# Patient Record
Sex: FEMALE | Race: WHITE | NOT HISPANIC OR LATINO | Employment: FULL TIME | URBAN - METROPOLITAN AREA
[De-identification: names, ages, dates, MRNs, and addresses within clinical notes are randomized per-mention and may not be internally consistent; named-entity substitution may affect disease eponyms.]

---

## 2017-01-27 ENCOUNTER — OFFICE VISIT (OUTPATIENT)
Dept: OBSTETRICS AND GYNECOLOGY | Facility: CLINIC | Age: 32
End: 2017-01-27
Payer: COMMERCIAL

## 2017-01-27 VITALS
DIASTOLIC BLOOD PRESSURE: 76 MMHG | BODY MASS INDEX: 41.52 KG/M2 | SYSTOLIC BLOOD PRESSURE: 128 MMHG | HEIGHT: 66 IN | WEIGHT: 258.38 LBS

## 2017-01-27 DIAGNOSIS — N91.2 AMENORRHEA: Primary | ICD-10-CM

## 2017-01-27 PROCEDURE — 99213 OFFICE O/P EST LOW 20 MIN: CPT | Mod: S$GLB,,, | Performed by: OBSTETRICS & GYNECOLOGY

## 2017-01-27 PROCEDURE — 99999 PR PBB SHADOW E&M-EST. PATIENT-LVL II: CPT | Mod: PBBFAC,,, | Performed by: OBSTETRICS & GYNECOLOGY

## 2017-01-27 RX ORDER — ONDANSETRON 4 MG/1
4 TABLET, FILM COATED ORAL DAILY PRN
Qty: 30 TABLET | Refills: 1 | Status: SHIPPED | OUTPATIENT
Start: 2017-01-27 | End: 2017-04-21

## 2017-01-27 NOTE — PROGRESS NOTES
"31 y.o.   OB History     No data available        Comlaining of:  Amenorrhea, lm-p nov 16, lorri aug 23  Prev c secton, had gest dm, elevated bp the last visit  No other co    PMH neg      ROS:  GENERAL: No fever, chills, fatigability or weight loss.  SKIN: No rashes, itching or changes in color or texture of skin.  HEAD: No headaches or recent head trauma.  EYES: Visual acuity fine. No photophobia, ocular pain or diplopia.  EARS: Denies ear pain, discharge or vertigo.  NOSE: No loss of smell, no epistaxis or postnasal drip.  MOUTH & THROAT: No hoarseness or change in voice. No excessive gum bleeding.  NODES: Denies swollen glands.  CHEST: Denies SWEET, cyanosis, wheezing, cough and sputum production.  CARDIOVASCULAR: Denies chest pain, PND, orthopnea or reduced exercise tolerance.  ABDOMEN: Appetite fine. No weight loss. Denies diarrhea, abdominal pain, hematemesis or blood in stool.  URINARY: No flank pain, dysuria or hematuria.  PERIPHERAL VASCULAR: No claudication or cyanosis.  MUSCULOSKELETAL: No joint stiffness or swelling. Denies back pain.  NEUROLOGIC: No history of seizures, paralysis, alteration of gait or coordination      PE:   Visit Vitals    /76    Ht 5' 6" (1.676 m)    Wt 117.2 kg (258 lb 6.1 oz)    LMP 11/16/2016 (Exact Date)    BMI 41.7 kg/m2        Hed/neck normal  abd soft  Viable on us    Discussed wt , sugar, etc  Labs next visit  Us 2 weeks, me in 3-4    "

## 2017-02-16 ENCOUNTER — OFFICE VISIT (OUTPATIENT)
Dept: OBSTETRICS AND GYNECOLOGY | Facility: CLINIC | Age: 32
End: 2017-02-16
Payer: COMMERCIAL

## 2017-02-16 DIAGNOSIS — N91.2 AMENORRHEA: Primary | ICD-10-CM

## 2017-02-16 DIAGNOSIS — N91.2 AMENORRHEA: ICD-10-CM

## 2017-02-16 DIAGNOSIS — Z36.89 ENCOUNTER TO ESTABLISH GESTATIONAL AGE USING ULTRASOUND: Primary | ICD-10-CM

## 2017-02-16 PROCEDURE — 76801 OB US < 14 WKS SINGLE FETUS: CPT | Mod: S$GLB,,, | Performed by: OBSTETRICS & GYNECOLOGY

## 2017-02-16 PROCEDURE — 0502F SUBSEQUENT PRENATAL CARE: CPT | Mod: S$GLB,,, | Performed by: OBSTETRICS & GYNECOLOGY

## 2017-03-30 ENCOUNTER — OFFICE VISIT (OUTPATIENT)
Dept: OBSTETRICS AND GYNECOLOGY | Facility: CLINIC | Age: 32
End: 2017-03-30
Payer: COMMERCIAL

## 2017-03-30 ENCOUNTER — LAB VISIT (OUTPATIENT)
Dept: LAB | Facility: HOSPITAL | Age: 32
End: 2017-03-30
Attending: OBSTETRICS & GYNECOLOGY
Payer: COMMERCIAL

## 2017-03-30 VITALS
WEIGHT: 259.69 LBS | DIASTOLIC BLOOD PRESSURE: 68 MMHG | SYSTOLIC BLOOD PRESSURE: 124 MMHG | BODY MASS INDEX: 41.92 KG/M2

## 2017-03-30 DIAGNOSIS — Z36.3 ENCOUNTER FOR ROUTINE SCREENING FOR MALFORMATION USING ULTRASONICS: ICD-10-CM

## 2017-03-30 DIAGNOSIS — N91.2 AMENORRHEA: ICD-10-CM

## 2017-03-30 DIAGNOSIS — N76.0 VAGINOSIS: Primary | ICD-10-CM

## 2017-03-30 LAB
ABO + RH BLD: NORMAL
ALBUMIN SERPL BCP-MCNC: 2.9 G/DL
ALP SERPL-CCNC: 66 U/L
ALT SERPL W/O P-5'-P-CCNC: 14 U/L
ANION GAP SERPL CALC-SCNC: 10 MMOL/L
AST SERPL-CCNC: 10 U/L
BASOPHILS # BLD AUTO: 0.01 K/UL
BASOPHILS NFR BLD: 0.1 %
BILIRUB SERPL-MCNC: 0.2 MG/DL
BLD GP AB SCN CELLS X3 SERPL QL: NORMAL
BUN SERPL-MCNC: 10 MG/DL
CALCIUM SERPL-MCNC: 9 MG/DL
CHLORIDE SERPL-SCNC: 108 MMOL/L
CO2 SERPL-SCNC: 17 MMOL/L
CREAT SERPL-MCNC: 0.8 MG/DL
DIFFERENTIAL METHOD: ABNORMAL
EOSINOPHIL # BLD AUTO: 0.1 K/UL
EOSINOPHIL NFR BLD: 0.9 %
ERYTHROCYTE [DISTWIDTH] IN BLOOD BY AUTOMATED COUNT: 13 %
EST. GFR  (AFRICAN AMERICAN): >60 ML/MIN/1.73 M^2
EST. GFR  (NON AFRICAN AMERICAN): >60 ML/MIN/1.73 M^2
GLUCOSE SERPL-MCNC: 186 MG/DL
HCT VFR BLD AUTO: 34.1 %
HGB BLD-MCNC: 11.7 G/DL
LYMPHOCYTES # BLD AUTO: 1.6 K/UL
LYMPHOCYTES NFR BLD: 17.5 %
MCH RBC QN AUTO: 30.4 PG
MCHC RBC AUTO-ENTMCNC: 34.3 %
MCV RBC AUTO: 89 FL
MONOCYTES # BLD AUTO: 0.3 K/UL
MONOCYTES NFR BLD: 3.2 %
NEUTROPHILS # BLD AUTO: 7.3 K/UL
NEUTROPHILS NFR BLD: 78.1 %
PLATELET # BLD AUTO: 373 K/UL
PMV BLD AUTO: 9.3 FL
POTASSIUM SERPL-SCNC: 3.4 MMOL/L
PROT SERPL-MCNC: 7.1 G/DL
RBC # BLD AUTO: 3.85 M/UL
SODIUM SERPL-SCNC: 135 MMOL/L
WBC # BLD AUTO: 9.36 K/UL

## 2017-03-30 PROCEDURE — 81220 CFTR GENE COM VARIANTS: CPT

## 2017-03-30 PROCEDURE — 87340 HEPATITIS B SURFACE AG IA: CPT

## 2017-03-30 PROCEDURE — 81511 FTL CGEN ABNOR FOUR ANAL: CPT

## 2017-03-30 PROCEDURE — 80053 COMPREHEN METABOLIC PANEL: CPT

## 2017-03-30 PROCEDURE — 85025 COMPLETE CBC W/AUTO DIFF WBC: CPT

## 2017-03-30 PROCEDURE — 86592 SYPHILIS TEST NON-TREP QUAL: CPT

## 2017-03-30 PROCEDURE — 86850 RBC ANTIBODY SCREEN: CPT

## 2017-03-30 PROCEDURE — 87480 CANDIDA DNA DIR PROBE: CPT

## 2017-03-30 PROCEDURE — 36415 COLL VENOUS BLD VENIPUNCTURE: CPT

## 2017-03-30 PROCEDURE — 86900 BLOOD TYPING SEROLOGIC ABO: CPT

## 2017-03-30 PROCEDURE — 86762 RUBELLA ANTIBODY: CPT

## 2017-03-30 PROCEDURE — 86703 HIV-1/HIV-2 1 RESULT ANTBDY: CPT

## 2017-03-30 PROCEDURE — 99999 PR PBB SHADOW E&M-EST. PATIENT-LVL II: CPT | Mod: PBBFAC,,, | Performed by: OBSTETRICS & GYNECOLOGY

## 2017-03-30 PROCEDURE — 0502F SUBSEQUENT PRENATAL CARE: CPT | Mod: S$GLB,,, | Performed by: OBSTETRICS & GYNECOLOGY

## 2017-03-31 LAB
HBV SURFACE AG SERPL QL IA: NEGATIVE
HIV 1+2 AB+HIV1 P24 AG SERPL QL IA: NEGATIVE
RPR SER QL: NORMAL
RUBV IGG SER-ACNC: 29.6 IU/ML
RUBV IGG SER-IMP: REACTIVE

## 2017-04-01 LAB
CANDIDA RRNA VAG QL PROBE: NEGATIVE
G VAGINALIS RRNA GENITAL QL PROBE: NEGATIVE
T VAGINALIS RRNA GENITAL QL PROBE: NEGATIVE

## 2017-04-03 LAB
ALPHA FETOPROTEIN MATERNAL: 26.5 NG/ML
CFTR MUT ANL BLD/T: NORMAL
DOWN RISK (<1:270): NORMAL
ETHNIC ORIGIN: NORMAL
GA METHOD: NORMAL
GESTATIONAL AGE (DAYS): 1
GESTATIONAL AGE (WEEKS): 15
HUMAN CHORIONIC GONADOTROPIN: 6.4 IU/ML
INHIBIN A: 115.5 PG/ML
INSULIN DEPEND. DIABETES: NORMAL
M.O.M. ALPHA FETOPROTEIN: 1.37
M.O.M. HCG: 0.18
M.O.M. INHIBIN A: 0.87
M.O.M. UNCONJ. ESTRIOL: 1.58
MATERNAL AGE AT EDD (YRS): 32
MATERNAL AGE FOR DOWN: NORMAL
MATERNAL WEIGHT (LBS): 260
MULTIPLE GESTATIONS: NORMAL
QUAD SCREEN INTERPRETATION: NORMAL
QUAD SCREEN: NEGATIVE
TRISOMY 18 (<1:100): NORMAL
UNCONJUGATED ESTRIOL: 0.85 NG/ML

## 2017-04-12 ENCOUNTER — PROCEDURE VISIT (OUTPATIENT)
Dept: OBSTETRICS AND GYNECOLOGY | Facility: CLINIC | Age: 32
End: 2017-04-12
Payer: COMMERCIAL

## 2017-04-12 DIAGNOSIS — Z36.3 ENCOUNTER FOR ROUTINE SCREENING FOR MALFORMATION USING ULTRASONICS: ICD-10-CM

## 2017-04-12 PROCEDURE — 76805 OB US >/= 14 WKS SNGL FETUS: CPT | Mod: S$GLB,,, | Performed by: OBSTETRICS & GYNECOLOGY

## 2017-04-13 ENCOUNTER — TELEPHONE (OUTPATIENT)
Dept: OBSTETRICS AND GYNECOLOGY | Facility: CLINIC | Age: 32
End: 2017-04-13

## 2017-04-13 NOTE — TELEPHONE ENCOUNTER
Informed patient of results. Patient voiced understanding. Notified patient to call office if there were any further questions.   Patient is reporting recently cutting her left leg while shaving and since then from her calf down has been swollen, red, and hot to the touch and her last BP was 138/68. Patient is negative for fever, blurry vision, or headaches. Verbalized understanding. Patient advised to seek treatment at an urgent care. Patient verbalized understanding.

## 2017-04-13 NOTE — TELEPHONE ENCOUNTER
----- Message from Michael A. Wiedemann, MD sent at 4/13/2017 12:15 PM CDT -----  Yay, her ob 20 week us yesterday was fine,  Make appt for ob fu in 4 weeks, thanks

## 2017-04-21 ENCOUNTER — ROUTINE PRENATAL (OUTPATIENT)
Dept: OBSTETRICS AND GYNECOLOGY | Facility: CLINIC | Age: 32
End: 2017-04-21
Payer: COMMERCIAL

## 2017-04-21 VITALS
BODY MASS INDEX: 43.02 KG/M2 | DIASTOLIC BLOOD PRESSURE: 70 MMHG | SYSTOLIC BLOOD PRESSURE: 116 MMHG | WEIGHT: 266.56 LBS

## 2017-04-21 DIAGNOSIS — N91.2 AMENORRHEA: Primary | ICD-10-CM

## 2017-04-21 PROCEDURE — 0502F SUBSEQUENT PRENATAL CARE: CPT | Mod: S$GLB,,, | Performed by: OBSTETRICS & GYNECOLOGY

## 2017-04-21 PROCEDURE — 99999 PR PBB SHADOW E&M-EST. PATIENT-LVL II: CPT | Mod: PBBFAC,,, | Performed by: OBSTETRICS & GYNECOLOGY

## 2017-05-03 ENCOUNTER — LAB VISIT (OUTPATIENT)
Dept: LAB | Facility: HOSPITAL | Age: 32
End: 2017-05-03
Attending: OBSTETRICS & GYNECOLOGY
Payer: COMMERCIAL

## 2017-05-03 DIAGNOSIS — N91.2 AMENORRHEA: ICD-10-CM

## 2017-05-03 LAB
GLUCOSE SERPL-MCNC: 108 MG/DL
GLUCOSE SERPL-MCNC: 185 MG/DL
GLUCOSE SERPL-MCNC: 266 MG/DL
GLUCOSE SERPL-MCNC: 77 MG/DL

## 2017-05-03 PROCEDURE — 82951 GLUCOSE TOLERANCE TEST (GTT): CPT

## 2017-05-03 PROCEDURE — 36415 COLL VENOUS BLD VENIPUNCTURE: CPT

## 2017-05-04 ENCOUNTER — TELEPHONE (OUTPATIENT)
Dept: OBSTETRICS AND GYNECOLOGY | Facility: CLINIC | Age: 32
End: 2017-05-04

## 2017-05-04 DIAGNOSIS — O24.419 GESTATIONAL DIABETES MELLITUS (GDM) IN SECOND TRIMESTER, GESTATIONAL DIABETES METHOD OF CONTROL UNSPECIFIED: Primary | ICD-10-CM

## 2017-05-04 NOTE — TELEPHONE ENCOUNTER
Yadira, she failed the 3 hr test, :(,  Needs to see diabetes nurse for 1800 brian diet and sugar testing, nivia,

## 2017-05-04 NOTE — TELEPHONE ENCOUNTER
Pt notified of results from 3 hr glucose and that the diabetic nurse will be contacting her. Pt verbalized understanding

## 2017-05-24 ENCOUNTER — HOSPITAL ENCOUNTER (OUTPATIENT)
Dept: DIABETES | Facility: HOSPITAL | Age: 32
Discharge: HOME | End: 2017-05-24
Attending: OBSTETRICS & GYNECOLOGY
Payer: COMMERCIAL

## 2017-05-24 VITALS — WEIGHT: 268 LBS | BODY MASS INDEX: 43.26 KG/M2

## 2017-05-24 DIAGNOSIS — O24.419 GESTATIONAL DIABETES MELLITUS (GDM) IN SECOND TRIMESTER, GESTATIONAL DIABETES METHOD OF CONTROL UNSPECIFIED: ICD-10-CM

## 2017-05-24 PROCEDURE — G0108 DIAB MANAGE TRN  PER INDIV: HCPCS | Performed by: REGISTERED NURSE

## 2017-05-24 NOTE — PROGRESS NOTES
Gestational Assessment    Corrine Hicks  Occupation: rn  Work Hours:  varies  Is there anyone who will help you with your diabetes care?  Yes   If yes, who?  fiance  Attending appointment with patient:    What is your expected delivery date?  17  Weeks Pregnant: 28  Natural or  Planned?  c section  Number of Pregnancies:  2  Number of Children:  1  Ages of Children:    How would you rate your understanding of gestational diabetes?  good  Past history of gestational diabetes?  yes  If yes, did you test your blood sugars?  Suppose to check but did not check  Do you exercise?  yes  If so, what do you do?  Walking, swimming  How many meals per day do you eat?  3  If you skip a meal, which meal do you normally skip?  breakfast  How many snacks per day do you eat?  1-2  What time of day do you normally snack?  whenever  Who does the food shopping?  self  Who prepares your meals?  fiance  How would you describe your portions?  Small  Pt came to clinic for GDM education. Pt had GDM with her previous pregnancy. Pt was given a Contour Next One meter and instructed in its use. Pt stated that she knows that she is suppose to test but probably will not be testing. Pt was instructed on the normal blood glucose ranges. Instructed pt to keep a record of her blood sugars and to bring the record to her md visits. Pt will follow up with md to get testing supplies ordered. Pt deferred instruction on the signs, symptoms and treatment of hypoglycemia. She stated that she is aware of this. Instructed pt on the 2000 brian ada meal plan with 3 meals and 3 snacks per day. Review of pts current meal plan showed tat she eats mainly protein and is avoiding carbs. Pt voiced that she has lost a good bit of weight recently and does not want to gain this weight back. Instructed pt on the food groups, how to read labels and count carbs. Pt was given sample menus and meal plans as examples. Discussed with pt the importance of eating  balanced and portioned meals and snacks. Discussed with pt foods that she likes that she could include in her meal plan. Pt had good understanding of meal plan and compliance is hoped for but doubted due to pts resistance of the foods she needs to eat. Pt was advised to call for any problems or questions.

## 2017-05-30 ENCOUNTER — TELEPHONE (OUTPATIENT)
Dept: OBSTETRICS AND GYNECOLOGY | Facility: CLINIC | Age: 32
End: 2017-05-30

## 2017-05-30 ENCOUNTER — ROUTINE PRENATAL (OUTPATIENT)
Dept: OBSTETRICS AND GYNECOLOGY | Facility: CLINIC | Age: 32
End: 2017-05-30
Payer: COMMERCIAL

## 2017-05-30 VITALS — WEIGHT: 272.5 LBS | DIASTOLIC BLOOD PRESSURE: 82 MMHG | SYSTOLIC BLOOD PRESSURE: 140 MMHG | BODY MASS INDEX: 43.98 KG/M2

## 2017-05-30 DIAGNOSIS — N91.2 AMENORRHEA: Primary | ICD-10-CM

## 2017-05-30 PROCEDURE — 99999 PR PBB SHADOW E&M-EST. PATIENT-LVL II: CPT | Mod: PBBFAC,,, | Performed by: OBSTETRICS & GYNECOLOGY

## 2017-05-30 PROCEDURE — 0502F SUBSEQUENT PRENATAL CARE: CPT | Mod: S$GLB,,, | Performed by: OBSTETRICS & GYNECOLOGY

## 2017-05-30 RX ORDER — AZITHROMYCIN 250 MG/1
TABLET, FILM COATED ORAL
Qty: 2 TABLET | Refills: 0 | Status: SHIPPED | OUTPATIENT
Start: 2017-05-30 | End: 2017-06-04

## 2017-05-30 NOTE — TELEPHONE ENCOUNTER
Pharmacy calling because z pack sig was dispense #2. Spoke to Maureen at [facility] and advised her to change the sig to #6.

## 2017-05-30 NOTE — TELEPHONE ENCOUNTER
----- Message from Cyndi Orozco sent at 5/30/2017  2:43 PM CDT -----  Contact: [facility] [phone]  Calling to verify patients medication. Please advice

## 2017-05-31 NOTE — TELEPHONE ENCOUNTER
Patient contacted and states she will talk to her insurance and contact the office with the correct meter. Verbalized understanding. Patient also states she does not think she needs the meter because she checks her sugar when she is at work.

## 2017-05-31 NOTE — TELEPHONE ENCOUNTER
Pt saw diabetic nurse for gest diabetes,but was given the 'wrong glucometer; for her insurace. Any way to call and find out what type she needs and can they give her that one?  Pt is an ED nurse.  thanks

## 2017-05-31 NOTE — PROGRESS NOTES
Pt saw Diabetic nures, but wrong glucometer.  Testing mostly fasting which is 100-105  Stressed good control

## 2017-06-06 ENCOUNTER — HOSPITAL ENCOUNTER (OUTPATIENT)
Facility: HOSPITAL | Age: 32
Discharge: HOME | End: 2017-06-07
Attending: OBSTETRICS & GYNECOLOGY | Admitting: OBSTETRICS & GYNECOLOGY
Payer: COMMERCIAL

## 2017-06-06 DIAGNOSIS — O13.9 HYPERTENSION OF PREGNANCY, TRANSIENT, ANTEPARTUM: ICD-10-CM

## 2017-06-06 LAB
ALBUMIN SERPL BCP-MCNC: 2.6 G/DL
ALP SERPL-CCNC: 99 U/L
ALT SERPL W/O P-5'-P-CCNC: 13 U/L
AMORPH CRY URNS QL MICRO: ABNORMAL
ANION GAP SERPL CALC-SCNC: 10 MMOL/L
AST SERPL-CCNC: 13 U/L
BACTERIA #/AREA URNS HPF: ABNORMAL /HPF
BASOPHILS # BLD AUTO: 0.01 K/UL
BASOPHILS NFR BLD: 0.1 %
BILIRUB SERPL-MCNC: 0.2 MG/DL
BILIRUB UR QL STRIP: NEGATIVE
BUN SERPL-MCNC: 10 MG/DL
CALCIUM SERPL-MCNC: 9.8 MG/DL
CHLORIDE SERPL-SCNC: 106 MMOL/L
CLARITY UR: ABNORMAL
CO2 SERPL-SCNC: 22 MMOL/L
COLOR UR: YELLOW
CREAT SERPL-MCNC: 0.7 MG/DL
CREAT UR-MCNC: 68.6 MG/DL
DIFFERENTIAL METHOD: ABNORMAL
EOSINOPHIL # BLD AUTO: 0.1 K/UL
EOSINOPHIL NFR BLD: 0.6 %
ERYTHROCYTE [DISTWIDTH] IN BLOOD BY AUTOMATED COUNT: 12.8 %
EST. GFR  (AFRICAN AMERICAN): >60 ML/MIN/1.73 M^2
EST. GFR  (NON AFRICAN AMERICAN): >60 ML/MIN/1.73 M^2
GLUCOSE SERPL-MCNC: 93 MG/DL
GLUCOSE UR QL STRIP: NEGATIVE
HCT VFR BLD AUTO: 31.7 %
HGB BLD-MCNC: 10.8 G/DL
HGB UR QL STRIP: NEGATIVE
KETONES UR QL STRIP: NEGATIVE
LDH SERPL L TO P-CCNC: 134 U/L
LEUKOCYTE ESTERASE UR QL STRIP: NEGATIVE
LYMPHOCYTES # BLD AUTO: 2 K/UL
LYMPHOCYTES NFR BLD: 23.7 %
MCH RBC QN AUTO: 29.3 PG
MCHC RBC AUTO-ENTMCNC: 34.1 %
MCV RBC AUTO: 86 FL
MICROSCOPIC COMMENT: ABNORMAL
MONOCYTES # BLD AUTO: 0.6 K/UL
MONOCYTES NFR BLD: 7.2 %
NEUTROPHILS # BLD AUTO: 5.7 K/UL
NEUTROPHILS NFR BLD: 68.2 %
NITRITE UR QL STRIP: NEGATIVE
PH UR STRIP: 7 [PH] (ref 5–8)
PLATELET # BLD AUTO: 366 K/UL
PMV BLD AUTO: 8.7 FL
POTASSIUM SERPL-SCNC: 3.2 MMOL/L
PROT SERPL-MCNC: 7.1 G/DL
PROT UR QL STRIP: NEGATIVE
PROT UR-MCNC: 8 MG/DL
PROT/CREAT RATIO, UR: 0.12
RBC # BLD AUTO: 3.68 M/UL
RBC #/AREA URNS HPF: 0 /HPF (ref 0–4)
SODIUM SERPL-SCNC: 138 MMOL/L
SP GR UR STRIP: 1.01 (ref 1–1.03)
SQUAMOUS #/AREA URNS HPF: ABNORMAL /HPF
URATE SERPL-MCNC: 2.7 MG/DL
URN SPEC COLLECT METH UR: ABNORMAL
UROBILINOGEN UR STRIP-ACNC: NEGATIVE EU/DL
WBC # BLD AUTO: 8.31 K/UL
WBC #/AREA URNS HPF: 2 /HPF (ref 0–5)

## 2017-06-06 PROCEDURE — 25000003 PHARM REV CODE 250: Performed by: OBSTETRICS & GYNECOLOGY

## 2017-06-06 PROCEDURE — 84156 ASSAY OF PROTEIN URINE: CPT

## 2017-06-06 PROCEDURE — 83615 LACTATE (LD) (LDH) ENZYME: CPT

## 2017-06-06 PROCEDURE — 84550 ASSAY OF BLOOD/URIC ACID: CPT

## 2017-06-06 PROCEDURE — G0378 HOSPITAL OBSERVATION PER HR: HCPCS

## 2017-06-06 PROCEDURE — 80053 COMPREHEN METABOLIC PANEL: CPT

## 2017-06-06 PROCEDURE — 99211 OFF/OP EST MAY X REQ PHY/QHP: CPT

## 2017-06-06 PROCEDURE — 36415 COLL VENOUS BLD VENIPUNCTURE: CPT

## 2017-06-06 PROCEDURE — 81000 URINALYSIS NONAUTO W/SCOPE: CPT

## 2017-06-06 PROCEDURE — 85025 COMPLETE CBC W/AUTO DIFF WBC: CPT

## 2017-06-06 PROCEDURE — 59025 FETAL NON-STRESS TEST: CPT | Mod: 26,,, | Performed by: OBSTETRICS & GYNECOLOGY

## 2017-06-06 PROCEDURE — 99220 PR INITIAL OBSERVATION CARE,LEVL III: CPT | Mod: 25,,, | Performed by: OBSTETRICS & GYNECOLOGY

## 2017-06-06 RX ORDER — ACETAMINOPHEN 325 MG/1
650 TABLET ORAL ONCE AS NEEDED
Status: ACTIVE | OUTPATIENT
Start: 2017-06-06 | End: 2017-06-06

## 2017-06-06 RX ORDER — BUTALBITAL, ACETAMINOPHEN AND CAFFEINE 50; 325; 40 MG/1; MG/1; MG/1
2 TABLET ORAL 2 TIMES DAILY PRN
Status: DISCONTINUED | OUTPATIENT
Start: 2017-06-06 | End: 2017-06-06

## 2017-06-06 RX ORDER — BUTALBITAL, ACETAMINOPHEN AND CAFFEINE 50; 325; 40 MG/1; MG/1; MG/1
2 TABLET ORAL 2 TIMES DAILY PRN
Status: DISCONTINUED | OUTPATIENT
Start: 2017-06-06 | End: 2017-06-07 | Stop reason: HOSPADM

## 2017-06-06 RX ORDER — BUTALBITAL, ACETAMINOPHEN AND CAFFEINE 50; 325; 40 MG/1; MG/1; MG/1
1 TABLET ORAL 2 TIMES DAILY PRN
Status: DISCONTINUED | OUTPATIENT
Start: 2017-06-06 | End: 2017-06-07 | Stop reason: HOSPADM

## 2017-06-06 RX ORDER — BUTALBITAL, ACETAMINOPHEN AND CAFFEINE 50; 325; 40 MG/1; MG/1; MG/1
1 TABLET ORAL 2 TIMES DAILY PRN
Status: DISCONTINUED | OUTPATIENT
Start: 2017-06-06 | End: 2017-06-06

## 2017-06-06 RX ORDER — ONDANSETRON 8 MG/1
8 TABLET, ORALLY DISINTEGRATING ORAL EVERY 8 HOURS PRN
Status: DISCONTINUED | OUTPATIENT
Start: 2017-06-06 | End: 2017-06-07 | Stop reason: HOSPADM

## 2017-06-06 RX ADMIN — BUTALBITAL, ACETAMINOPHEN, AND CAFFEINE 1 TABLET: 50; 325; 40 TABLET ORAL at 11:06

## 2017-06-07 ENCOUNTER — TELEPHONE (OUTPATIENT)
Dept: OBSTETRICS AND GYNECOLOGY | Facility: CLINIC | Age: 32
End: 2017-06-07

## 2017-06-07 VITALS
OXYGEN SATURATION: 99 % | SYSTOLIC BLOOD PRESSURE: 142 MMHG | WEIGHT: 272.5 LBS | HEART RATE: 79 BPM | BODY MASS INDEX: 43.98 KG/M2 | TEMPERATURE: 98 F | DIASTOLIC BLOOD PRESSURE: 78 MMHG | RESPIRATION RATE: 18 BRPM

## 2017-06-07 LAB
POCT GLUCOSE: 109 MG/DL (ref 70–110)
POCT GLUCOSE: 127 MG/DL (ref 70–110)
POCT GLUCOSE: 83 MG/DL (ref 70–110)
POCT GLUCOSE: 84 MG/DL (ref 70–110)
PROT 24H UR-MRATE: NORMAL MG/SPEC
PROT UR-MCNC: <7 MG/DL
URINE COLLECTION DURATION: 24 HR
URINE VOLUME: 3350 ML

## 2017-06-07 PROCEDURE — 82962 GLUCOSE BLOOD TEST: CPT

## 2017-06-07 PROCEDURE — 99225 PR SUBSEQUENT OBSERVATION CARE,LEVEL II: CPT | Mod: ,,, | Performed by: OBSTETRICS & GYNECOLOGY

## 2017-06-07 PROCEDURE — 25000003 PHARM REV CODE 250: Performed by: OBSTETRICS & GYNECOLOGY

## 2017-06-07 PROCEDURE — 59025 FETAL NON-STRESS TEST: CPT

## 2017-06-07 PROCEDURE — 87086 URINE CULTURE/COLONY COUNT: CPT

## 2017-06-07 PROCEDURE — 84156 ASSAY OF PROTEIN URINE: CPT

## 2017-06-07 RX ORDER — NITROFURANTOIN 25; 75 MG/1; MG/1
100 CAPSULE ORAL 2 TIMES DAILY
Qty: 14 CAPSULE | Refills: 0 | Status: SHIPPED | OUTPATIENT
Start: 2017-06-07 | End: 2017-06-14

## 2017-06-07 RX ORDER — NITROFURANTOIN 25; 75 MG/1; MG/1
100 CAPSULE ORAL EVERY 12 HOURS
Status: DISCONTINUED | OUTPATIENT
Start: 2017-06-07 | End: 2017-06-07 | Stop reason: HOSPADM

## 2017-06-07 RX ORDER — LABETALOL 200 MG/1
200 TABLET, FILM COATED ORAL ONCE
Qty: 30 TABLET | Refills: 1 | Status: SHIPPED | OUTPATIENT
Start: 2017-06-07 | End: 2017-07-17 | Stop reason: SDUPTHER

## 2017-06-07 RX ORDER — TERCONAZOLE 4 MG/G
1 CREAM VAGINAL NIGHTLY
Qty: 45 G | Refills: 0 | Status: SHIPPED | OUTPATIENT
Start: 2017-06-07 | End: 2017-06-14

## 2017-06-07 RX ORDER — LABETALOL 200 MG/1
200 TABLET, FILM COATED ORAL ONCE
Status: COMPLETED | OUTPATIENT
Start: 2017-06-07 | End: 2017-06-07

## 2017-06-07 RX ADMIN — NITROFURANTOIN (MONOHYDRATE/MACROCRYSTALS) 100 MG: 75; 25 CAPSULE ORAL at 09:06

## 2017-06-07 RX ADMIN — LABETALOL HYDROCHLORIDE 200 MG: 200 TABLET, FILM COATED ORAL at 12:06

## 2017-06-07 NOTE — TELEPHONE ENCOUNTER
----- Message from Cyndi Orozco sent at 6/7/2017 11:56 AM CDT -----  Contact: [facility] [phone]  Patient is calling to get refills on her freestyle lancets test stripes and the needles kit. Please advice

## 2017-06-07 NOTE — PLAN OF CARE
1800 Pt arrived from work with complaint of headache & elevated blood pressure. 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 Head to toe assessment completed, wnl. Plan of care reviewed, call bell within reach.    1830 Dr Hilton on unit. Orders received. Start 24 urine. Pt updated on status. Pt up to shower.    1900 Ultrasound at bedside.

## 2017-06-07 NOTE — PROGRESS NOTES
HPI:   Corrine Hicks is a 32 y.o. female  at 29 weeks EGA who presents here for elevated blood pressure which has been quite variable. Most blood pressures though meet criteria for treatment. Patient not doing blood sugars and admits she is rather non-compliant patient. We discussed she should start a baby aspirin daily.    History reviewed. No pertinent past medical history.  Past Surgical History:   Procedure Laterality Date     SECTION      TONSILLECTOMY       Family History   Problem Relation Age of Onset    Diabetes Mother     Diabetes Father     Diabetes Maternal Grandmother      Review of patient's allergies indicates no known allergies.       PE:   BP (!) 144/67   Pulse 73   Temp 98.1 °F (36.7 °C) (Oral)   Resp 18   Wt 123.6 kg (272 lb 7.8 oz)   LMP 2016 (Exact Date)   SpO2 99%   Breastfeeding? No   BMI 43.98 kg/m²   APPEARANCE: Well nourished, well developed, in no acute distress.  CHEST: Lungs clear to auscultation.  HEART: Regular rate and rhythm, no murmurs, rubs or gallops.  ABDOMEN:  Gravid.   EXTREMITIES: 1+ edema, NT    Assessment:  IUP 29 weeks 0 days  HTN  Gestational DM  Abnormal urinalyisis--most likely UTI will start Macrobid to go home with. Thinks she has a yeast infection so will also start Terazol cream  Category 1 Fetal Heart Tracing    Plan:   Finish 24 hour urine  And run it  Urine culture  Start Labetalol, Terazol and Macrobid.

## 2017-06-07 NOTE — PLAN OF CARE
1905 Pt received into care following shift report.  1935 head to toe assessment done,pt c/o headache rating at 5/10, states has recently taken tylenol and isnt working yet. Pt on EFM for assessment of FH and contractions.  1950 Dr MIRNA Conte notified re pts P/C ratio-orders received.  2020 Dr Moore to bedside, spoke with pt re plan of care, pt aggreeable to same.  2240 pt states headache is worse requesting medication  2244 Dr Moore notified-orders received.  0400 Pt states has noticed some itching in the vaginal area states she was on a Z pack last week.  0600 Dr M Wiedemann called in informed of pts BP and 24 hr urine ongoing.  0625 pt maintaining 24 hr urine collection.

## 2017-06-07 NOTE — DISCHARGE INSTRUCTIONS
Discharge home in stable condition. Follow up with Dr. Wiedemann at next scheduled appointment. Take medications as prescribed. Take 1/2 of Labetalol tablet each day for 100mg per day.

## 2017-06-07 NOTE — H&P
HISTORY AND PHYSICAL  ANTEPARTUM          Subjective:       Corrine Hicks is a 32 y.o.  female with IUP at 28w6d Pt arrived from work with complaint of headache & elevated blood pressure. 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   Patient denies contractions, denies vaginal bleeding, denies LOF.   Fetal Movement: normal.     PMHx: History reviewed. No pertinent past medical history.    PSHx:   Past Surgical History:   Procedure Laterality Date     SECTION      TONSILLECTOMY         All: Review of patient's allergies indicates:  No Known Allergies    Meds:   No prescriptions prior to admission.       SH:   Social History     Social History    Marital status: Single     Spouse name: N/A    Number of children: N/A    Years of education: N/A     Occupational History    Not on file.     Social History Main Topics    Smoking status: Former Smoker    Smokeless tobacco: Not on file    Alcohol use No    Drug use: No    Sexual activity: Not on file     Other Topics Concern    Not on file     Social History Narrative    No narrative on file       FH:   Family History   Problem Relation Age of Onset    Diabetes Mother     Diabetes Father     Diabetes Maternal Grandmother        OBHx:   Obstetric History       T1      L1     SAB0   TAB0   Ectopic0   Multiple0   Live Births0       # Outcome Date GA Lbr Meng/2nd Weight Sex Delivery Anes PTL Lv   2 Current            1 Term      Vag-Spont             Objective:       BP (!) 160/72   Pulse 81   Temp 98 °F (36.7 °C)   Resp 18   Wt 123.6 kg (272 lb 7.8 oz)   LMP 2016 (Exact Date)   SpO2 99%   Breastfeeding? No   BMI 43.98 kg/m²     Vitals:    17 1800 17 1803 17 1816   BP:  (!) 169/75 (!) 160/72   Pulse:  85 81   Resp:  18 18   Temp:  98 °F (36.7 °C)    SpO2:  99% 99%   Weight: 123.6 kg (272 lb 7.8 oz)         General:   alert, appears  stated age, cooperative and no distress   Lungs:   clear to auscultation bilaterally   Heart:   regular rate and rhythm   Abdomen:  soft, non-tender; bowel sounds normal; no masses,  no organomegaly   Extremities negative edema, negative erythema   FHT: 130's Cat 1 (reassuring)                 TOCO: none   Presentations: cephalic by U/s   Cervix:     Dilation:     Effacement:     Station:      Consistency:     Position:      Lab Review  Blood Type B POS    Rubella: Immune  RPR: nr  HIV: negative  HepB: negative    Results: Solitary intrauterine pregnancy noted in cephalic presentation.  Placenta is located posteriorly, grade 1. Fetal movement noted. The amniotic fluid index measures 23.7 cm, high normal.  Three-vessel cord noted. Limited anatomic survey is unremarkable.     BPD measures 7.97 cm   32 weeks zero days  head circumference 29.77 cm  33 weeks zero days  abdominal circumference 25.00 cm   29 weeks 2 days   femur length 5.43 cm   28 weeks 6 days   HC/AC ratio 1.19  (nl 0.99-1.21)     Composite estimated gestational age at 17 weeks one day plus or minus one week and one day.  Estimated fetal weight 1421g+/- 208 g or 3 lbs. 2 oz. +/- 7 ounces, 43 percentile per clinical dates.  Average fetal heart rate of 133 beats per minute.  The cervix is not well visualized.   Impression         Solitary viable intrauterine pregnancy with composite gestational age at 30 weeks 6 days +/- 2 weeks 2 days.    XIN measures 23.7 cm, high normal.      Electronically signed by: Lincoln De Jesus MD  Date: 06/06/17  Time: 19:29      Lab Results   Component Value Date    WBC 8.31 06/06/2017    HGB 10.8 (L) 06/06/2017    HCT 31.7 (L) 06/06/2017    MCV 86 06/06/2017     (H) 06/06/2017     CMP  Sodium   Date Value Ref Range Status   06/06/2017 138 136 - 145 mmol/L Final     Potassium   Date Value Ref Range Status   06/06/2017 3.2 (L) 3.5 - 5.1 mmol/L Final     Chloride   Date Value Ref Range Status   06/06/2017 106 95 - 110 mmol/L  Final     CO2   Date Value Ref Range Status   06/06/2017 22 (L) 23 - 29 mmol/L Final     Glucose   Date Value Ref Range Status   06/06/2017 93 70 - 110 mg/dL Final     BUN, Bld   Date Value Ref Range Status   06/06/2017 10 6 - 20 mg/dL Final     Creatinine   Date Value Ref Range Status   06/06/2017 0.7 0.5 - 1.4 mg/dL Final     Calcium   Date Value Ref Range Status   06/06/2017 9.8 8.7 - 10.5 mg/dL Final     Total Protein   Date Value Ref Range Status   06/06/2017 7.1 6.0 - 8.4 g/dL Final     Albumin   Date Value Ref Range Status   06/06/2017 2.6 (L) 3.5 - 5.2 g/dL Final     Total Bilirubin   Date Value Ref Range Status   06/06/2017 0.2 0.1 - 1.0 mg/dL Final     Comment:     For infants and newborns, interpretation of results should be based  on gestational age, weight and in agreement with clinical  observations.  Premature Infant recommended reference ranges:  Up to 24 hours.............<8.0 mg/dL  Up to 48 hours............<12.0 mg/dL  3-5 days..................<15.0 mg/dL  6-29 days.................<15.0 mg/dL       Alkaline Phosphatase   Date Value Ref Range Status   06/06/2017 99 55 - 135 U/L Final     AST   Date Value Ref Range Status   06/06/2017 13 10 - 40 U/L Final     ALT   Date Value Ref Range Status   06/06/2017 13 10 - 44 U/L Final     Anion Gap   Date Value Ref Range Status   06/06/2017 10 8 - 16 mmol/L Final     eGFR if    Date Value Ref Range Status   06/06/2017 >60 >60 mL/min/1.73 m^2 Final     eGFR if non    Date Value Ref Range Status   06/06/2017 >60 >60 mL/min/1.73 m^2 Final     Comment:     Calculation used to obtain the estimated glomerular filtration  rate (eGFR) is the CKD-EPI equation. Since race is unknown   in our information system, the eGFR values for   -American and Non--American patients are given   for each creatinine result.     P/C ratio is .12    Assessment:       28w6d weeks gestation presents for elevated BP's and HA    Patient  Active Problem List   Diagnosis    Obesity    Encounter for smoking cessation counseling    Elevated blood pressure reading    Cardiomegaly    Hypertension of pregnancy, transient, antepartum          Plan:      Risks, benefits, alternatives and possible complications have been discussed in detail with the patient.   - Consents signed and to chart  - Admit to  antefloor  R/o pre E  24 hr urine started  Will monitor BP's overnight

## 2017-06-07 NOTE — PLAN OF CARE
Dr. padron notified of low blood pressure following 200mg Labetalol dose. Will tell pt to take half a tablet for at home dose.    5021- 24 hour urine brought to lab. Dr. padron states if results are WNL, pt may be discharged home.

## 2017-06-08 ENCOUNTER — TELEPHONE (OUTPATIENT)
Dept: OBSTETRICS AND GYNECOLOGY | Facility: CLINIC | Age: 32
End: 2017-06-08

## 2017-06-08 NOTE — TELEPHONE ENCOUNTER
Called pt, feels fine, discussed neg protein, on labetalol, told to check bp 'here and there', but in resting state, update me few days

## 2017-06-09 LAB — BACTERIA UR CULT: NO GROWTH

## 2017-06-21 ENCOUNTER — TELEPHONE (OUTPATIENT)
Dept: OBSTETRICS AND GYNECOLOGY | Facility: CLINIC | Age: 32
End: 2017-06-21

## 2017-06-21 DIAGNOSIS — Z36.89 ULTRASOUND SCAN TO CHECK INTERVAL GROWTH OF FETUS: Primary | ICD-10-CM

## 2017-06-21 NOTE — TELEPHONE ENCOUNTER
Please advise when would you like this patient to come in and does she need an ultrasound as well?

## 2017-06-21 NOTE — TELEPHONE ENCOUNTER
----- Message from Cyndi Orozco sent at 6/21/2017  3:57 PM CDT -----  Contact: Self [phone]  Patient is calling to schedule her appointment and she also is requesting orders for her ultrasound. Please advice

## 2017-06-26 ENCOUNTER — ROUTINE PRENATAL (OUTPATIENT)
Dept: OBSTETRICS AND GYNECOLOGY | Facility: CLINIC | Age: 32
End: 2017-06-26
Payer: COMMERCIAL

## 2017-06-26 ENCOUNTER — OFFICE VISIT (OUTPATIENT)
Dept: OBSTETRICS AND GYNECOLOGY | Facility: CLINIC | Age: 32
End: 2017-06-26
Payer: COMMERCIAL

## 2017-06-26 VITALS
SYSTOLIC BLOOD PRESSURE: 130 MMHG | WEIGHT: 276.25 LBS | BODY MASS INDEX: 44.59 KG/M2 | DIASTOLIC BLOOD PRESSURE: 90 MMHG

## 2017-06-26 DIAGNOSIS — Z36.89 ULTRASOUND SCAN TO CHECK INTERVAL GROWTH OF FETUS: ICD-10-CM

## 2017-06-26 DIAGNOSIS — N91.2 AMENORRHEA: Primary | ICD-10-CM

## 2017-06-26 PROCEDURE — 76816 OB US FOLLOW-UP PER FETUS: CPT | Mod: S$GLB,,, | Performed by: OBSTETRICS & GYNECOLOGY

## 2017-06-26 PROCEDURE — 0502F SUBSEQUENT PRENATAL CARE: CPT | Mod: S$GLB,,, | Performed by: OBSTETRICS & GYNECOLOGY

## 2017-06-26 PROCEDURE — 99999 PR PBB SHADOW E&M-EST. PATIENT-LVL II: CPT | Mod: PBBFAC,,, | Performed by: OBSTETRICS & GYNECOLOGY

## 2017-06-26 RX ORDER — ONDANSETRON 4 MG/1
4 TABLET, FILM COATED ORAL DAILY PRN
Qty: 30 TABLET | Refills: 1 | Status: ON HOLD | OUTPATIENT
Start: 2017-06-26 | End: 2017-07-29 | Stop reason: HOSPADM

## 2017-06-26 NOTE — PROGRESS NOTES
Pt on labetalolo but not taking bid, encouraged to take at least 200qhs, then 100 in am, states her glucose   are 'all good', but quoc hunter list, baby actvie, see us today, good grwoth and health  Plan, nst next week, see me the following

## 2017-06-28 ENCOUNTER — TELEPHONE (OUTPATIENT)
Dept: OBSTETRICS AND GYNECOLOGY | Facility: CLINIC | Age: 32
End: 2017-06-28

## 2017-06-28 DIAGNOSIS — O16.3 ELEVATED BLOOD PRESSURE AFFECTING PREGNANCY IN THIRD TRIMESTER, ANTEPARTUM: ICD-10-CM

## 2017-06-28 DIAGNOSIS — O24.419 GESTATIONAL DIABETES MELLITUS (GDM) IN THIRD TRIMESTER, GESTATIONAL DIABETES METHOD OF CONTROL UNSPECIFIED: Primary | ICD-10-CM

## 2017-06-28 NOTE — TELEPHONE ENCOUNTER
Returned patients call.   Patient scheduled for NST on 7-6-17 at 1000 and follow up with Dr. Wiedemann on 07-13-17 at 1515. Verbalized understanding.

## 2017-06-28 NOTE — TELEPHONE ENCOUNTER
----- Message from Latricia Heart sent at 6/28/2017  8:22 AM CDT -----  Contact: Self/ [phone] or [phone]  Patient would like to speak with you about getting scheduled for a stress test and another appointment. Patient said she can be seen on 7/5/17, but my schedule shows next available 7/17/17. Please advise.

## 2017-06-29 ENCOUNTER — TELEPHONE (OUTPATIENT)
Dept: OBSTETRICS AND GYNECOLOGY | Facility: CLINIC | Age: 32
End: 2017-06-29

## 2017-06-29 NOTE — TELEPHONE ENCOUNTER
----- Message from Sharri Felipe sent at 6/29/2017 10:51 AM CDT -----  Contact: self, [phone]  Patient called in returning your call. Please advise.

## 2017-06-29 NOTE — TELEPHONE ENCOUNTER
----- Message from Corrine Garza sent at 6/29/2017 10:52 AM CDT -----  Contact: [phone] / self   Patient called in requesting to speak with you. Patient prefers to speak with a nurse. Please advise.

## 2017-06-29 NOTE — LETTER
June 29, 2017    Corrine Hicks  [address] [place] [id number]         [facility]  [address] [facility], [address]  5th Floor [facility]  [place] [address]  Phone: [phone] June 29, 2017     Patient: Corrine Hicks   YOB: 1985   Date of Visit: 6/29/2017       To Whom It May Concern:    It is my medical opinion that Corrine Hicks due to pregnancy related complications the patient should stop working on 7-10-17 .    If you have any questions or concerns, please don't hesitate to call.    Sincerely,          Michael A. Wiedemann, M.D.

## 2017-07-06 ENCOUNTER — HOSPITAL ENCOUNTER (OUTPATIENT)
Dept: OBSTETRICS AND GYNECOLOGY | Facility: HOSPITAL | Age: 32
Discharge: HOME | End: 2017-07-06
Attending: OBSTETRICS & GYNECOLOGY
Payer: COMMERCIAL

## 2017-07-06 DIAGNOSIS — O24.419 GESTATIONAL DIABETES MELLITUS (GDM) AFFECTING SECOND PREGNANCY: Primary | ICD-10-CM

## 2017-07-06 DIAGNOSIS — O13.3: ICD-10-CM

## 2017-07-06 PROCEDURE — 59025 FETAL NON-STRESS TEST: CPT

## 2017-07-06 PROCEDURE — 59025 FETAL NON-STRESS TEST: CPT | Mod: 26,,, | Performed by: OBSTETRICS & GYNECOLOGY

## 2017-07-10 ENCOUNTER — TELEPHONE (OUTPATIENT)
Dept: OBSTETRICS AND GYNECOLOGY | Facility: CLINIC | Age: 32
End: 2017-07-10

## 2017-07-10 NOTE — TELEPHONE ENCOUNTER
----- Message from Latricia Heart sent at 7/7/2017  2:46 PM CDT -----  Contact: Self/ [phone]  Patient would like to speak with you about a personal matter. Please advise

## 2017-07-17 ENCOUNTER — ROUTINE PRENATAL (OUTPATIENT)
Dept: OBSTETRICS AND GYNECOLOGY | Facility: CLINIC | Age: 32
End: 2017-07-17
Payer: COMMERCIAL

## 2017-07-17 ENCOUNTER — TELEPHONE (OUTPATIENT)
Dept: OBSTETRICS AND GYNECOLOGY | Facility: CLINIC | Age: 32
End: 2017-07-17

## 2017-07-17 VITALS — BODY MASS INDEX: 44.87 KG/M2 | DIASTOLIC BLOOD PRESSURE: 84 MMHG | WEIGHT: 278 LBS | SYSTOLIC BLOOD PRESSURE: 126 MMHG

## 2017-07-17 DIAGNOSIS — Z36.89 ENCOUNTER FOR ULTRASOUND TO CHECK FETAL GROWTH: Primary | ICD-10-CM

## 2017-07-17 PROCEDURE — 0502F SUBSEQUENT PRENATAL CARE: CPT | Mod: S$GLB,,, | Performed by: OBSTETRICS & GYNECOLOGY

## 2017-07-17 PROCEDURE — 99999 PR PBB SHADOW E&M-EST. PATIENT-LVL II: CPT | Mod: PBBFAC,,, | Performed by: OBSTETRICS & GYNECOLOGY

## 2017-07-17 RX ORDER — LABETALOL 200 MG/1
200 TABLET, FILM COATED ORAL ONCE
Qty: 30 TABLET | Refills: 1 | Status: SHIPPED | OUTPATIENT
Start: 2017-07-17 | End: 2017-07-24

## 2017-07-17 NOTE — TELEPHONE ENCOUNTER
----- Message from Michael A. Wiedemann, MD sent at 7/17/2017  6:55 AM CDT -----  Remind her to bring her glucose levels to visit for me to check

## 2017-07-17 NOTE — PROGRESS NOTES
Pt stopped work and bp better, states her glucose is good, states fasting , 2hr less 130, did not bring sheet as told, baby actvie, fht good, uterus soft,   Discussed dates for secton, I will be out until aug 7, that would be 38 weeks, with pt on labetalol, albina,

## 2017-07-18 ENCOUNTER — TELEPHONE (OUTPATIENT)
Dept: OBSTETRICS AND GYNECOLOGY | Facility: CLINIC | Age: 32
End: 2017-07-18

## 2017-07-18 NOTE — TELEPHONE ENCOUNTER
----- Message from Michael A. Wiedemann, MD sent at 7/18/2017  7:35 AM CDT -----  Please set her up for nst thurs ,  Thanks,  You have good nst setting up skills, etta

## 2017-07-20 ENCOUNTER — HOSPITAL ENCOUNTER (OUTPATIENT)
Facility: HOSPITAL | Age: 32
Discharge: HOME | End: 2017-07-20
Attending: OBSTETRICS & GYNECOLOGY | Admitting: OBSTETRICS & GYNECOLOGY
Payer: COMMERCIAL

## 2017-07-20 VITALS
BODY MASS INDEX: 44.52 KG/M2 | SYSTOLIC BLOOD PRESSURE: 147 MMHG | WEIGHT: 277 LBS | DIASTOLIC BLOOD PRESSURE: 89 MMHG | HEART RATE: 82 BPM | HEIGHT: 66 IN

## 2017-07-20 DIAGNOSIS — O10.919 CHRONIC HYPERTENSION AFFECTING PREGNANCY: ICD-10-CM

## 2017-07-20 LAB
ALBUMIN SERPL BCP-MCNC: 2.6 G/DL
ALP SERPL-CCNC: 155 U/L
ALT SERPL W/O P-5'-P-CCNC: 12 U/L
ANION GAP SERPL CALC-SCNC: 10 MMOL/L
AST SERPL-CCNC: 14 U/L
BASOPHILS # BLD AUTO: 0.01 K/UL
BASOPHILS NFR BLD: 0.1 %
BILIRUB SERPL-MCNC: 0.3 MG/DL
BUN SERPL-MCNC: 5 MG/DL
CALCIUM SERPL-MCNC: 10 MG/DL
CHLORIDE SERPL-SCNC: 105 MMOL/L
CO2 SERPL-SCNC: 24 MMOL/L
CREAT SERPL-MCNC: 0.7 MG/DL
CREAT UR-MCNC: 130.2 MG/DL
DIFFERENTIAL METHOD: ABNORMAL
EOSINOPHIL # BLD AUTO: 0.1 K/UL
EOSINOPHIL NFR BLD: 0.6 %
ERYTHROCYTE [DISTWIDTH] IN BLOOD BY AUTOMATED COUNT: 13.1 %
EST. GFR  (AFRICAN AMERICAN): >60 ML/MIN/1.73 M^2
EST. GFR  (NON AFRICAN AMERICAN): >60 ML/MIN/1.73 M^2
GLUCOSE SERPL-MCNC: 93 MG/DL
HCT VFR BLD AUTO: 34.4 %
HGB BLD-MCNC: 11.6 G/DL
LDH SERPL L TO P-CCNC: 133 U/L
LYMPHOCYTES # BLD AUTO: 2 K/UL
LYMPHOCYTES NFR BLD: 24.5 %
MCH RBC QN AUTO: 29.1 PG
MCHC RBC AUTO-ENTMCNC: 33.7 G/DL
MCV RBC AUTO: 86 FL
MONOCYTES # BLD AUTO: 0.5 K/UL
MONOCYTES NFR BLD: 5.4 %
NEUTROPHILS # BLD AUTO: 5.8 K/UL
NEUTROPHILS NFR BLD: 69.3 %
PLATELET # BLD AUTO: 313 K/UL
PMV BLD AUTO: 9 FL
POTASSIUM SERPL-SCNC: 2.9 MMOL/L
PROT SERPL-MCNC: 7.3 G/DL
PROT UR-MCNC: 23 MG/DL
PROT/CREAT RATIO, UR: 0.18
RBC # BLD AUTO: 3.99 M/UL
SODIUM SERPL-SCNC: 139 MMOL/L
WBC # BLD AUTO: 8.31 K/UL

## 2017-07-20 PROCEDURE — 85025 COMPLETE CBC W/AUTO DIFF WBC: CPT

## 2017-07-20 PROCEDURE — 80053 COMPREHEN METABOLIC PANEL: CPT

## 2017-07-20 PROCEDURE — 59025 FETAL NON-STRESS TEST: CPT | Mod: 26,,, | Performed by: OBSTETRICS & GYNECOLOGY

## 2017-07-20 PROCEDURE — 25000003 PHARM REV CODE 250: Performed by: OBSTETRICS & GYNECOLOGY

## 2017-07-20 PROCEDURE — 99211 OFF/OP EST MAY X REQ PHY/QHP: CPT | Mod: 25

## 2017-07-20 PROCEDURE — 83615 LACTATE (LD) (LDH) ENZYME: CPT

## 2017-07-20 PROCEDURE — 36415 COLL VENOUS BLD VENIPUNCTURE: CPT

## 2017-07-20 PROCEDURE — 59025 FETAL NON-STRESS TEST: CPT

## 2017-07-20 PROCEDURE — 99218 PR INITIAL OBSERVATION CARE,LEVL I: CPT | Mod: 25,,, | Performed by: OBSTETRICS & GYNECOLOGY

## 2017-07-20 PROCEDURE — 82570 ASSAY OF URINE CREATININE: CPT

## 2017-07-20 RX ORDER — LABETALOL 200 MG/1
200 TABLET, FILM COATED ORAL EVERY 12 HOURS
Status: DISCONTINUED | OUTPATIENT
Start: 2017-07-20 | End: 2017-07-20 | Stop reason: HOSPADM

## 2017-07-20 RX ADMIN — LABETALOL HYDROCHLORIDE 200 MG: 200 TABLET, FILM COATED ORAL at 06:07

## 2017-07-20 NOTE — PROGRESS NOTES
Pt known to me, 35-1, rose dm, chtn , pt only taking her labetalol at night,  Sent for routine nst today, has cold symptoms, no headaches, bp found to be sl elevated, has been before,   No pain, abd soft, nst reactive with accelerations per protocol  Plan, give labetalol now, urine/labs, cont obs

## 2017-07-21 NOTE — PLAN OF CARE
1900- report received. Wiedemann on unit. Orders to get a few BP's and he will re evaluate the pt in a little while.   2008- spoke to dr wiedemann he looked at vital signs with Rn as well as Labs. He stated pt is ok to go home but to be on limited activity (no shopping, excessive walking). She is to take her labetalol 200mg BID.   2032- discharge papers given to pt pt stated she understood discharge teaching. Pt also to follow up with Dr Wiedemann on Monday as scheduled.

## 2017-07-21 NOTE — NURSING
1615 33 y/o  35.1 wk preg pt of Dr. Wiedemann arrived from home for scheduled NST. Reviewed Ob and M/s hx  Pt has hx of GDM and GHTN; BP elevated 173/80 upon admit; will repeat;  EFM and toco in place; see pericalm for details    Pt denies Headache, blurry vision, epigastric pain; no edema noted, reflexes normal    Dr. Wiedemann on unit ; notified of elevated BP; will cont to monitor; plan of care discussed for pre e labs and BP series;

## 2017-07-24 ENCOUNTER — OFFICE VISIT (OUTPATIENT)
Dept: OBSTETRICS AND GYNECOLOGY | Facility: CLINIC | Age: 32
End: 2017-07-24
Payer: COMMERCIAL

## 2017-07-24 ENCOUNTER — HOSPITAL ENCOUNTER (OUTPATIENT)
Facility: HOSPITAL | Age: 32
Discharge: HOME | End: 2017-07-24
Attending: OBSTETRICS & GYNECOLOGY | Admitting: OBSTETRICS & GYNECOLOGY
Payer: COMMERCIAL

## 2017-07-24 ENCOUNTER — LAB VISIT (OUTPATIENT)
Dept: LAB | Facility: HOSPITAL | Age: 32
End: 2017-07-24
Attending: OBSTETRICS & GYNECOLOGY
Payer: COMMERCIAL

## 2017-07-24 ENCOUNTER — ROUTINE PRENATAL (OUTPATIENT)
Dept: OBSTETRICS AND GYNECOLOGY | Facility: CLINIC | Age: 32
End: 2017-07-24
Payer: COMMERCIAL

## 2017-07-24 ENCOUNTER — TELEPHONE (OUTPATIENT)
Dept: OBSTETRICS AND GYNECOLOGY | Facility: CLINIC | Age: 32
End: 2017-07-24

## 2017-07-24 VITALS
DIASTOLIC BLOOD PRESSURE: 65 MMHG | HEART RATE: 88 BPM | RESPIRATION RATE: 18 BRPM | SYSTOLIC BLOOD PRESSURE: 134 MMHG | TEMPERATURE: 98 F | OXYGEN SATURATION: 99 %

## 2017-07-24 VITALS
BODY MASS INDEX: 45.62 KG/M2 | WEIGHT: 282.63 LBS | SYSTOLIC BLOOD PRESSURE: 164 MMHG | DIASTOLIC BLOOD PRESSURE: 98 MMHG

## 2017-07-24 DIAGNOSIS — O99.213 OBESITY AFFECTING PREGNANCY IN THIRD TRIMESTER, ANTEPARTUM: Primary | ICD-10-CM

## 2017-07-24 DIAGNOSIS — O10.919 HTN IN PREGNANCY, CHRONIC: Primary | ICD-10-CM

## 2017-07-24 DIAGNOSIS — Z36.85 SCREENING, ANTENATAL, FOR STREPTOCOCCUS B: Primary | ICD-10-CM

## 2017-07-24 DIAGNOSIS — Z36.89 ENCOUNTER FOR ULTRASOUND TO CHECK FETAL GROWTH: ICD-10-CM

## 2017-07-24 DIAGNOSIS — O12.13: ICD-10-CM

## 2017-07-24 DIAGNOSIS — O10.919 CHRONIC HYPERTENSION AFFECTING PREGNANCY: ICD-10-CM

## 2017-07-24 DIAGNOSIS — O16.9 HYPERTENSION AFFECTING PREGNANCY: ICD-10-CM

## 2017-07-24 LAB
CREAT UR-MCNC: 123.5 MG/DL
PROT UR-MCNC: 23 MG/DL
PROT/CREAT RATIO, UR: 0.19

## 2017-07-24 PROCEDURE — 87184 SC STD DISK METHOD PER PLATE: CPT

## 2017-07-24 PROCEDURE — 59025 FETAL NON-STRESS TEST: CPT | Mod: S$GLB,,, | Performed by: OBSTETRICS & GYNECOLOGY

## 2017-07-24 PROCEDURE — 87147 CULTURE TYPE IMMUNOLOGIC: CPT

## 2017-07-24 PROCEDURE — 76819 FETAL BIOPHYS PROFIL W/O NST: CPT | Mod: S$GLB,,, | Performed by: OBSTETRICS & GYNECOLOGY

## 2017-07-24 PROCEDURE — 87081 CULTURE SCREEN ONLY: CPT

## 2017-07-24 PROCEDURE — 82570 ASSAY OF URINE CREATININE: CPT

## 2017-07-24 PROCEDURE — 59025 FETAL NON-STRESS TEST: CPT

## 2017-07-24 PROCEDURE — 0502F SUBSEQUENT PRENATAL CARE: CPT | Mod: S$GLB,,, | Performed by: OBSTETRICS & GYNECOLOGY

## 2017-07-24 PROCEDURE — 99211 OFF/OP EST MAY X REQ PHY/QHP: CPT | Mod: 25

## 2017-07-24 PROCEDURE — 76816 OB US FOLLOW-UP PER FETUS: CPT | Mod: S$GLB,,, | Performed by: OBSTETRICS & GYNECOLOGY

## 2017-07-24 PROCEDURE — 99999 PR PBB SHADOW E&M-EST. PATIENT-LVL III: CPT | Mod: PBBFAC,,, | Performed by: OBSTETRICS & GYNECOLOGY

## 2017-07-24 NOTE — DISCHARGE INSTRUCTIONS
Get rest & drink plenty of water. Continue taking blood pressure medicine as directed. Return for any headache or blurred vision. Also return for any vaginal bleeding, severe pain, decreased fetal movement or rupture of membranes. Dr Wiedemann will follow up with you by phone tomorrow.

## 2017-07-24 NOTE — LETTER
July 24, 2017    Corrine Hicks  [address] [place] [id number]              [facility]  [address] [facility], [address]  5th Floor [facility]  [place] [address]  Phone: [phone]    To Whom It May Concern:    Mrs. Hicks is currently under our care for pregnancy.  Estimated Date of Delivery: 08-  It is my medical opinion at this time that the patient should not work due to complications of pregnancy, beginning 06-28-17.   If you have any further questions, please do not hesitate to call my office.         Sincerely,        Michael A. Wiedemann, M.D.

## 2017-07-24 NOTE — PROGRESS NOTES
Was on LnD last week for nst, bp was up, but pt was only on labetalol at night,   So bp responded, no ha, baby actvie, bp 164/98  uteru sof,t cx closed, strep  To lnd for nst  Us today efw 7-6  Grade 2-3 placenta

## 2017-07-24 NOTE — PROGRESS NOTES
Pt sent to LnD for nst, bp all good, 147/68,  149.71, 134/65 etc,  Very reactive nst  Pt has this where bp is sl elevated, then normalizes for acceptable range,  Ultrasound today shows 8/8 bpp, efw 7-6  Pt did not bring any glucose levels  States she is taking her labetalol , but took it today after her visit  Plan, will have pt see [facility] Wednesday for evaluatiom/

## 2017-07-24 NOTE — TELEPHONE ENCOUNTER
Hey, tell her the urine today was fine, we made appt with maternal fetal but couldn't get in [place], i'd like her to go there for an opinion,  It's mostly for her bp situation, but bring some glucose levels too.   Take her meds also bid!  :)  thanks

## 2017-07-24 NOTE — PLAN OF CARE
1015 Pt arrived from Dr Wiedemann's office for eval of elevated blood pressure in office. Pt dressed in gown & EFM applied with +fetal heart tones & audible movement. Head to toe assessment completed, wnl. Pt denies headache or blurred vision. Minimal swelling to bilat ankles. Pt reports good fetal movement. Call bell within reach.    1035 Dr Wiedemann phoned unit for blood pressures. Reported 139/63 &  145/67. Will continue to monitor.    1135 BP's given to Dr Wiedemann.... 147/68, 149/71, 153/73, 134/65. Discharge orders rec'd.    1150 Pt given discharge & follow up instructions.

## 2017-07-26 ENCOUNTER — ANESTHESIA EVENT (OUTPATIENT)
Dept: OBSTETRICS AND GYNECOLOGY | Facility: HOSPITAL | Age: 32
End: 2017-07-26
Payer: COMMERCIAL

## 2017-07-26 ENCOUNTER — SURGERY (OUTPATIENT)
Age: 32
End: 2017-07-26

## 2017-07-26 ENCOUNTER — HOSPITAL ENCOUNTER (INPATIENT)
Facility: HOSPITAL | Age: 32
LOS: 3 days | Discharge: HOME | End: 2017-07-29
Attending: OBSTETRICS & GYNECOLOGY | Admitting: OBSTETRICS & GYNECOLOGY
Payer: COMMERCIAL

## 2017-07-26 ENCOUNTER — ANESTHESIA (OUTPATIENT)
Dept: OBSTETRICS AND GYNECOLOGY | Facility: HOSPITAL | Age: 32
End: 2017-07-26
Payer: COMMERCIAL

## 2017-07-26 ENCOUNTER — OFFICE VISIT (OUTPATIENT)
Dept: MATERNAL FETAL MEDICINE | Facility: CLINIC | Age: 32
End: 2017-07-26
Payer: COMMERCIAL

## 2017-07-26 VITALS
WEIGHT: 280.44 LBS | BODY MASS INDEX: 45.26 KG/M2 | DIASTOLIC BLOOD PRESSURE: 99 MMHG | SYSTOLIC BLOOD PRESSURE: 155 MMHG

## 2017-07-26 DIAGNOSIS — O10.919 HTN IN PREGNANCY, CHRONIC: ICD-10-CM

## 2017-07-26 DIAGNOSIS — Z36.89 ENCOUNTER FOR FETAL ANATOMIC SURVEY: ICD-10-CM

## 2017-07-26 DIAGNOSIS — O99.213 OBESITY AFFECTING PREGNANCY IN THIRD TRIMESTER: ICD-10-CM

## 2017-07-26 LAB
ABO + RH BLD: NORMAL
ALBUMIN SERPL BCP-MCNC: 2.6 G/DL
ALP SERPL-CCNC: 147 U/L
ALT SERPL W/O P-5'-P-CCNC: 16 U/L
ANION GAP SERPL CALC-SCNC: 8 MMOL/L
AST SERPL-CCNC: 16 U/L
BASOPHILS # BLD AUTO: 0 K/UL
BASOPHILS NFR BLD: 0 %
BILIRUB SERPL-MCNC: 0.2 MG/DL
BLD GP AB SCN CELLS X3 SERPL QL: NORMAL
BUN SERPL-MCNC: 5 MG/DL
CALCIUM SERPL-MCNC: 9 MG/DL
CHLORIDE SERPL-SCNC: 108 MMOL/L
CO2 SERPL-SCNC: 22 MMOL/L
CREAT SERPL-MCNC: 0.7 MG/DL
DIFFERENTIAL METHOD: ABNORMAL
EOSINOPHIL # BLD AUTO: 0 K/UL
EOSINOPHIL NFR BLD: 0.5 %
ERYTHROCYTE [DISTWIDTH] IN BLOOD BY AUTOMATED COUNT: 13.4 %
EST. GFR  (AFRICAN AMERICAN): >60 ML/MIN/1.73 M^2
EST. GFR  (NON AFRICAN AMERICAN): >60 ML/MIN/1.73 M^2
GLUCOSE SERPL-MCNC: 83 MG/DL
HCT VFR BLD AUTO: 32.2 %
HGB BLD-MCNC: 10.8 G/DL
LYMPHOCYTES # BLD AUTO: 1.6 K/UL
LYMPHOCYTES NFR BLD: 19.8 %
MCH RBC QN AUTO: 28.6 PG
MCHC RBC AUTO-ENTMCNC: 33.5 G/DL
MCV RBC AUTO: 85 FL
MONOCYTES # BLD AUTO: 0.5 K/UL
MONOCYTES NFR BLD: 5.9 %
NEUTROPHILS # BLD AUTO: 5.9 K/UL
NEUTROPHILS NFR BLD: 73.4 %
PLATELET # BLD AUTO: 351 K/UL
PMV BLD AUTO: 9.2 FL
POTASSIUM SERPL-SCNC: 3.3 MMOL/L
PROT SERPL-MCNC: 7.2 G/DL
RBC # BLD AUTO: 3.78 M/UL
SODIUM SERPL-SCNC: 138 MMOL/L
WBC # BLD AUTO: 8.08 K/UL

## 2017-07-26 PROCEDURE — 36000684 HC CESAREAN SECTION, UNSCHEDULED

## 2017-07-26 PROCEDURE — 11000001 HC ACUTE MED/SURG PRIVATE ROOM

## 2017-07-26 PROCEDURE — 59510 CESAREAN DELIVERY: CPT | Mod: AT,,, | Performed by: OBSTETRICS & GYNECOLOGY

## 2017-07-26 PROCEDURE — 25000003 PHARM REV CODE 250: Performed by: OBSTETRICS & GYNECOLOGY

## 2017-07-26 PROCEDURE — 99999 PR PBB SHADOW E&M-EST. PATIENT-LVL II: CPT | Mod: PBBFAC,,, | Performed by: OBSTETRICS & GYNECOLOGY

## 2017-07-26 PROCEDURE — 86900 BLOOD TYPING SEROLOGIC ABO: CPT

## 2017-07-26 PROCEDURE — 86592 SYPHILIS TEST NON-TREP QUAL: CPT

## 2017-07-26 PROCEDURE — 37000009 HC ANESTHESIA EA ADD 15 MINS: Performed by: OBSTETRICS & GYNECOLOGY

## 2017-07-26 PROCEDURE — 85025 COMPLETE CBC W/AUTO DIFF WBC: CPT

## 2017-07-26 PROCEDURE — 25000003 PHARM REV CODE 250: Performed by: ANESTHESIOLOGY

## 2017-07-26 PROCEDURE — 63600175 PHARM REV CODE 636 W HCPCS: Performed by: OBSTETRICS & GYNECOLOGY

## 2017-07-26 PROCEDURE — 76811 OB US DETAILED SNGL FETUS: CPT | Mod: S$GLB,,, | Performed by: OBSTETRICS & GYNECOLOGY

## 2017-07-26 PROCEDURE — 86901 BLOOD TYPING SEROLOGIC RH(D): CPT

## 2017-07-26 PROCEDURE — 37000008 HC ANESTHESIA 1ST 15 MINUTES: Performed by: OBSTETRICS & GYNECOLOGY

## 2017-07-26 PROCEDURE — 62322 NJX INTERLAMINAR LMBR/SAC: CPT | Performed by: STUDENT IN AN ORGANIZED HEALTH CARE EDUCATION/TRAINING PROGRAM

## 2017-07-26 PROCEDURE — 51702 INSERT TEMP BLADDER CATH: CPT

## 2017-07-26 PROCEDURE — 63600175 PHARM REV CODE 636 W HCPCS: Performed by: STUDENT IN AN ORGANIZED HEALTH CARE EDUCATION/TRAINING PROGRAM

## 2017-07-26 PROCEDURE — 80053 COMPREHEN METABOLIC PANEL: CPT

## 2017-07-26 PROCEDURE — 36415 COLL VENOUS BLD VENIPUNCTURE: CPT

## 2017-07-26 PROCEDURE — 99243 OFF/OP CNSLTJ NEW/EST LOW 30: CPT | Mod: 25,S$GLB,, | Performed by: OBSTETRICS & GYNECOLOGY

## 2017-07-26 PROCEDURE — S0028 INJECTION, FAMOTIDINE, 20 MG: HCPCS | Performed by: ANESTHESIOLOGY

## 2017-07-26 PROCEDURE — 25000003 PHARM REV CODE 250: Performed by: STUDENT IN AN ORGANIZED HEALTH CARE EDUCATION/TRAINING PROGRAM

## 2017-07-26 PROCEDURE — 76819 FETAL BIOPHYS PROFIL W/O NST: CPT | Mod: S$GLB,,, | Performed by: OBSTETRICS & GYNECOLOGY

## 2017-07-26 RX ORDER — ONDANSETRON 2 MG/ML
4 INJECTION INTRAMUSCULAR; INTRAVENOUS EVERY 12 HOURS PRN
Status: DISCONTINUED | OUTPATIENT
Start: 2017-07-26 | End: 2017-07-29 | Stop reason: HOSPADM

## 2017-07-26 RX ORDER — ACETAMINOPHEN 325 MG/1
650 TABLET ORAL EVERY 6 HOURS PRN
Status: DISCONTINUED | OUTPATIENT
Start: 2017-07-26 | End: 2017-07-29 | Stop reason: HOSPADM

## 2017-07-26 RX ORDER — SODIUM CITRATE AND CITRIC ACID MONOHYDRATE 334; 500 MG/5ML; MG/5ML
30 SOLUTION ORAL
Status: CANCELLED | OUTPATIENT
Start: 2017-07-26

## 2017-07-26 RX ORDER — KETOROLAC TROMETHAMINE 30 MG/ML
INJECTION, SOLUTION INTRAMUSCULAR; INTRAVENOUS
Status: DISCONTINUED | OUTPATIENT
Start: 2017-07-26 | End: 2017-07-26

## 2017-07-26 RX ORDER — LABETALOL 200 MG/1
200 TABLET, FILM COATED ORAL EVERY 12 HOURS
Status: DISCONTINUED | OUTPATIENT
Start: 2017-07-26 | End: 2017-07-29 | Stop reason: HOSPADM

## 2017-07-26 RX ORDER — SODIUM CITRATE AND CITRIC ACID MONOHYDRATE 334; 500 MG/5ML; MG/5ML
30 SOLUTION ORAL
Status: DISCONTINUED | OUTPATIENT
Start: 2017-07-26 | End: 2017-07-29 | Stop reason: HOSPADM

## 2017-07-26 RX ORDER — DIPHENHYDRAMINE HYDROCHLORIDE 50 MG/ML
25 INJECTION INTRAMUSCULAR; INTRAVENOUS EVERY 4 HOURS PRN
Status: DISCONTINUED | OUTPATIENT
Start: 2017-07-26 | End: 2017-07-29 | Stop reason: HOSPADM

## 2017-07-26 RX ORDER — OXYTOCIN/RINGER'S LACTATE 20/1000 ML
PLASTIC BAG, INJECTION (ML) INTRAVENOUS
Status: DISCONTINUED | OUTPATIENT
Start: 2017-07-26 | End: 2017-07-26

## 2017-07-26 RX ORDER — MISOPROSTOL 200 UG/1
800 TABLET ORAL
Status: DISCONTINUED | OUTPATIENT
Start: 2017-07-26 | End: 2017-07-29 | Stop reason: HOSPADM

## 2017-07-26 RX ORDER — SODIUM CHLORIDE, SODIUM LACTATE, POTASSIUM CHLORIDE, CALCIUM CHLORIDE 600; 310; 30; 20 MG/100ML; MG/100ML; MG/100ML; MG/100ML
1000 INJECTION, SOLUTION INTRAVENOUS CONTINUOUS
Status: DISCONTINUED | OUTPATIENT
Start: 2017-07-26 | End: 2017-07-26

## 2017-07-26 RX ORDER — NALBUPHINE HYDROCHLORIDE 20 MG/ML
2.5 INJECTION, SOLUTION INTRAMUSCULAR; INTRAVENOUS; SUBCUTANEOUS ONCE
Status: DISCONTINUED | OUTPATIENT
Start: 2017-07-26 | End: 2017-07-29 | Stop reason: HOSPADM

## 2017-07-26 RX ORDER — OXYCODONE AND ACETAMINOPHEN 10; 325 MG/1; MG/1
1 TABLET ORAL EVERY 4 HOURS PRN
Status: DISCONTINUED | OUTPATIENT
Start: 2017-07-26 | End: 2017-07-29 | Stop reason: HOSPADM

## 2017-07-26 RX ORDER — MORPHINE SULFATE 1 MG/ML
INJECTION, SOLUTION EPIDURAL; INTRATHECAL; INTRAVENOUS
Status: DISCONTINUED | OUTPATIENT
Start: 2017-07-26 | End: 2017-07-26

## 2017-07-26 RX ORDER — DIPHENHYDRAMINE HCL 25 MG
25 CAPSULE ORAL EVERY 4 HOURS PRN
Status: DISCONTINUED | OUTPATIENT
Start: 2017-07-26 | End: 2017-07-29 | Stop reason: HOSPADM

## 2017-07-26 RX ORDER — ONDANSETRON HYDROCHLORIDE 2 MG/ML
INJECTION, SOLUTION INTRAMUSCULAR; INTRAVENOUS
Status: DISCONTINUED | OUTPATIENT
Start: 2017-07-26 | End: 2017-07-26

## 2017-07-26 RX ORDER — ONDANSETRON 8 MG/1
8 TABLET, ORALLY DISINTEGRATING ORAL EVERY 8 HOURS PRN
Status: DISCONTINUED | OUTPATIENT
Start: 2017-07-26 | End: 2017-07-29 | Stop reason: HOSPADM

## 2017-07-26 RX ORDER — AMOXICILLIN 250 MG
1 CAPSULE ORAL NIGHTLY PRN
Status: DISCONTINUED | OUTPATIENT
Start: 2017-07-26 | End: 2017-07-29 | Stop reason: HOSPADM

## 2017-07-26 RX ORDER — OXYTOCIN 10 [USP'U]/ML
INJECTION, SOLUTION INTRAMUSCULAR; INTRAVENOUS
Status: DISCONTINUED | OUTPATIENT
Start: 2017-07-26 | End: 2017-07-26

## 2017-07-26 RX ORDER — MAGNESIUM SULFATE HEPTAHYDRATE 40 MG/ML
2 INJECTION, SOLUTION INTRAVENOUS CONTINUOUS
Status: DISCONTINUED | OUTPATIENT
Start: 2017-07-26 | End: 2017-07-29 | Stop reason: HOSPADM

## 2017-07-26 RX ORDER — SODIUM CHLORIDE, SODIUM LACTATE, POTASSIUM CHLORIDE, CALCIUM CHLORIDE 600; 310; 30; 20 MG/100ML; MG/100ML; MG/100ML; MG/100ML
INJECTION, SOLUTION INTRAVENOUS CONTINUOUS
Status: DISCONTINUED | OUTPATIENT
Start: 2017-07-26 | End: 2017-07-26

## 2017-07-26 RX ORDER — FENTANYL CITRATE 50 UG/ML
INJECTION, SOLUTION INTRAMUSCULAR; INTRAVENOUS
Status: DISCONTINUED | OUTPATIENT
Start: 2017-07-26 | End: 2017-07-26

## 2017-07-26 RX ORDER — FAMOTIDINE 10 MG/ML
20 INJECTION INTRAVENOUS
Status: CANCELLED | OUTPATIENT
Start: 2017-07-26

## 2017-07-26 RX ORDER — BISACODYL 10 MG
10 SUPPOSITORY, RECTAL RECTAL ONCE AS NEEDED
Status: ACTIVE | OUTPATIENT
Start: 2017-07-26 | End: 2017-07-26

## 2017-07-26 RX ORDER — LABETALOL 200 MG/1
200 TABLET, FILM COATED ORAL 2 TIMES DAILY
Status: ON HOLD | COMMUNITY
End: 2017-08-02 | Stop reason: HOSPADM

## 2017-07-26 RX ORDER — SODIUM CHLORIDE, SODIUM LACTATE, POTASSIUM CHLORIDE, CALCIUM CHLORIDE 600; 310; 30; 20 MG/100ML; MG/100ML; MG/100ML; MG/100ML
INJECTION, SOLUTION INTRAVENOUS CONTINUOUS
Status: CANCELLED | OUTPATIENT
Start: 2017-07-26

## 2017-07-26 RX ORDER — OXYCODONE AND ACETAMINOPHEN 5; 325 MG/1; MG/1
1 TABLET ORAL EVERY 4 HOURS PRN
Status: DISCONTINUED | OUTPATIENT
Start: 2017-07-26 | End: 2017-07-29 | Stop reason: HOSPADM

## 2017-07-26 RX ORDER — PHENYLEPHRINE HYDROCHLORIDE 10 MG/ML
INJECTION INTRAVENOUS
Status: DISCONTINUED | OUTPATIENT
Start: 2017-07-26 | End: 2017-07-26

## 2017-07-26 RX ORDER — FAMOTIDINE 10 MG/ML
20 INJECTION INTRAVENOUS
Status: DISCONTINUED | OUTPATIENT
Start: 2017-07-26 | End: 2017-07-29 | Stop reason: HOSPADM

## 2017-07-26 RX ORDER — CALCIUM GLUCONATE 98 MG/ML
1 INJECTION, SOLUTION INTRAVENOUS
Status: DISCONTINUED | OUTPATIENT
Start: 2017-07-26 | End: 2017-07-29 | Stop reason: HOSPADM

## 2017-07-26 RX ORDER — ADHESIVE BANDAGE
30 BANDAGE TOPICAL 2 TIMES DAILY PRN
Status: DISCONTINUED | OUTPATIENT
Start: 2017-07-27 | End: 2017-07-27

## 2017-07-26 RX ORDER — MORPHINE SULFATE 2 MG/ML
2 INJECTION, SOLUTION INTRAMUSCULAR; INTRAVENOUS
Status: DISPENSED | OUTPATIENT
Start: 2017-07-26 | End: 2017-07-27

## 2017-07-26 RX ORDER — MISOPROSTOL 100 UG/1
800 TABLET ORAL
Status: CANCELLED | OUTPATIENT
Start: 2017-07-26

## 2017-07-26 RX ORDER — SODIUM CHLORIDE, SODIUM LACTATE, POTASSIUM CHLORIDE, CALCIUM CHLORIDE 600; 310; 30; 20 MG/100ML; MG/100ML; MG/100ML; MG/100ML
INJECTION, SOLUTION INTRAVENOUS CONTINUOUS PRN
Status: DISCONTINUED | OUTPATIENT
Start: 2017-07-26 | End: 2017-07-26

## 2017-07-26 RX ORDER — NAPROXEN 500 MG/1
500 TABLET ORAL 2 TIMES DAILY WITH MEALS
Status: DISCONTINUED | OUTPATIENT
Start: 2017-07-26 | End: 2017-07-29 | Stop reason: HOSPADM

## 2017-07-26 RX ORDER — FAMOTIDINE 10 MG/ML
20 INJECTION INTRAVENOUS ONCE AS NEEDED
Status: COMPLETED | OUTPATIENT
Start: 2017-07-26 | End: 2017-07-26

## 2017-07-26 RX ORDER — SODIUM CHLORIDE, SODIUM LACTATE, POTASSIUM CHLORIDE, CALCIUM CHLORIDE 600; 310; 30; 20 MG/100ML; MG/100ML; MG/100ML; MG/100ML
INJECTION, SOLUTION INTRAVENOUS CONTINUOUS
Status: DISCONTINUED | OUTPATIENT
Start: 2017-07-26 | End: 2017-07-27

## 2017-07-26 RX ORDER — SODIUM CITRATE AND CITRIC ACID MONOHYDRATE 334; 500 MG/5ML; MG/5ML
30 SOLUTION ORAL ONCE AS NEEDED
Status: COMPLETED | OUTPATIENT
Start: 2017-07-26 | End: 2017-07-26

## 2017-07-26 RX ORDER — PROPOFOL 10 MG/ML
VIAL (ML) INTRAVENOUS
Status: DISCONTINUED | OUTPATIENT
Start: 2017-07-26 | End: 2017-07-26

## 2017-07-26 RX ORDER — MAGNESIUM SULFATE HEPTAHYDRATE 40 MG/ML
2 INJECTION, SOLUTION INTRAVENOUS ONCE
Status: COMPLETED | OUTPATIENT
Start: 2017-07-26 | End: 2017-07-26

## 2017-07-26 RX ORDER — ACETAMINOPHEN 500 MG
1000 TABLET ORAL EVERY 8 HOURS
Status: ACTIVE | OUTPATIENT
Start: 2017-07-27 | End: 2017-07-28

## 2017-07-26 RX ORDER — OXYTOCIN/RINGER'S LACTATE 20/1000 ML
41.65 PLASTIC BAG, INJECTION (ML) INTRAVENOUS CONTINUOUS
Status: ACTIVE | OUTPATIENT
Start: 2017-07-26 | End: 2017-07-27

## 2017-07-26 RX ORDER — SIMETHICONE 80 MG
1 TABLET,CHEWABLE ORAL EVERY 6 HOURS PRN
Status: DISCONTINUED | OUTPATIENT
Start: 2017-07-26 | End: 2017-07-29 | Stop reason: HOSPADM

## 2017-07-26 RX ADMIN — PHENYLEPHRINE HYDROCHLORIDE 100 MCG: 10 INJECTION INTRAVENOUS at 06:07

## 2017-07-26 RX ADMIN — PROPOFOL 20 MG: 10 INJECTION, EMULSION INTRAVENOUS at 06:07

## 2017-07-26 RX ADMIN — PHENYLEPHRINE HYDROCHLORIDE 100 MCG: 10 INJECTION INTRAVENOUS at 05:07

## 2017-07-26 RX ADMIN — OXYTOCIN 10 UNITS: 10 INJECTION, SOLUTION INTRAMUSCULAR; INTRAVENOUS at 05:07

## 2017-07-26 RX ADMIN — DEXTROSE 3 G: 50 INJECTION, SOLUTION INTRAVENOUS at 05:07

## 2017-07-26 RX ADMIN — SODIUM CITRATE AND CITRIC ACID MONOHYDRATE 30 ML: 500; 334 SOLUTION ORAL at 05:07

## 2017-07-26 RX ADMIN — Medication 20 UNITS: at 05:07

## 2017-07-26 RX ADMIN — SODIUM CHLORIDE, SODIUM LACTATE, POTASSIUM CHLORIDE, AND CALCIUM CHLORIDE: .6; .31; .03; .02 INJECTION, SOLUTION INTRAVENOUS at 10:07

## 2017-07-26 RX ADMIN — ONDANSETRON 4 MG: 2 INJECTION, SOLUTION INTRAMUSCULAR; INTRAVENOUS at 05:07

## 2017-07-26 RX ADMIN — FENTANYL CITRATE 10 MCG: 50 INJECTION, SOLUTION INTRAMUSCULAR; INTRAVENOUS at 05:07

## 2017-07-26 RX ADMIN — MAGNESIUM SULFATE IN WATER 2 G: 40 INJECTION, SOLUTION INTRAVENOUS at 07:07

## 2017-07-26 RX ADMIN — MAGNESIUM SULFATE IN WATER 2 G/HR: 40 INJECTION, SOLUTION INTRAVENOUS at 07:07

## 2017-07-26 RX ADMIN — PHENYLEPHRINE HYDROCHLORIDE 200 MCG: 10 INJECTION INTRAVENOUS at 05:07

## 2017-07-26 RX ADMIN — SODIUM CHLORIDE, SODIUM LACTATE, POTASSIUM CHLORIDE, AND CALCIUM CHLORIDE: .6; .31; .03; .02 INJECTION, SOLUTION INTRAVENOUS at 04:07

## 2017-07-26 RX ADMIN — KETOROLAC TROMETHAMINE 30 MG: 30 INJECTION, SOLUTION INTRAMUSCULAR; INTRAVENOUS at 06:07

## 2017-07-26 RX ADMIN — FAMOTIDINE 20 MG: 10 INJECTION, SOLUTION INTRAVENOUS at 05:07

## 2017-07-26 RX ADMIN — LABETALOL HYDROCHLORIDE 200 MG: 200 TABLET, FILM COATED ORAL at 08:07

## 2017-07-26 RX ADMIN — PROPOFOL 20 MG: 10 INJECTION, EMULSION INTRAVENOUS at 05:07

## 2017-07-26 RX ADMIN — MORPHINE SULFATE 0.2 MG: 1 INJECTION, SOLUTION EPIDURAL; INTRATHECAL; INTRAVENOUS at 05:07

## 2017-07-26 RX ADMIN — Medication 20 UNITS: at 06:07

## 2017-07-26 RX ADMIN — SODIUM CHLORIDE, SODIUM LACTATE, POTASSIUM CHLORIDE, AND CALCIUM CHLORIDE: .6; .31; .03; .02 INJECTION, SOLUTION INTRAVENOUS at 05:07

## 2017-07-26 NOTE — TRANSFER OF CARE
"Anesthesia Transfer of Care Note    Patient: Corrine Hicks    Procedure(s) Performed: Procedure(s) (LRB):  DELIVERY- SECTION (N/A)    Patient location: Labor and Delivery    Anesthesia Type: spinal    Transport from OR: Transported from OR on room air with adequate spontaneous ventilation    Post pain: adequate analgesia    Post assessment: no apparent anesthetic complications    Post vital signs: stable    Level of consciousness: awake, alert and oriented    Nausea/Vomiting: no nausea/vomiting    Complications: none    Transfer of care protocol was followed      Last vitals:   18:36  Bp: 140/76  Hr: 70  SpO2: 99%      Visit Vitals  Ht 5' 5.5" (1.664 m)   Wt 125.6 kg (277 lb)   LMP 2016 (Exact Date)   Breastfeeding? No   BMI 45.39 kg/m²     "

## 2017-07-26 NOTE — BRIEF OP NOTE
36.5 weeks  chtn with superimposed pre-e, severe features, headache  Rpt low trans C section, large male, 3 gms ancef  2 preps, urine clear, ebl 350  No complicatons  Mwiedemann/justyn  July 26 date

## 2017-07-26 NOTE — LETTER
July 26, 2017      Michael A. Wiedemann, MD  [address] [address] [address] [place] [id number]           [facility]  [address]  [facility] [address]  Phone: [phone]          Patient: Corrine Hicks   MR Number: [id number]   YOB: 1985   Date of Visit: 7/26/2017       Dear Dr. Michael A. Wiedemann:    Thank you for referring Corrine Hicks to me for evaluation. Attached you will find relevant portions of my assessment and plan of care.    If you have questions, please do not hesitate to call me. I look forward to following Corrine Hicks along with you.    Sincerely,    Michael A. Wiedemann, MD    Enclosure  CC:  No Recipients    If you would like to receive this communication electronically, please contact [email or web address] or [phone] to request more information on [employer] access.    For providers and/or their staff who would like to refer a patient to [facility], please contact us through our one-stop-shop provider referral line, [facility], at [phone].    If you feel you have received this communication in error or would no longer like to receive these types of communications, please e-mail [email or web address]

## 2017-07-26 NOTE — ANESTHESIA PROCEDURE NOTES
Spinal    Diagnosis: pregnancy,   Patient location during procedure: OB  Start time: 2017 5:30 PM  Timeout: 2017 5:30 PM  End time: 2017 5:45 PM  Staffing  Anesthesiologist: IRIS MONTE  Resident/CRNA: FILOMENA KINGSTON  Performed: anesthesiologist   Preanesthetic Checklist  Completed: patient identified, site marked, surgical consent, pre-op evaluation, timeout performed, IV checked, risks and benefits discussed and monitors and equipment checked  Spinal Block  Patient position: sitting  Prep: ChloraPrep  Patient monitoring: heart rate and continuous pulse ox  Approach: midline  Location: L3-4  Injection technique: single shot  CSF Fluid: clear free-flowing CSF  Needle  Needle type: pencil-tip   Needle gauge: 25 G  Needle length: 3.5 in  Additional Documentation: negative aspiration for heme  Needle localization: anatomical landmarks  Assessment  Sensory level: T6   Dermatomal levels determined by alcohol wipe  Ease of block: easy  Patient's tolerance of the procedure: comfortable throughout block and no complaints  Medications:  Bolus administered: 1.6 mL of 0.75 bupivacaine  Opioid administered: 10 mcg of   fentanyl

## 2017-07-26 NOTE — PROGRESS NOTES
Patient advised to go directly to L and D at [place] at approximately 245pm this afternoon for delivery. Full consult to follow.

## 2017-07-26 NOTE — ANESTHESIA PREPROCEDURE EVALUATION
2017  Corrine Hicks is a 32 y.o., female with prex and DM for repeat .    Anesthesia Evaluation    I have reviewed the Patient Summary Reports.    I have reviewed the Nursing Notes.   I have reviewed the Medications.     Review of Systems  Anesthesia Hx:   Denies Personal Hx of Anesthesia complications.   Hematology/Oncology:     Oncology Normal     EENT/Dental:   chronic allergic rhinitis   Cardiovascular:   Exercise tolerance: good Hypertension (gestational)    Pulmonary:  Pulmonary Normal    Hepatic/GI:  Hepatic/GI Normal    Neurological:  Neurology Normal    Endocrine:   Diabetes        Physical Exam  General:  Obesity    Airway/Jaw/Neck:  Airway Findings: Mouth Opening: Normal Tongue: Normal  General Airway Assessment: Adult  Mallampati: II  TM Distance: Normal, at least 6 cm      Dental:  Dental Findings: In tact    Chest/Lungs:  Chest/Lungs Clear    Heart/Vascular:  Heart Findings: Normal          Lab Results   Component Value Date    WBC 8.08 2017    HGB 10.8 (L) 2017    HCT 32.2 (L) 2017    MCV 85 2017     (H) 2017           Anesthesia Plan  Type of Anesthesia, risks & benefits discussed:  Anesthesia Type:  spinal  Patient's Preference:   Intra-op Monitoring Plan:   Intra-op Monitoring Plan Comments:   Post Op Pain Control Plan:   Post Op Pain Control Plan Comments:   Induction:   IV  Beta Blocker:  Patient is not currently on a Beta-Blocker (No further documentation required).       Informed Consent: Patient understands risks and agrees with Anesthesia plan.  Questions answered. Anesthesia consent signed with patient.  ASA Score: 2  emergent   Day of Surgery Review of History & Physical:            Ready For Surgery From Anesthesia Perspective.

## 2017-07-26 NOTE — PROGRESS NOTES
Indication  ========    Consultation. Fetal anatomy survey Hypertension.    History  ======    General History  Height 165 cm  Height (ft) 5 ft  Height (in) 5 in  Other: OB: MICHAEL WIEDEMANN  Medical History  Past surgical history: Previous surgeries performed  Surgery:  section  Year   Surgery: adenoids  Previous Outcomes   2  Para 1  [facility] children born living (T) 1  [facility] children born (T) 1  Thomas living children (L) 1  Risk Factors  History risk factors: Obesity  History risk factors: Personal history of hypertension  History risk factors: Hx GDM    Pregnancy History  ==============    Maternal Lab Tests  Result: declined screening  Wants to know gender: yes    Maternal Assessment  =================    Height 165 cm  Height (ft) 5 ft  Height (in) 5 in  BP syst 155 mmHg  BP diast 99 mmHg    Method  ======    Transabdominal ultrasound examination. View: Suboptimal view: restricted by patient discomfort. Suboptimal view: limited by maternal body  habitus. Suboptimal view: limited by fetal position. Suboptimal view: limited by late gestational age.    Pregnancy  =========    Thomas pregnancy. Number of fetuses: 1.    Dating  ======    LMP on: 2016  Cycle: regular cycle  GA by LMP 35 w + 6 d  GRAY by LMP: 2017  Ultrasound examination on: 2017  GA by U/S based upon: AC, BPD, Femur, HC  GA by U/S 37 w + 4 d  GRAY by U/S: 2017  Assigned: The Best Overall Assessment is based on the LMP.  Assigned GA 35 w + 6 d  Assigned GRAY: 2017    General Evaluation  ==============    Cardiac activity: present.  bpm.  Fetal movements: visualized.  Presentation: cephalic.  Placenta:  Placental site: posterior.  Umbilical cord: 3 vessel cord, normal.  Amniotic fluid: Amount of AF: normal amount. MVP 8.1 cm. XIN 24.7 cm. Q1 6.0 cm, Q2 8.1 cm, Q3 5.1 cm, Q4 5.5 cm.    Biophysical Profile  ==============    0: Fetal breathing movements  2: Gross body movements  2: Fetal  tone  2: Amniotic fluid volume  6/8: Biophysical profile score    Fetal Biometry  ============    Fetal Biometry  BPD 93.2 mm 37w 6d Hadlock  .0 mm  .1 mm 38w 6d Hadlock  .2 mm 37w 5d Hadlock  Femur 69.3 mm 35w 4d Hadlock  Humerus 63.1 mm 36w 4d Chen  EFW 3,187 g 66% Davis  Calculated by: Hadlock (BPD-HC-AC-FL)  EFW (lb) 7 lb  EFW (oz) 0 oz  Cephalic index 0.78  HC / AC 1.00  FL / BPD 0.74  FL / AC 0.20  MVP 8.1 cm  XIN 24.7 cm   bpm    Fetal Anatomy  ===========    Cranium: normal  Midline falx: suboptimal  Cavum septi pellucidi: normal  Cerebellum: suboptimal  Cisterna magna: suboptimal  Head shape: normal  Rt lateral ventricle: suboptimal  Lt lateral ventricle: suboptimal  Rt choroid plexus: suboptimal  Lt choroid plexus: suboptimal  Parenchyma: visualized  Third ventricle: suboptimal  Posterior fossa: suboptimal  Cerebellar lobes: suboptimal  Vermis: suboptimal  Neck: appears normal  Lips: suboptimal  Profile: suboptimal  Nose: suboptimal  Maxilla: suboptimal  Mandible: suboptimal  4-chamber view: suboptimal  RVOT: suboptimal  LVOT: suboptimal  Heart / Thorax: Septal views: normal  Situs: normal  Aortic arch: suboptimal  Ductal arch: suboptimal  SVC: suboptimal  IVC: suboptimal  3-vessel view: suboptimal  3-vessel-trachea view: suboptimal  Cardiac position: normal  Cardiac axis: normal  Cardiac size: normal  Cardiac rhythm: normal  Rt lung: normal  Lt lung: normal  Diaphragm: suboptimal  Cord insertion: suboptimal  Stomach: normal  Kidneys: normal  Bladder: normal  Genitals: visualized  Abdom. wall: appears normal  Abdom. cavity: normal  Rt kidney: normal  Lt kidney: normal  Liver: normal  Bowel: normal  Cervical spine: normal  Thoracic spine: normal  Lumbar spine: normal  Sacral spine: normal  Rt arm: suboptimal  Lt arm: visualized  Rt hand: suboptimal  Lt upper arm: normal  Lt hand: suboptimal  Rt leg: visualized  Lt leg: visualized  Rt upper leg: normal  Rt lower  leg: suboptimal  Rt foot: normal  Lt upper leg: normal  Lt lower leg: suboptimal  Lt foot: normal  Position of hands: suboptimal  Position of feet: suboptimal  Gender: male  Wants to know gender: yes    Consultation  ==========    Type: HTN/delivery planning.  Ms. Hicks is a 31y/o  at 35 and 6 or 36 weeks depending on GRAY used (17 in [facility] and 17 in epic) who presents  for consult due to hypertension and delivery timing.  Today, she reports having a headache, she vomited once, and overall does not feel well. She reports that her blood pressures at home have  ranged from 149-190/ over the past day. She did not report any other preeclampsia symptoms.  She did not report any other pregnancy related complaints.  She had hypertension back in  but lost 75 pounds and then did not develop hypertension again until 17 per the patient. On 17 her  bp was 140/82. The records in [facility] are limited with only one bp before 20 weeks. In one ER visit remotely she had a high blood pressure but  others that I saw were normal. She started labetolol at 29 weeks and has had to increase the dose. Currently, she is on 200mg bid. BP in  office earlier in week was 160/98 and they improved. P/C on  was .19. On , LDH was 133, Plt 313, Cr 0.7, AST 14, ALT 12, P/C 0.18.  On 17, her 24 hour urine protein was unable to be calculated.      PMHx: Mild cardiomegaly on Xray-no echo or cardiac workup-no symptoms  HTN in the past that resolved with weight loss  Nausea and vomiting of pregnancy    PSHx: Tonsillectomy,     Social: Former smoker, no longer smokes, no alcohol, no drug use    Family history: No history of birth defects, low risk quad screen    POBHx: 1 term csection at 37 weeks with GDM and HTN at her last OB visit    Meds: Labetolol 200mg PO bid, Zofran prn    /99 and 158/100    A/P 1. IUP at 35 and 6 or 36 weeks depending on GRAY used    2. HTN: The patient may have a  component of chronic hypertension. Given limited records, this cannot be confirmed. However, she has  worsening hypertension with a headache. Therefore, she meets criteria for preeclampsia with severe features. Delivery is recommended at this  time. I recommend admission, continuous fetal monitoring, preeclampsia labs, and magnesium sulfate and notifying anesthesia. Blood  pressures should be treated with IV anti-hypertensives to keep less than 155/100 intrapartum and less than 150/100 postpartum. Magnesium  sulfate should continue for 24 hour postpartum. I would not delay her csection for late  betamethasone. If headache does not resolve or  other neuro signs develop, imaging would be warranted. Cardiomegaly was listed on her problem list. The patient denies any cardiac problems.  No echo was done. The cardiomegaly was mild on a chest x ray which could be a false read. Risks of magnesium sulfate were reviewed with  the patient. If the patient develops any cardiac signs or symptoms, workup should be done and echo should be done. Anesthesia should be  aware. If she continues to remain asymptomatic, postpartum cardiac evaluation would be recommended. Maternal and fetal risks and morbidity  and mortality of hypertension were discussed. The patient has someone to drive her from [facility] to [place] and the T are currently  reassuring. Please also see [facility] hypertensive guidelines.    3. GDM: The patient has been controlling GDM with her diet. Blood sugars fasting have been 90 to 110 and 2 hours postprandial have been  . Mild polyhydramnios was noted. Risks of GDM were reviewed with the patient.    4. Risks of prematurity were reviewed.    5. BPP 6/8-Recommend patient go directly to [place] without stopping for evaluation and placement on monitor. Delivery timing tonight will be  dependent on tracing, blood pressures, labs, anesthesia and patient's NPO status. Patient was advised to remain NPO. If fetal  status is non  reactive or there are any concerns with the tracing, delivery should not be delayed.    6. Polyhydramnios: We reviewed potential etiologies of the disorder. Most likely in her case is the GDM with increased fasting values.    A total of 45 minutes was spent with greater than half of that time spent in counseling and coordination of care.        Impression  =========    Thomas live intrauterine pregnancy.  Overall normal fetal growth.  Mild polyhydramnios was noted.  Much of the anatomy is suboptimally visualized. The anatomy that was seen appeared normal.  Placenta is posterior. No obvious previa noted.    Recommendation  ==============    To L and D at [place] for delivery.  Case discussed with Dr. Wiedemann.

## 2017-07-27 ENCOUNTER — TELEPHONE (OUTPATIENT)
Dept: OBSTETRICS AND GYNECOLOGY | Facility: CLINIC | Age: 32
End: 2017-07-27

## 2017-07-27 LAB
BASOPHILS # BLD AUTO: 0.01 K/UL
BASOPHILS NFR BLD: 0.1 %
DIFFERENTIAL METHOD: ABNORMAL
EOSINOPHIL # BLD AUTO: 0 K/UL
EOSINOPHIL NFR BLD: 0.2 %
ERYTHROCYTE [DISTWIDTH] IN BLOOD BY AUTOMATED COUNT: 13.3 %
HCT VFR BLD AUTO: 27.8 %
HGB BLD-MCNC: 9.5 G/DL
LYMPHOCYTES # BLD AUTO: 1.3 K/UL
LYMPHOCYTES NFR BLD: 12.7 %
MCH RBC QN AUTO: 29.1 PG
MCHC RBC AUTO-ENTMCNC: 34.2 G/DL
MCV RBC AUTO: 85 FL
MONOCYTES # BLD AUTO: 0.7 K/UL
MONOCYTES NFR BLD: 6.6 %
NEUTROPHILS # BLD AUTO: 8.1 K/UL
NEUTROPHILS NFR BLD: 80.2 %
PLATELET # BLD AUTO: 284 K/UL
PMV BLD AUTO: 8.7 FL
RBC # BLD AUTO: 3.26 M/UL
RPR SER QL: NORMAL
WBC # BLD AUTO: 10.15 K/UL

## 2017-07-27 PROCEDURE — 25000003 PHARM REV CODE 250: Performed by: OBSTETRICS & GYNECOLOGY

## 2017-07-27 PROCEDURE — 63600175 PHARM REV CODE 636 W HCPCS: Performed by: STUDENT IN AN ORGANIZED HEALTH CARE EDUCATION/TRAINING PROGRAM

## 2017-07-27 PROCEDURE — 85025 COMPLETE CBC W/AUTO DIFF WBC: CPT

## 2017-07-27 PROCEDURE — 11000001 HC ACUTE MED/SURG PRIVATE ROOM

## 2017-07-27 PROCEDURE — 36415 COLL VENOUS BLD VENIPUNCTURE: CPT

## 2017-07-27 RX ORDER — OXYCODONE AND ACETAMINOPHEN 5; 325 MG/1; MG/1
1 TABLET ORAL EVERY 4 HOURS PRN
Qty: 30 TABLET | Refills: 0 | Status: SHIPPED | OUTPATIENT
Start: 2017-07-27 | End: 2017-08-11

## 2017-07-27 RX ORDER — NAPROXEN SODIUM 550 MG/1
550 TABLET ORAL 2 TIMES DAILY WITH MEALS
Qty: 20 TABLET | Refills: 3 | Status: SHIPPED | OUTPATIENT
Start: 2017-07-27 | End: 2017-08-11

## 2017-07-27 RX ADMIN — NAPROXEN 500 MG: 500 TABLET ORAL at 08:07

## 2017-07-27 RX ADMIN — LABETALOL HYDROCHLORIDE 200 MG: 200 TABLET, FILM COATED ORAL at 08:07

## 2017-07-27 RX ADMIN — OXYCODONE AND ACETAMINOPHEN 1 TABLET: 5; 325 TABLET ORAL at 06:07

## 2017-07-27 RX ADMIN — OXYCODONE AND ACETAMINOPHEN 1 TABLET: 5; 325 TABLET ORAL at 10:07

## 2017-07-27 RX ADMIN — MORPHINE SULFATE 2 MG: 2 INJECTION, SOLUTION INTRAMUSCULAR; INTRAVENOUS at 02:07

## 2017-07-27 RX ADMIN — NAPROXEN 500 MG: 500 TABLET ORAL at 06:07

## 2017-07-27 RX ADMIN — LABETALOL HYDROCHLORIDE 200 MG: 200 TABLET, FILM COATED ORAL at 10:07

## 2017-07-27 RX ADMIN — OXYCODONE AND ACETAMINOPHEN 1 TABLET: 5; 325 TABLET ORAL at 08:07

## 2017-07-27 NOTE — PLAN OF CARE
Problem: Patient Care Overview  Goal: Plan of Care Review  Outcome: Ongoing (interventions implemented as appropriate)   preEclampsia diet controlled gestational diabetic Repeat C/Section at 36 weeks for PreEclampsia, baby in NICU with hypoglycemia, patient on Magnesium therapy.

## 2017-07-27 NOTE — PLAN OF CARE
Problem: Breastfeeding (Adult,Obstetrics,Pediatric)  Goal: Signs and Symptoms of Listed Potential Problems Will be Absent, Minimized or Managed (Breastfeeding)  Signs and symptoms of listed potential problems will be absent, minimized or managed by discharge/transition of care (reference Breastfeeding (Adult,Obstetrics,Pediatric) CPG).   Outcome: Ongoing (interventions implemented as appropriate)  Mother will pump her breasts 8 or more times/24 hours for 15-20 minutes as instructed. Will follow instructions for setup, cleaning, and labeling bottle.  Will do massage and hand expression with each pumping. Will call with needs.

## 2017-07-27 NOTE — PLAN OF CARE
Problem: Patient Care Overview  Goal: Individualization & Mutuality  Plan of care discussed  With  Pt+ understanding

## 2017-07-27 NOTE — PLAN OF CARE
2040  Breast pump initiated.  Pt instructed in use  0130  Pt taken to NICU via wheelchair to see baby.  0200  Pt resettled, medicated with morphine 2mg for c/o incisional pain and settled for sleep.

## 2017-07-27 NOTE — PROGRESS NOTES
Post op day 1 from rpt c secton at 36 weeks with CHTN with superimposed pih/pre-e with severe features (headaches)  VSS, bp good, on labetalol bid  Urine output great, clear  Pt edgardo fluids well  abd soft, inc dry  extr normal, mod edema  Labs stable  Assessment stable  Plan, progressive care, dc mag, cont labet.

## 2017-07-27 NOTE — ANESTHESIA POSTPROCEDURE EVALUATION
"Anesthesia Post Evaluation    Patient: Corrine Hicks    Procedure(s) Performed: Procedure(s) (LRB):  DELIVERY- SECTION (N/A)    Final Anesthesia Type: spinal  Patient location during evaluation: labor & delivery  Patient participation: Yes- Able to Participate  Level of consciousness: awake and alert and oriented  Post-procedure vital signs: reviewed and stable  Pain management: adequate  Airway patency: patent  PONV status at discharge: No PONV  Anesthetic complications: no      Cardiovascular status: blood pressure returned to baseline and hemodynamically stable  Respiratory status: unassisted, spontaneous ventilation and room air  Hydration status: euvolemic  Follow-up not needed.        Visit Vitals  /67 (BP Location: Left arm, Patient Position: Lying, BP Method: Automatic)   Pulse 71   Temp 36.6 °C (97.8 °F) (Oral)   Resp 18   Ht 5' 5.5" (1.664 m)   Wt 125.6 kg (277 lb)   LMP 2016 (Exact Date)   SpO2 100%   Breastfeeding? Yes   BMI 45.39 kg/m²       Pain/Philip Score: Pain Rating Prior to Med Admin: 5 (2017  8:45 AM)  Pain Rating Post Med Admin: 0 (2017  9:58 AM)    No catheter in back  No headache/neckache/backache  Full return of neurological function  Able to urinate  "

## 2017-07-27 NOTE — LACTATION NOTE
17 0805   Infant Assessment   Medical Condition late ;hypoglycemia   Maternal Infant Feeding   Breastfeeding Education label/storage of breast milk;increasing milk supply;milk expression, electric pump;milk expression, hand   Breastfeeding History   Currently Breastfeeding no   Equipment Type/Education   Pump Type Symphony   Breast Pump Type double electric, hospital grade   Breast Pump Flange Type hard   Breast Pumping massage pre pumping   Pumping Frequency (times) 8 # of times pumped   Lactation Interventions   Breastfeeding Assistance milk expression/pumping   Maternal Breastfeeding Support encouragement offered;lactation counseling provided

## 2017-07-27 NOTE — PLAN OF CARE
1100 - received report from INO Carey RN.  Assumed care of pt.  Pt visiting infant in NICU    1200 - vss, nad, pain well controlled w/po pain meds, tolerating regular diet.  POC: pain management, continue to monitor.  Informed pt to notify nurse if she starts to experience any n/v, severe h/a, blurred vision, faint, CP, SOB.  Pt verbalized understanding.

## 2017-07-28 PROCEDURE — 25000003 PHARM REV CODE 250: Performed by: OBSTETRICS & GYNECOLOGY

## 2017-07-28 PROCEDURE — 11000001 HC ACUTE MED/SURG PRIVATE ROOM

## 2017-07-28 RX ADMIN — NAPROXEN 500 MG: 500 TABLET ORAL at 09:07

## 2017-07-28 RX ADMIN — LABETALOL HYDROCHLORIDE 200 MG: 200 TABLET, FILM COATED ORAL at 09:07

## 2017-07-28 RX ADMIN — OXYCODONE AND ACETAMINOPHEN 1 TABLET: 5; 325 TABLET ORAL at 02:07

## 2017-07-28 RX ADMIN — OXYCODONE AND ACETAMINOPHEN 1 TABLET: 5; 325 TABLET ORAL at 09:07

## 2017-07-28 NOTE — PLAN OF CARE
Problem: Patient Care Overview  Goal: Plan of Care Review  Outcome: Ongoing (interventions implemented as appropriate)  Mother will pump with Symphony breastpump at least eight or more times in 24 hours. Will keep track of feedings and wet and dirty diapers. Will call with any breastfeeding needs.

## 2017-07-28 NOTE — OP NOTE
DATE OF PROCEDURE:  2017.    SURGEON:  Michael Wiedemann, M.D.    ASSISTANT:  Krystina Brantley.    PROCEDURE:  Repeat low transverse .    PREOPERATIVE DIAGNOSES:  Intrauterine pregnancy at 36 plus weeks,   pregnancy-induced hypertension over chronic hypertension with severe features.    POSTOPERATIVE DIAGNOSES:  Intrauterine pregnancy at 36 plus weeks,   pregnancy-induced hypertension over chronic hypertension with severe features.    PROCEDURE IN DETAIL:  Spinal anesthesia was placed.  Then, 3 g of Ancef were   administered.  The patient was prepped and draped in the usual fashion.  A   Pfannenstiel incision just below the previous scar was made.  The abdomen was   opened in layers without complications.  A transverse bladder flap followed by a   transverse uterine incision was made.  Clear amniotic fluid was encountered and   a healthy male infant was delivered.  The baby eventually weighed 8 pounds 5   ounces.  The placenta was delivered.  The uterus was inspected, wiped clean and   then closed with #1 chromic suture from either side interlocking.  This achieved   good approximation and hemostasis.  We inspected the abdomen several times,   there was no bleeding.  All pedicles were dry and all counts were correct.  The   urine output was good and clear and the blood loss at this time was ____.  With   this, we closed using chromic suture on the peritoneum and rectus muscle, Vicryl   suture on the fascia, plain gut suture in the subcutaneous tissue and   absorbable skin staples on the skin.  A thin linear strip of Dermabond was   placed over the incision.  The patient tolerated the procedure well.  There were   no complications.  She was taken to [facility] in good condition.      SAAD  dd: 2017 16:24:20 (CDT)  td: 2017 19:30:26 (CD)  Doc ID   #[id number]  Job ID #[id number]    CC:

## 2017-07-28 NOTE — LACTATION NOTE
Follow-up with pt done. States she hasn't been pumping much. Says she plans on pumping more once she is home and in a routine. Encouraged early and frequent pumping 8+/24. Discussed possibility of allowing baby to latch on to feed. Pt states baby is a poor feeder and not able to nurse right now but she will try tomorrow if ok with ped. Spoke to JOSE Au about mom's desire to try and nurse. Talked about correct flange size. Also pt had questions about drinking alcohol and breastfeeding. Informed her of guidelines regarding how long to wait after consumption of alcohol. Pt states she has no further questions or needs.

## 2017-07-28 NOTE — PROGRESS NOTES
Post  section day #2    Patient is afebrile.  Her blood pressure is normalizing.  Baby is in the NICU and mother is visiting their.  Continue progressive care with close blood pressure monitoring.  Continue same medications.  If stable will discharge tomorrow.  Baby B may need to remain in hospital.  Rooming-in consideration.

## 2017-07-28 NOTE — H&P
32 y;o  at 36 weeks, admited for rpt section for elevated bp with severe features.  pmh neg  psh c section  Sh neg  Allergies none  pe bp elevated,   Head/neck normal  abd gravid  extr edema  assessmetn as above  Plan, rpt section

## 2017-07-29 VITALS
OXYGEN SATURATION: 99 % | SYSTOLIC BLOOD PRESSURE: 156 MMHG | BODY MASS INDEX: 44.52 KG/M2 | DIASTOLIC BLOOD PRESSURE: 88 MMHG | RESPIRATION RATE: 18 BRPM | HEART RATE: 78 BPM | TEMPERATURE: 99 F | WEIGHT: 277 LBS | HEIGHT: 66 IN

## 2017-07-29 LAB — BACTERIA SPEC AEROBE CULT: NORMAL

## 2017-07-29 PROCEDURE — 63600175 PHARM REV CODE 636 W HCPCS: Performed by: OBSTETRICS & GYNECOLOGY

## 2017-07-29 PROCEDURE — 90471 IMMUNIZATION ADMIN: CPT | Performed by: OBSTETRICS & GYNECOLOGY

## 2017-07-29 PROCEDURE — 90715 TDAP VACCINE 7 YRS/> IM: CPT | Performed by: OBSTETRICS & GYNECOLOGY

## 2017-07-29 PROCEDURE — 25000003 PHARM REV CODE 250: Performed by: OBSTETRICS & GYNECOLOGY

## 2017-07-29 RX ORDER — OXYCODONE AND ACETAMINOPHEN 5; 325 MG/1; MG/1
1 TABLET ORAL EVERY 6 HOURS PRN
Qty: 30 TABLET | Refills: 0
Start: 2017-07-29 | End: 2017-08-11

## 2017-07-29 RX ORDER — NAPROXEN 500 MG/1
500 TABLET ORAL 2 TIMES DAILY WITH MEALS
Qty: 30 TABLET | Refills: 2 | Status: ON HOLD
Start: 2017-07-29 | End: 2017-08-02 | Stop reason: HOSPADM

## 2017-07-29 RX ADMIN — LABETALOL HYDROCHLORIDE 200 MG: 200 TABLET, FILM COATED ORAL at 08:07

## 2017-07-29 RX ADMIN — NAPROXEN 500 MG: 500 TABLET ORAL at 09:07

## 2017-07-29 RX ADMIN — CLOSTRIDIUM TETANI TOXOID ANTIGEN (FORMALDEHYDE INACTIVATED), CORYNEBACTERIUM DIPHTHERIAE TOXOID ANTIGEN (FORMALDEHYDE INACTIVATED), BORDETELLA PERTUSSIS TOXOID ANTIGEN (GLUTARALDEHYDE INACTIVATED), BORDETELLA PERTUSSIS FILAMENTOUS HEMAGGLUTININ ANTIGEN (FORMALDEHYDE INACTIVATED), BORDETELLA PERTUSSIS PERTACTIN ANTIGEN, AND BORDETELLA PERTUSSIS FIMBRIAE 2/3 ANTIGEN 0.5 ML: 5; 2; 2.5; 5; 3; 5 INJECTION, SUSPENSION INTRAMUSCULAR at 11:07

## 2017-07-29 NOTE — PLAN OF CARE
Problem: Breastfeeding (Adult,Obstetrics,Pediatric)  Goal: Signs and Symptoms of Listed Potential Problems Will be Absent, Minimized or Managed (Breastfeeding)  Signs and symptoms of listed potential problems will be absent, minimized or managed by discharge/transition of care (reference Breastfeeding (Adult,Obstetrics,Pediatric) CPG).   Outcome: Unable to achieve outcome(s) by discharge  Mother says she will probably just pump but won't do much while in the hospital. Says she feels too uncomfortable. Offered to put a sign on her door so she wouldn't be disturbed. Says she may try to breastfeed tonight when there are fewer interruptions. Encouraged to ask for help as needed. Discussed positioning and latch and gave her tips for getting baby to take her breast. Discharge teaching done. Told to call with needs. Steam cleaned her pump parts.

## 2017-07-29 NOTE — PLAN OF CARE
Discharge instructions given. Pt verbalized understanding understanding. Pt aware that she must now take her own medications. Pt will remain in room to room in with infant.

## 2017-07-29 NOTE — PLAN OF CARE
Problem: Patient Care Overview  Goal: Plan of Care Review  Outcome: Ongoing (interventions implemented as appropriate)  Pain controlled.  Ambulating, tolerating diet, voiding, passing gas.  Blood pressure monitored.  Safety maintained.  Breast pumping encouraged, colostrum given via syringe or glove for oral care.  Parents bonding well with infant rooming in from NICU.  Sig other at bedside assisting with care.

## 2017-07-29 NOTE — PLAN OF CARE
Problem: Breastfeeding (Adult,Obstetrics,Pediatric)  Goal: Signs and Symptoms of Listed Potential Problems Will be Absent, Minimized or Managed (Breastfeeding)  Signs and symptoms of listed potential problems will be absent, minimized or managed by discharge/transition of care (reference Breastfeeding (Adult,Obstetrics,Pediatric) CPG).   Outcome: Unable to achieve outcome(s) by discharge  Mother says she plans to start pumping more when she gets home. States that she has a double electric pump. Encouraged to call for latch assistance should she want to breastfeed.

## 2017-07-29 NOTE — LACTATION NOTE
07/29/17 1300   Maternal Infant Assessment   Breast Density soft  (per pt)   Maternal Infant Feeding   Breastfeeding Education importance of skin-to-skin contact;increasing milk supply;label/storage of breast milk;milk expression, hand;milk expression, electric pump;prenatal vitamins continued;diet   Lactation Referrals   Lactation Consult Knowledge deficit;Pump teaching  (discharge teaching)   Lactation Interventions   Attachment Promotion role responsibility promoted;counseling provided   Breastfeeding Assistance support offered   Maternal Breastfeeding Support encouragement offered;infant-mother separation minimized;lactation counseling provided;maternal hydration promoted;maternal nutrition promoted;maternal rest encouraged

## 2017-07-29 NOTE — PLAN OF CARE
Problem: Patient Care Overview  Goal: Plan of Care Review  Patient states that pain being controlled by current by current pain meds. VSS. Bonding with baby. Tolerating regular diet. Patient states that pain being controlled by current by current pain meds. VSS. Bonding with baby. Tolerating regular diet.

## 2017-07-29 NOTE — DISCHARGE SUMMARY
Admit Date: 2017  Discharge Date: 2017    Attending physician: MWiedemann , MD    Diagnosis:  36 week Pregnancy  Early delivery due to PIH  Viable male  Infant      Procedure:       Repeat     Hospital Course: Afebrile and vital signs stable throughout hospital course. Routine progressive care. Vaginal bleeding stable. Tolerating oral intake. Positive flatus.    Discharged Condition: Improving    Disposition: Discharged to home or self care    Activity:  As tolerated  Pelvic rest x 6 weeks  Diet: Regular  Medications: Anaprox DS and Percocet; Labetalol  Follow up:  2  weeks

## 2017-07-29 NOTE — PROGRESS NOTES
Postpartum day #3:  Patient continues to recover. VS stable.  Uterus firm; lochia minimal to moderate.  Ready for discharge home.  Baby will remain X 24h  Circumcision to be done for baby before discharge  Instructions and precautions given.  Return to office in 2 weeks.  OK to Room-in overnight.

## 2017-07-29 NOTE — DISCHARGE INSTRUCTIONS
Breastfeeding Discharge Instructions       Feed the baby at the earliest sign of hunger or comfort  o Hands to mouth, sucking motions  o Rooting or searching for something to suck on  o Dont wait for crying - it is a sign of distress     The feedings may be 8-12 times per 24hrs and will not follow a schedule   Avoid pacifiers and bottles for the first 4 weeks   Alternate the breast you start the feeding with, or start with the breast that feels the fullest   Switch breasts when the baby takes himself off the breast or falls asleep   Keep offering breasts until the baby looks full, no longer gives hunger signs, and stays asleep when placed on his back in the crib   If the baby is sleepy and wont wake for a feeding, put the baby skin-to-skin dressed in a diaper against the mothers bare chest   Sleep near your baby   The baby should be positioned and latched on to the breast correctly  o Chest-to-chest, chin in the breast  o Babys lips are flipped outward  o Babys mouth is stretched open wide like a shout  o Babys sucking should feel like tugging to the mother  - The baby should be drinking at the breast:  o You should hear swallowing or gulping throughout the feeding  o You should see milk on the babys lips when he comes off the breast  o Your breasts should be softer when the baby is finished feeding  o The baby should look relaxed at the end of feedings  o After the 4th day and your milk is in:  o The babys poop should turn bright yellow and be loose, watery, and seedy  o The baby should have at least 3-4 poops the size of the palm of your hand per day  o The baby should have at least 5-6 wet diapers per day  o The urine should be light yellow in color  You should drink when you are thirsty and eat a healthy diet when you are    hungry.     Take naps to get the rest you need.   Take medications and/or drink alcohol only with permission of your obstetrician    or the babys pediatrician.  You can  also call the [facility],   ([phone]), Monday-Friday, 8am-5pm Central time, to get the most   up-to-date evidence-based information on the use of medications during   pregnancy and breastfeeding.      The baby should be examined by a pediatrician at 3-5 days of age.  Once your   milk comes in, the baby should be gaining at least ½ - 1oz each day and should be back to birthweight no later than 10-14 days of age.          Community Resources    [facility] Breastfeeding Warmline: 698.529.1695  Local [facility] clinics: provide incentives and breastpumps to eligible mothers  [employer] ([employer]LI):  mother-to-mother support group website        www.[employer]  Local La Leche League mother-to-mother support groups:        www.Fresh Dish        La Leche League [facility]   Dr. Calvin Haskins website for latch videos and general information:        www.breastfeedinginc.ca  [facility] is a call center that provides information about the safety of taking medications while breastfeeding.  Call 1-556.459.5894, M-F, 8am-5pm, [place].  International Lactation Consultant Association provides resources for assistance:        www.ilca.org  [facility] Breastfeeding Coalition provides informationand resources for parents  and the community    http://[facility] Healthastfeeding.org     Sarah Short is a mom-to-mom support group:                             www.nolanesting.Abril//breastfeedng-support/  Partners for Healthy Babies:  2-225-520-BABY(6004)  [employer]: a breastfeeding support group for women of color, 922.159.7414  Patient Discharge Instructions for Postpartum Women    Resume Regular Diet  Increase activity gradually, no heavy lifting  Shower  No tampons, douching or sexual intercourse.  Discuss birth control options with your physician.  Wear a support bra  Return to work/school when you've been cleared by a physician    Call your physician if     *Fever of 100.4 or higher  *Persistent  "nausea/ vomiting  *Incisional drainage  *Heavy vaginal bleeding or large clots (Heavy bleeding is soaking 1 pad in an hour)  *Swelling and pain in arms or legs  *Severe headaches, blurred vision or fainting  *Shortness of breath  *Frequency and burning with urination  *Signs of postpartum depression, discuss these signs with your physician    Call lactation services for questions regarding feeding, nipple and breast care, and general questions about lactation.  They can be reached at [phone]         Understanding Postpartum Depression    You've just had a baby.  You know you should be excited and happy.  But instead you find yourself crying for no reason.  You may have trouble coping with your daily tasks.  You feel sad, tired, and hopeless most of the time.  You may even feel ashamed or guilty.  But what you're going through is not your fault and you can feel better.  Talk to your doctor.  He or she can help.    Depression After Childbirth    You may be weepy and tired right after giving birth.  These feelings are normal.  They're sometimes called the "baby blues."  These blues go away 2-3 weeks.  However, postpartum (meaning "after birth") depression lasts much longer and is more sever than the "baby blues."  It can make you feel sad and hopeless.  You may also fear that your baby will be harmed and worry about being a bad mother.      What is Depression?    Depression is a mood disorder that affects the way you think and feel.  The most common symptom is a feeling of deep sadness.  You may also feel as if you just can't cope with life.    Other symptoms include:      * Gaining or loosing weight  * Sleeping too much or too little  * Feeling tired all the time  * Feeling restless  * Fears of harming your baby   * Lack of interest in your baby  * Feeling worthless or guilty  * No longer finding pleasure in things you used to  * Having trouble thinking clearly or making decisions  * Thoughts of hurting yourself " or your baby    What Causes Postpartum Depression    The exact causes of postpartum depression isn't known.  It may be due to changes in your hormones during and after childbirth.  You may also be tired from caring for your baby and adjusting to being a mother.  All these factors may make you feel depressed.  In some cases, your genes may also play a role.    Depression Can Be Treated    The good news is that there are many ways to treat postpartum depression.  Talking to your doctor is the first step toward feeling better.    Resources:    * National [place] of Mental Health  -- 800-124-7705    www.nimh.nih.gov    * National [place] on Mental Illness --878.613.3988    Www.jerardo.org    * Mental Health [place] -- 115.905.4299     Www.nmha.org    * National Suicide Hotline --275.713.8167 (800-SUICIDE)    6512-4637 [employer]  All rights reserved.  This information is not intended as a substitute for professional medical care.  Always follow up with your healthcare professional's instructions.

## 2017-07-31 ENCOUNTER — HOSPITAL ENCOUNTER (INPATIENT)
Facility: HOSPITAL | Age: 32
LOS: 1 days | Discharge: HOME | DRG: 776 | End: 2017-08-02
Attending: EMERGENCY MEDICINE | Admitting: EMERGENCY MEDICINE
Payer: COMMERCIAL

## 2017-07-31 DIAGNOSIS — I51.7 CARDIOMEGALY: ICD-10-CM

## 2017-07-31 DIAGNOSIS — I11.9 HYPERTENSIVE CARDIOMYOPATHY, WITHOUT HEART FAILURE: ICD-10-CM

## 2017-07-31 DIAGNOSIS — J81.0 ACUTE PULMONARY EDEMA: Primary | ICD-10-CM

## 2017-07-31 DIAGNOSIS — I43 HYPERTENSIVE CARDIOMYOPATHY, WITHOUT HEART FAILURE: ICD-10-CM

## 2017-07-31 DIAGNOSIS — O10.919 HTN IN PREGNANCY, CHRONIC: ICD-10-CM

## 2017-07-31 DIAGNOSIS — Z98.891 S/P C-SECTION: ICD-10-CM

## 2017-07-31 PROCEDURE — 96375 TX/PRO/DX INJ NEW DRUG ADDON: CPT

## 2017-07-31 PROCEDURE — 96365 THER/PROPH/DIAG IV INF INIT: CPT

## 2017-07-31 PROCEDURE — 99285 EMERGENCY DEPT VISIT HI MDM: CPT | Mod: 25

## 2017-07-31 PROCEDURE — 84484 ASSAY OF TROPONIN QUANT: CPT

## 2017-07-31 PROCEDURE — 83880 ASSAY OF NATRIURETIC PEPTIDE: CPT

## 2017-07-31 PROCEDURE — 85025 COMPLETE CBC W/AUTO DIFF WBC: CPT

## 2017-07-31 PROCEDURE — 85610 PROTHROMBIN TIME: CPT

## 2017-07-31 PROCEDURE — 81000 URINALYSIS NONAUTO W/SCOPE: CPT

## 2017-07-31 PROCEDURE — 96376 TX/PRO/DX INJ SAME DRUG ADON: CPT

## 2017-07-31 PROCEDURE — 11000001 HC ACUTE MED/SURG PRIVATE ROOM

## 2017-07-31 PROCEDURE — 93005 ELECTROCARDIOGRAM TRACING: CPT

## 2017-07-31 PROCEDURE — 80053 COMPREHEN METABOLIC PANEL: CPT

## 2017-08-01 PROBLEM — J81.0 ACUTE PULMONARY EDEMA: Status: ACTIVE | Noted: 2017-08-01

## 2017-08-01 LAB
ALBUMIN SERPL BCP-MCNC: 2.4 G/DL
ALP SERPL-CCNC: 97 U/L
ALT SERPL W/O P-5'-P-CCNC: 25 U/L
ANION GAP SERPL CALC-SCNC: 9 MMOL/L
AST SERPL-CCNC: 18 U/L
BACTERIA #/AREA URNS HPF: ABNORMAL /HPF
BASOPHILS # BLD AUTO: 0.01 K/UL
BASOPHILS NFR BLD: 0.2 %
BILIRUB SERPL-MCNC: 0.2 MG/DL
BILIRUB UR QL STRIP: NEGATIVE
BNP SERPL-MCNC: 175 PG/ML
BUN SERPL-MCNC: 14 MG/DL
CALCIUM SERPL-MCNC: 8.5 MG/DL
CHLORIDE SERPL-SCNC: 109 MMOL/L
CLARITY UR: CLEAR
CO2 SERPL-SCNC: 22 MMOL/L
COLOR UR: YELLOW
CREAT SERPL-MCNC: 0.7 MG/DL
DIFFERENTIAL METHOD: ABNORMAL
EOSINOPHIL # BLD AUTO: 0.2 K/UL
EOSINOPHIL NFR BLD: 2.4 %
ERYTHROCYTE [DISTWIDTH] IN BLOOD BY AUTOMATED COUNT: 13.1 %
EST. GFR  (AFRICAN AMERICAN): >60 ML/MIN/1.73 M^2
EST. GFR  (NON AFRICAN AMERICAN): >60 ML/MIN/1.73 M^2
GLUCOSE SERPL-MCNC: 103 MG/DL
GLUCOSE UR QL STRIP: NEGATIVE
HCT VFR BLD AUTO: 26.5 %
HGB BLD-MCNC: 8.7 G/DL
HGB UR QL STRIP: ABNORMAL
INR PPP: 0.9
KETONES UR QL STRIP: NEGATIVE
LEUKOCYTE ESTERASE UR QL STRIP: ABNORMAL
LYMPHOCYTES # BLD AUTO: 1.8 K/UL
LYMPHOCYTES NFR BLD: 28 %
MCH RBC QN AUTO: 28.8 PG
MCHC RBC AUTO-ENTMCNC: 32.8 G/DL
MCV RBC AUTO: 88 FL
MICROSCOPIC COMMENT: ABNORMAL
MONOCYTES # BLD AUTO: 0.5 K/UL
MONOCYTES NFR BLD: 7.3 %
NEUTROPHILS # BLD AUTO: 4 K/UL
NEUTROPHILS NFR BLD: 61.6 %
NITRITE UR QL STRIP: NEGATIVE
PH UR STRIP: 7 [PH] (ref 5–8)
PLATELET # BLD AUTO: 387 K/UL
PMV BLD AUTO: 8.6 FL
POTASSIUM SERPL-SCNC: 3.5 MMOL/L
PROT SERPL-MCNC: 5.9 G/DL
PROT UR QL STRIP: NEGATIVE
PROTHROMBIN TIME: 10 SEC
RBC # BLD AUTO: 3.02 M/UL
RBC #/AREA URNS HPF: >100 /HPF (ref 0–4)
SODIUM SERPL-SCNC: 140 MMOL/L
SP GR UR STRIP: <=1.005 (ref 1–1.03)
SQUAMOUS #/AREA URNS HPF: 2 /HPF
TROPONIN I SERPL DL<=0.01 NG/ML-MCNC: <0.006 NG/ML
URN SPEC COLLECT METH UR: ABNORMAL
UROBILINOGEN UR STRIP-ACNC: NEGATIVE EU/DL
WBC # BLD AUTO: 6.56 K/UL
WBC #/AREA URNS HPF: 8 /HPF (ref 0–5)

## 2017-08-01 PROCEDURE — G0378 HOSPITAL OBSERVATION PER HR: HCPCS

## 2017-08-01 PROCEDURE — 25000003 PHARM REV CODE 250: Performed by: INTERNAL MEDICINE

## 2017-08-01 PROCEDURE — 25000003 PHARM REV CODE 250: Performed by: FAMILY MEDICINE

## 2017-08-01 PROCEDURE — 99223 1ST HOSP IP/OBS HIGH 75: CPT | Mod: ,,, | Performed by: OBSTETRICS & GYNECOLOGY

## 2017-08-01 PROCEDURE — 25000003 PHARM REV CODE 250: Performed by: EMERGENCY MEDICINE

## 2017-08-01 PROCEDURE — 25500020 PHARM REV CODE 255: Performed by: EMERGENCY MEDICINE

## 2017-08-01 PROCEDURE — 63600175 PHARM REV CODE 636 W HCPCS: Performed by: EMERGENCY MEDICINE

## 2017-08-01 PROCEDURE — 63600175 PHARM REV CODE 636 W HCPCS: Performed by: INTERNAL MEDICINE

## 2017-08-01 PROCEDURE — 94761 N-INVAS EAR/PLS OXIMETRY MLT: CPT

## 2017-08-01 PROCEDURE — 93306 TTE W/DOPPLER COMPLETE: CPT

## 2017-08-01 PROCEDURE — 25000003 PHARM REV CODE 250: Performed by: OBSTETRICS & GYNECOLOGY

## 2017-08-01 PROCEDURE — 11000001 HC ACUTE MED/SURG PRIVATE ROOM

## 2017-08-01 RX ORDER — FAMOTIDINE 20 MG/1
20 TABLET, FILM COATED ORAL 2 TIMES DAILY
Status: DISCONTINUED | OUTPATIENT
Start: 2017-08-01 | End: 2017-08-02 | Stop reason: HOSPADM

## 2017-08-01 RX ORDER — HYDRALAZINE HYDROCHLORIDE 20 MG/ML
10 INJECTION INTRAMUSCULAR; INTRAVENOUS
Status: ACTIVE | OUTPATIENT
Start: 2017-08-01 | End: 2017-08-01

## 2017-08-01 RX ORDER — LABETALOL HYDROCHLORIDE 5 MG/ML
40 INJECTION, SOLUTION INTRAVENOUS ONCE
Status: CANCELLED | OUTPATIENT
Start: 2017-08-01

## 2017-08-01 RX ORDER — OXYCODONE AND ACETAMINOPHEN 5; 325 MG/1; MG/1
1 TABLET ORAL EVERY 4 HOURS PRN
Status: DISCONTINUED | OUTPATIENT
Start: 2017-08-01 | End: 2017-08-02 | Stop reason: HOSPADM

## 2017-08-01 RX ORDER — HYDRALAZINE HYDROCHLORIDE 20 MG/ML
10 INJECTION INTRAMUSCULAR; INTRAVENOUS
Status: COMPLETED | OUTPATIENT
Start: 2017-08-01 | End: 2017-08-01

## 2017-08-01 RX ORDER — HYDRALAZINE HYDROCHLORIDE 20 MG/ML
10 INJECTION INTRAMUSCULAR; INTRAVENOUS EVERY 8 HOURS PRN
Status: DISCONTINUED | OUTPATIENT
Start: 2017-08-01 | End: 2017-08-02 | Stop reason: HOSPADM

## 2017-08-01 RX ORDER — LABETALOL 100 MG/1
300 TABLET, FILM COATED ORAL
Status: COMPLETED | OUTPATIENT
Start: 2017-08-01 | End: 2017-08-01

## 2017-08-01 RX ORDER — FUROSEMIDE 10 MG/ML
20 INJECTION INTRAMUSCULAR; INTRAVENOUS
Status: COMPLETED | OUTPATIENT
Start: 2017-08-01 | End: 2017-08-01

## 2017-08-01 RX ORDER — MORPHINE SULFATE 2 MG/ML
4 INJECTION, SOLUTION INTRAMUSCULAR; INTRAVENOUS EVERY 4 HOURS PRN
Status: DISCONTINUED | OUTPATIENT
Start: 2017-08-01 | End: 2017-08-02 | Stop reason: HOSPADM

## 2017-08-01 RX ORDER — DOXYLAMINE SUCCINATE 25 MG
TABLET ORAL 2 TIMES DAILY
Status: DISCONTINUED | OUTPATIENT
Start: 2017-08-01 | End: 2017-08-02 | Stop reason: HOSPADM

## 2017-08-01 RX ORDER — LABETALOL HYDROCHLORIDE 5 MG/ML
20 INJECTION, SOLUTION INTRAVENOUS ONCE
Status: COMPLETED | OUTPATIENT
Start: 2017-08-01 | End: 2017-08-01

## 2017-08-01 RX ORDER — METOCLOPRAMIDE HYDROCHLORIDE 5 MG/ML
5 INJECTION INTRAMUSCULAR; INTRAVENOUS EVERY 6 HOURS PRN
Status: DISCONTINUED | OUTPATIENT
Start: 2017-08-01 | End: 2017-08-02 | Stop reason: HOSPADM

## 2017-08-01 RX ORDER — LABETALOL HYDROCHLORIDE 5 MG/ML
20 INJECTION, SOLUTION INTRAVENOUS EVERY 4 HOURS PRN
Status: DISCONTINUED | OUTPATIENT
Start: 2017-08-01 | End: 2017-08-02 | Stop reason: HOSPADM

## 2017-08-01 RX ORDER — NAPROXEN SODIUM 275 MG/1
550 TABLET ORAL 2 TIMES DAILY WITH MEALS
Status: DISCONTINUED | OUTPATIENT
Start: 2017-08-01 | End: 2017-08-01

## 2017-08-01 RX ORDER — ACETAMINOPHEN 500 MG
1000 TABLET ORAL EVERY 6 HOURS PRN
Status: DISCONTINUED | OUTPATIENT
Start: 2017-08-01 | End: 2017-08-02 | Stop reason: HOSPADM

## 2017-08-01 RX ORDER — DIPHENHYDRAMINE HYDROCHLORIDE 50 MG/ML
50 INJECTION INTRAMUSCULAR; INTRAVENOUS
Status: COMPLETED | OUTPATIENT
Start: 2017-08-01 | End: 2017-08-01

## 2017-08-01 RX ORDER — MORPHINE SULFATE 2 MG/ML
4 INJECTION, SOLUTION INTRAMUSCULAR; INTRAVENOUS
Status: COMPLETED | OUTPATIENT
Start: 2017-08-01 | End: 2017-08-01

## 2017-08-01 RX ORDER — KETOROLAC TROMETHAMINE 30 MG/ML
30 INJECTION, SOLUTION INTRAMUSCULAR; INTRAVENOUS
Status: COMPLETED | OUTPATIENT
Start: 2017-08-01 | End: 2017-08-01

## 2017-08-01 RX ORDER — MAGNESIUM SULFATE HEPTAHYDRATE 40 MG/ML
2 INJECTION, SOLUTION INTRAVENOUS
Status: DISCONTINUED | OUTPATIENT
Start: 2017-08-01 | End: 2017-08-01

## 2017-08-01 RX ORDER — ONDANSETRON 2 MG/ML
4 INJECTION INTRAMUSCULAR; INTRAVENOUS EVERY 12 HOURS PRN
Status: DISCONTINUED | OUTPATIENT
Start: 2017-08-01 | End: 2017-08-02 | Stop reason: HOSPADM

## 2017-08-01 RX ORDER — LABETALOL 300 MG/1
300 TABLET, FILM COATED ORAL ONCE
Status: DISCONTINUED | OUTPATIENT
Start: 2017-08-01 | End: 2017-08-01

## 2017-08-01 RX ORDER — HYDRALAZINE HYDROCHLORIDE 20 MG/ML
20 INJECTION INTRAMUSCULAR; INTRAVENOUS
Status: COMPLETED | OUTPATIENT
Start: 2017-08-01 | End: 2017-08-01

## 2017-08-01 RX ORDER — LORAZEPAM 1 MG/1
1 TABLET ORAL
Status: COMPLETED | OUTPATIENT
Start: 2017-08-01 | End: 2017-08-01

## 2017-08-01 RX ORDER — LABETALOL HYDROCHLORIDE 5 MG/ML
40 INJECTION, SOLUTION INTRAVENOUS ONCE
Status: DISCONTINUED | OUTPATIENT
Start: 2017-08-01 | End: 2017-08-01

## 2017-08-01 RX ORDER — HYDRALAZINE HYDROCHLORIDE 20 MG/ML
15 INJECTION INTRAMUSCULAR; INTRAVENOUS
Status: COMPLETED | OUTPATIENT
Start: 2017-08-01 | End: 2017-08-01

## 2017-08-01 RX ORDER — ENOXAPARIN SODIUM 100 MG/ML
40 INJECTION SUBCUTANEOUS EVERY 24 HOURS
Status: DISCONTINUED | OUTPATIENT
Start: 2017-08-01 | End: 2017-08-01

## 2017-08-01 RX ORDER — BUTALBITAL, ACETAMINOPHEN AND CAFFEINE 50; 325; 40 MG/1; MG/1; MG/1
2 TABLET ORAL EVERY 4 HOURS PRN
Status: DISCONTINUED | OUTPATIENT
Start: 2017-08-01 | End: 2017-08-02 | Stop reason: HOSPADM

## 2017-08-01 RX ORDER — DIPHENHYDRAMINE HYDROCHLORIDE 50 MG/ML
INJECTION INTRAMUSCULAR; INTRAVENOUS
Status: COMPLETED
Start: 2017-08-01 | End: 2017-08-01

## 2017-08-01 RX ORDER — KETOROLAC TROMETHAMINE 30 MG/ML
15 INJECTION, SOLUTION INTRAMUSCULAR; INTRAVENOUS ONCE
Status: DISCONTINUED | OUTPATIENT
Start: 2017-08-01 | End: 2017-08-01

## 2017-08-01 RX ORDER — LABETALOL 100 MG/1
300 TABLET, FILM COATED ORAL 2 TIMES DAILY
Status: DISCONTINUED | OUTPATIENT
Start: 2017-08-01 | End: 2017-08-02 | Stop reason: HOSPADM

## 2017-08-01 RX ORDER — LABETALOL HYDROCHLORIDE 5 MG/ML
10 INJECTION, SOLUTION INTRAVENOUS ONCE
Status: COMPLETED | OUTPATIENT
Start: 2017-08-01 | End: 2017-08-01

## 2017-08-01 RX ORDER — LORAZEPAM 0.5 MG/1
1 TABLET ORAL EVERY 4 HOURS PRN
Status: DISCONTINUED | OUTPATIENT
Start: 2017-08-01 | End: 2017-08-02 | Stop reason: HOSPADM

## 2017-08-01 RX ADMIN — ACETAMINOPHEN 1000 MG: 500 TABLET ORAL at 08:08

## 2017-08-01 RX ADMIN — LABETALOL HCL 300 MG: 100 TABLET, FILM COATED ORAL at 08:08

## 2017-08-01 RX ADMIN — LORAZEPAM 1 MG: 1 TABLET ORAL at 01:08

## 2017-08-01 RX ADMIN — MICONAZOLE NITRATE: 20 CREAM TOPICAL at 02:08

## 2017-08-01 RX ADMIN — MORPHINE SULFATE 4 MG: 2 INJECTION, SOLUTION INTRAMUSCULAR; INTRAVENOUS at 05:08

## 2017-08-01 RX ADMIN — MAGNESIUM SULFATE IN WATER 2 G: 40 INJECTION, SOLUTION INTRAVENOUS at 12:08

## 2017-08-01 RX ADMIN — BUTALBITAL, ACETAMINOPHEN, AND CAFFEINE 2 TABLET: 50; 325; 40 TABLET ORAL at 08:08

## 2017-08-01 RX ADMIN — BUTALBITAL, ACETAMINOPHEN, AND CAFFEINE 2 TABLET: 50; 325; 40 TABLET ORAL at 01:08

## 2017-08-01 RX ADMIN — FAMOTIDINE 20 MG: 20 TABLET, FILM COATED ORAL at 08:08

## 2017-08-01 RX ADMIN — LABETALOL HCL 300 MG: 100 TABLET, FILM COATED ORAL at 02:08

## 2017-08-01 RX ADMIN — FUROSEMIDE 20 MG: 10 INJECTION, SOLUTION INTRAMUSCULAR; INTRAVENOUS at 01:08

## 2017-08-01 RX ADMIN — DIPHENHYDRAMINE HYDROCHLORIDE 50 MG: 50 INJECTION, SOLUTION INTRAMUSCULAR; INTRAVENOUS at 01:08

## 2017-08-01 RX ADMIN — MICONAZOLE NITRATE: 20 CREAM TOPICAL at 09:08

## 2017-08-01 RX ADMIN — HYDRALAZINE HYDROCHLORIDE 15 MG: 20 INJECTION INTRAMUSCULAR; INTRAVENOUS at 01:08

## 2017-08-01 RX ADMIN — MORPHINE SULFATE 4 MG: 2 INJECTION, SOLUTION INTRAMUSCULAR; INTRAVENOUS at 02:08

## 2017-08-01 RX ADMIN — LABETALOL HCL 300 MG: 100 TABLET, FILM COATED ORAL at 09:08

## 2017-08-01 RX ADMIN — FAMOTIDINE 20 MG: 20 TABLET, FILM COATED ORAL at 09:08

## 2017-08-01 RX ADMIN — KETOROLAC TROMETHAMINE 30 MG: 30 INJECTION, SOLUTION INTRAMUSCULAR; INTRAVENOUS at 01:08

## 2017-08-01 RX ADMIN — LABETALOL HYDROCHLORIDE 20 MG: 5 INJECTION INTRAVENOUS at 01:08

## 2017-08-01 RX ADMIN — LABETALOL HYDROCHLORIDE 10 MG: 5 INJECTION, SOLUTION INTRAVENOUS at 02:08

## 2017-08-01 RX ADMIN — IOHEXOL 100 ML: 350 INJECTION, SOLUTION INTRAVENOUS at 12:08

## 2017-08-01 RX ADMIN — HYDRALAZINE HYDROCHLORIDE 10 MG: 20 INJECTION INTRAMUSCULAR; INTRAVENOUS at 12:08

## 2017-08-01 RX ADMIN — HYDRALAZINE HYDROCHLORIDE 20 MG: 20 INJECTION INTRAMUSCULAR; INTRAVENOUS at 03:08

## 2017-08-01 NOTE — PLAN OF CARE
Problem: Breastfeeding (Adult,Obstetrics,Pediatric)  Goal: Signs and Symptoms of Listed Potential Problems Will be Absent, Minimized or Managed (Breastfeeding)  Signs and symptoms of listed potential problems will be absent, minimized or managed by discharge/transition of care (reference Breastfeeding (Adult,Obstetrics,Pediatric) CPG).  Outcome: Ongoing (interventions implemented as appropriate)  Mom will pump 8 or more times in 24hrs. with breast massage before, during and after pumping.  She will hand express after each pumping.  She will label, store and transport EBM safely.  She will clean collection kit as instructed.  She will monitor for adequate milk supply.  She will call Lactation Center for any needs or concerns.

## 2017-08-01 NOTE — PLAN OF CARE
0520-Dr Campos notified of admit b/p 182/84. Pt c/o headache. New orders received.      0547- PRN b/p med held for B/P 141/65.

## 2017-08-01 NOTE — ED TRIAGE NOTES
Patient presents to the ED with reports of having shortness of breath with back pain and reports having swelling to the leg and body. Patient recently had  x 1 week ago and reports having worse SOB on ambulation. Patient also reports having a rash to the groin area.     Review of patient's allergies indicates:  No Known Allergies     Patient has verified the spelling of their name and  on armband.   APPEARANCE: Patient is alert, oriented x 4, and is restless/anxious in appearance.  SKIN: Skin is normal for race, warm, and dry. Normal skin turgor and mucous membranes moist. Reports having a rash to the groin area   CARDIAC: Normal rate and rhythm, no murmur heard. +HTN  RESPIRATORY:Normal rate and effort. Breath sounds clear bilaterally throughout chest. Respirations are equal and unlabored. +shortness of breath.    GASTRO: Bowel sounds normal, abdomen is soft, no tenderness, and no abdominal distention.  MUSCLE: Full ROM. No bony tenderness or soft tissue tenderness. No obvious deformity. +Back pain.  PERIPHERAL VASCULAR: peripheral pulses present. Normal cap refill. Warm to touch. +Edema noted. No pitting noted. +Weight gain. Reports swelling radiating up both legs and states feeling swollen to the arm and abdomen.   MENTAL STATUS: awake, alert and aware of environment.  ENT: EARS: no obvious drainage. NOSE: no active bleeding. THROAT: no redness or swelling.  : +Dysuria. States started after the removal of reed catheter during previous admission.

## 2017-08-01 NOTE — PLAN OF CARE
0645 rec report from JOSE Sanderson assumed care    0745 head to toe assessment completed, history rev, pt complains of bad headache 6/10, sl in place, pt denies blured vision and abd pain.  Explained plan of care, call light in reach, side rails up x2.    0800 phoned dr chen for tylenol order due to pts headache    0930 report given to dr april md ordered a consult from internal medicine    1008 report given to Dr. Johnny Shay with internal medicine    1125 internal medicine team at the bedside    1237 dr chen at the desk, discussed pts meds, md states to dc all nsaids until further notice and to dc Lovenox.     1300 paged internal medicine for report, awaiting call    1330 paged internal medicine for report, awaiting call    1400 paged dr lucas, notified him of ot bp 174/103, orders rec to give labetalol once 10mg iv.    1440 echo at the bedside

## 2017-08-01 NOTE — ED NOTES
Patient ambulatory to the restroom. On return patient reports having increased redness and restlessness. Dr. Osuna has been informed.

## 2017-08-01 NOTE — H&P
History & Physical  OB/GYN  SUBJECTIVE:   Chief Complaint: SOB    History of Present Illness:  Patient is a 32 y.o. female, w/ h/o HTN and T2DM, who presents to [facility] for SOB. Patient states the SOB has been progressing since patient had a LTCS. Pertinent positives are b/l LE swelling and distended abdomen. Patients pregnancy was complicated by gestational HTN. Patient states she went to a PCP and was dx with HTN at that time but never was given medicine. She states she checks her BP regular and it was wnl. Her BP started increasing at 28w of her pregnancy and she was dx'd with gestational HTN at that time. She was promptly started on labetalol 200mg and increased steadily to 200mg BID. Patient denies cp, n/v/d, f/c, dizziness. Patient also complains about a rash in her b/l inguinal folds.    Review of patient's allergies indicates:  No Known Allergies    Past Medical History:   Diagnosis Date    Coronary artery disease     large heart noted on CXR , No problems    Diabetes mellitus     previous and current pregnancy, diet controlled.    Hypertension     preEclampsia     Past Surgical History:   Procedure Laterality Date     SECTION      TONSILLECTOMY       Family History   Problem Relation Age of Onset    Diabetes Mother     Diabetes Father     Diabetes Maternal Grandmother      Social History   Substance Use Topics    Smoking status: Former Smoker    Smokeless tobacco: Never Used    Alcohol use No      No current facility-administered medications on file prior to encounter.      Current Outpatient Prescriptions on File Prior to Encounter   Medication Sig    labetalol (NORMODYNE) 200 MG tablet Take 200 mg by mouth 2 (two) times daily.    naproxen (NAPROSYN) 500 MG tablet Take 1 tablet (500 mg total) by mouth 2 (two) times daily with meals.    naproxen sodium (ANAPROX DS) 550 MG tablet Take 1 tablet (550 mg total) by mouth 2 (two) times daily with meals.    oxycodone-acetaminophen  (PERCOCET) 5-325 mg per tablet Take 1 tablet by mouth every 4 (four) hours as needed for Pain.    oxycodone-acetaminophen (PERCOCET) 5-325 mg per tablet Take 1 tablet by mouth every 6 (six) hours as needed.       Review of Systems   Constitutional: Negative for chills and fever.   Respiratory: Positive for shortness of breath. Negative for wheezing.    Cardiovascular: Negative for chest pain, palpitations and leg swelling.   Gastrointestinal: Negative for abdominal pain, constipation, diarrhea, nausea and vomiting.   Genitourinary: Negative for dysuria.   Neurological: Negative for dizziness and headaches.       OBJECTIVE:     Vital Signs (Most Recent)  Temp: 98.5 °F (36.9 °C) (08/01/17 0800)  Pulse: 79 (08/01/17 0800)  Resp: 18 (08/01/17 0800)  BP: (!) 146/81 (nurse Pau was notified of B/P) (08/01/17 0800)  SpO2: 98 % (08/01/17 0900)    Vital Signs Range (Last 24H):  Temp:  [97.5 °F (36.4 °C)-98.5 °F (36.9 °C)]   Pulse:  [60-92]   Resp:  [16-20]   BP: (141-212)/()   SpO2:  [96 %-99 %]     Body mass index is 46.1 kg/m².    I & O (Last 24H):No intake or output data in the 24 hours ending 08/01/17 1140  Wt Readings from Last 3 Encounters:   07/31/17 125.6 kg (277 lb)   07/26/17 125.6 kg (277 lb)   07/26/17 127.2 kg (280 lb 6.8 oz)       Current Diet Order   Procedures    Diet Adult Regular        Physical Exam   Constitutional: She is oriented to person, place, and time. No distress.   HENT:   Head: Normocephalic and atraumatic.   Eyes: Conjunctivae are normal.   Neck: No JVD present.   Cardiovascular: Normal rate, regular rhythm and normal heart sounds.  Exam reveals no gallop and no friction rub.    No murmur heard.  Pulmonary/Chest: Effort normal and breath sounds normal. No respiratory distress. She has no wheezes.   Abdominal: Soft. Bowel sounds are normal. There is no tenderness.   Musculoskeletal: She exhibits edema (2+ pitting edema b/l up to knee).   Neurological: She is alert and oriented to  person, place, and time.   Skin: Rash (fungal rash under belly fold. ) noted.     Laboratory:  LABS  CBC    Recent Labs  Lab 07/26/17  1633 07/27/17  1150 07/31/17  2331   WBC 8.08 10.15 6.56   RBC 3.78* 3.26* 3.02*   HGB 10.8* 9.5* 8.7*   HCT 32.2* 27.8* 26.5*   * 284 387*   MCV 85 85 88   MCH 28.6 29.1 28.8   MCHC 33.5 34.2 32.8     BMP    Recent Labs  Lab 07/26/17  1819 07/31/17  2331    140   K 3.3* 3.5    109   CO2 22* 22*   BUN 5* 14   CREATININE 0.7 0.7   GLU 83 103       Recent Labs  Lab 07/26/17 1819 07/31/17  2331   CALCIUM 9.0 8.5*     LFT    Recent Labs  Lab 07/26/17 1819 07/31/17  2331   PROT 7.2 5.9*   ALBUMIN 2.6* 2.4*   BILITOT 0.2 0.2   AST 16 18   ALKPHOS 147* 97   ALT 16 25       COAGS    Recent Labs  Lab 07/31/17  2331   INR 0.9     CE    Recent Labs  Lab 07/31/17  2331   TROPONINI <0.006     BNP    Recent Labs  Lab 07/31/17  2331   *     UA    Recent Labs  Lab 07/31/17  2353   COLORU Yellow   SPECGRAV <=1.005*   PHUR 7.0   PROTEINUA Negative   BACTERIA Few*     CTA Chest Non-Coronary (PE Study) [[id number]] Resulted: 08/01/17 0035   Order Status: Completed Updated: 08/01/17 0035   Narrative:     Chest CT with contrast.    Comparison: Chest radiograph 2/4/14    Technique: 1.25 mm axial images of the chest were obtained after the administration of 100 cc of Omnipaque 350 IV contrast.  This is a PE protocol.    Results:Timing of contrast bolus is adequate. No convincing evidence of PE or pulmonary infarction.    There is a normal 3 vessel left sided arch.  No evidence for aneurysm or dissection.  The heart is enlarged without significant pericardial fluid.  No evidence for cardiac thrombus.  The pulmonary trunk and bilateral main pulmonary arteries are normal in size.    The esophagus appears normal in caliber and contour along its course.  Mild enlargement hilar lymph node measuring up to 11 mm in maximum short axis. No mediastinal, axillary or left hilar  lymphadenopathy. Triangular-shaped soft tissue density within the anterior mediastinum consistent with residual thymus.    The trachea and major bronchi are patent. No pneumothorax. There are small layering bilateral pleural effusions. There is mild bilateral nonspecific peribronchial thickening greatest at the lung bases, right greater than left. Scattered linear opacities consistent with subsegmental scarring versus atelectasis. No large consolidation. Patchy groundglass opacities within the right greater than left lower lobes concerning for superimposed confluent pulmonary edema versus aspiration versus pneumonia.    The structures at the base of the neck are without significant abnormality.  The extrathoracic soft tissues and the images of the upper abdomen are without significant abnormality.    The osseous structures show minimal degenerative change without acute or destructive process.   Impression:         1. No evidence of PE or pulmonary infarction.    2. Cardiomegaly with findings suggesting mild pulmonary edema/CHF pattern with small layering bilateral pleural effusions.    3. Bibasilar patchy groundglass opacities concerning for superimposed confluent pulmonary edema versus aspiration or pneumonia. Clinical correlation advised. Followup as warranted.    4. Mildly enlarged right hilar lymph node, nonspecific but may be reactive.      Electronically signed by: MOMO GROVE MD, MD  Date: 08/01/17  Time: 00:35    X-Ray Chest AP Portable [[id number]] Resulted: 08/01/17 0029   Order Status: Completed Updated: 08/01/17 0030   Narrative:     COMPARISON: Chest radiograph 2/4/14    FINDINGS: AP portable  view of the chest. Large body habitus. Cardiac silhouette is enlarged with mild diffuse interstitial prominence and streaky perihilar opacity suggesting pulmonary edema/CHF pattern. There are small bilateral pleural effusions. Right basilar patchy alveolar opacities concerning for aspiration versus pneumonia. No  large pneumothorax. Mediastinal contours are otherwise within normal limits. No acute osseous process seen.   PA and lateral views can be obtained.   Impression:         Findings suggesting pulmonary edema/CHF pattern with small pleural effusions, and probable right basilar airspace disease.      Electronically signed by: MOMO GROVE MD, MD  Date: 08/01/17  Time: 00:29           ASSESSMENT/PLAN:   Corrine Hicks is a 32 y.o. female who  has a past medical history of Coronary artery disease; Diabetes mellitus; and Hypertension. presents with Pulmonary edema post-papartum.     Pulmonary Edema:  - complained about progressing SOB  - BNP was 175.  - CXR shows pulmonary edema/ CHF pattern  - CTA shows similar results and r/o PE  - concerning for postpartum cardiomyopathy  - will consult medicine  - echo ordered  - continue diuretics. May benefit to switching to IV lasix.     Chronic HTN? superimposed with gestational HTN  - postpartum 6 days  - was not on chronic HTN meds until about 28w when her BP was found to be elevated  - was started on labetalol and increased steadily to 200mg BID  - BP on admit was 198/110  - received multiple IV hydralazine and labetalol during admission so far.  - for now will continue labetalol 300mg BID  - will add PRN BP meds for now.  - consulted medicine for further recs.    Fungal rash:  - will start topical ketoconazole     PPx:  lovenox    Dispo: f/u medicine recs.     Ruben Ramon MD  [facility] PGY 3  8/1/2017 11:41 AM

## 2017-08-01 NOTE — ED PROVIDER NOTES
Encounter Date: 2017       History     Chief Complaint   Patient presents with    Shortness of Breath     Patient presents to the ED with reports of having shortness of breath with back pain and reports having swelling to the leg and body. Patient recently had  x 1 week ago and reports having worse SOB on ambulation    Swelling    Rash     Patient reports having a rash to the lower abdomen.      32-year-old female who is 5 days postpartum from  at 36 weeks for preeclampsia, comes into the emergency department with increasing shortness of breath, back pain, as well as diffuse swelling of bilateral legs and abdomen.  She has gained 5 pounds since she was discharged.  She feels short of breath on conversation and worse with ambulation.  She now has an itching rash in her bilateral groins.  She denies chest pain, but does have a mid back pain.    She takes labetalol 200 mg twice daily for preeclampsia.  She took her blood pressure prior to arrival and it was elevated in the 170s, and took 200 mg of labetalol prior to arrival.  She denies headaches or vision changes.  She has had persistent vaginal bleeding, however she is not soaking pads.      The history is provided by the patient.     Review of patient's allergies indicates:  No Known Allergies  Past Medical History:   Diagnosis Date    Coronary artery disease     large heart noted on CXR , No problems    Diabetes mellitus     previous and current pregnancy, diet controlled.    Hypertension     preEclampsia     Past Surgical History:   Procedure Laterality Date     SECTION      TONSILLECTOMY       Family History   Problem Relation Age of Onset    Diabetes Mother     Diabetes Father     Diabetes Maternal Grandmother      Social History   Substance Use Topics    Smoking status: Former Smoker    Smokeless tobacco: Never Used    Alcohol use No     Review of Systems   Eyes: Negative.    Endocrine: Negative.     Genitourinary: Negative.    All other systems reviewed and are negative.      Physical Exam     Initial Vitals [07/31/17 2317]   BP Pulse Resp Temp SpO2   (!) 166/93 78 20 -- 96 %      MAP       117.33         Physical Exam    Nursing note and vitals reviewed.  Constitutional: She appears well-developed and well-nourished. She appears distressed.   HENT:   Head: Normocephalic and atraumatic.   Eyes: EOM are normal. Pupils are equal, round, and reactive to light.   Neck: Normal range of motion. Neck supple.   Cardiovascular: Normal rate and regular rhythm.   She is hypertensive with    Pulmonary/Chest: No respiratory distress.   Her lungs are clear, no crackles or rales  Her O2 sat is 96 on room air   Abdominal: Soft.   Musculoskeletal: Normal range of motion.   Neurological: She is alert and oriented to person, place, and time. She has normal strength. No cranial nerve deficit.   Skin: Skin is warm and dry.   She has bilateral lower extremity edema, non pitting  She has erythematous yeast infection in right intertriginous groin   Psychiatric: She has a normal mood and affect. Her behavior is normal. Thought content normal.         ED Course   Procedures  Labs Reviewed   CBC W/ AUTO DIFFERENTIAL - Abnormal; Notable for the following:        Result Value    RBC 3.02 (*)     Hemoglobin 8.7 (*)     Hematocrit 26.5 (*)     Platelets 387 (*)     MPV 8.6 (*)     All other components within normal limits   COMPREHENSIVE METABOLIC PANEL - Abnormal; Notable for the following:     CO2 22 (*)     Calcium 8.5 (*)     Total Protein 5.9 (*)     Albumin 2.4 (*)     All other components within normal limits   B-TYPE NATRIURETIC PEPTIDE - Abnormal; Notable for the following:      (*)     All other components within normal limits   URINALYSIS - Abnormal; Notable for the following:     Specific Gravity, UA <=1.005 (*)     Occult Blood UA 3+ (*)     Leukocytes, UA Trace (*)     All other components within normal limits    URINALYSIS MICROSCOPIC - Abnormal; Notable for the following:     RBC, UA >100 (*)     WBC, UA 8 (*)     Bacteria, UA Few (*)     All other components within normal limits   TROPONIN I   PROTIME-INR     EKG Readings: (Independently Interpreted)   NSR rate of 70, normal intervals and axis          Medical Decision Making:   Initial Assessment:   32-year-old female who is 5 days post partum with SOB, dyspnea and increasing swelling  Differential Diagnosis:   Differential to include pulmonary embolism versus preeclampsia versus anemia versus pulmonary edema  Clinical Tests:   Lab Tests: Ordered and Reviewed  Radiological Study: Ordered and Reviewed  ED Management:  UA with blood, not convincing for UTI  No protein in urine  CMP WNL with normal renal function  Baseline anemia s/p c/section- no transfusion needed    Hypertensive with mild CHF and elevate BNP  Given lasix, antihypertensives and anxiolytics  Admission to obs for BP control under Dr Campos                     ED Course     Clinical Impression:   The primary encounter diagnosis was Acute pulmonary edema. Diagnoses of S/P  and Hypertensive cardiomyopathy, without heart failure were also pertinent to this visit.                           Nahed Osuna MD  [id number]

## 2017-08-01 NOTE — ED NOTES
Reduced redness noted to the arms, chest, and face. Patient slightly restless at this time. Respirations even and non labored. Will continue to monitor.

## 2017-08-02 VITALS
RESPIRATION RATE: 18 BRPM | TEMPERATURE: 98 F | BODY MASS INDEX: 46.15 KG/M2 | OXYGEN SATURATION: 97 % | HEIGHT: 65 IN | WEIGHT: 277 LBS | DIASTOLIC BLOOD PRESSURE: 77 MMHG | HEART RATE: 65 BPM | SYSTOLIC BLOOD PRESSURE: 167 MMHG

## 2017-08-02 LAB
DIASTOLIC DYSFUNCTION: NO
ESTIMATED PA SYSTOLIC PRESSURE: 19.65
RETIRED EF AND QEF - SEE NOTES: 55 (ref 55–65)

## 2017-08-02 PROCEDURE — 94761 N-INVAS EAR/PLS OXIMETRY MLT: CPT

## 2017-08-02 PROCEDURE — 63600175 PHARM REV CODE 636 W HCPCS: Performed by: FAMILY MEDICINE

## 2017-08-02 PROCEDURE — 11000001 HC ACUTE MED/SURG PRIVATE ROOM

## 2017-08-02 PROCEDURE — 25000003 PHARM REV CODE 250: Performed by: EMERGENCY MEDICINE

## 2017-08-02 PROCEDURE — 25000003 PHARM REV CODE 250: Performed by: INTERNAL MEDICINE

## 2017-08-02 PROCEDURE — 25000003 PHARM REV CODE 250: Performed by: OBSTETRICS & GYNECOLOGY

## 2017-08-02 RX ORDER — LISINOPRIL AND HYDROCHLOROTHIAZIDE 20; 25 MG/1; MG/1
1 TABLET ORAL DAILY
Qty: 90 TABLET | Refills: 3 | Status: SHIPPED | OUTPATIENT
Start: 2017-08-02 | End: 2018-11-01

## 2017-08-02 RX ORDER — NIFEDIPINE 10 MG/1
10 CAPSULE ORAL EVERY 8 HOURS
Status: DISCONTINUED | OUTPATIENT
Start: 2017-08-02 | End: 2017-08-02

## 2017-08-02 RX ORDER — HYDROCHLOROTHIAZIDE 12.5 MG/1
12.5 TABLET ORAL DAILY
Status: DISCONTINUED | OUTPATIENT
Start: 2017-08-02 | End: 2017-08-02 | Stop reason: HOSPADM

## 2017-08-02 RX ORDER — LISINOPRIL 10 MG/1
10 TABLET ORAL DAILY
Status: DISCONTINUED | OUTPATIENT
Start: 2017-08-02 | End: 2017-08-02 | Stop reason: HOSPADM

## 2017-08-02 RX ORDER — LABETALOL 300 MG/1
300 TABLET, FILM COATED ORAL 2 TIMES DAILY
Qty: 60 TABLET | Refills: 12 | Status: SHIPPED | OUTPATIENT
Start: 2017-08-02 | End: 2017-10-02

## 2017-08-02 RX ADMIN — LORAZEPAM 1 MG: 0.5 TABLET ORAL at 04:08

## 2017-08-02 RX ADMIN — LORAZEPAM 1 MG: 0.5 TABLET ORAL at 08:08

## 2017-08-02 RX ADMIN — LABETALOL HCL 300 MG: 100 TABLET, FILM COATED ORAL at 08:08

## 2017-08-02 RX ADMIN — HYDROCHLOROTHIAZIDE 12.5 MG: 12.5 TABLET ORAL at 02:08

## 2017-08-02 RX ADMIN — LISINOPRIL 10 MG: 10 TABLET ORAL at 02:08

## 2017-08-02 RX ADMIN — FAMOTIDINE 20 MG: 20 TABLET, FILM COATED ORAL at 08:08

## 2017-08-02 RX ADMIN — LABETALOL HYDROCHLORIDE 20 MG: 5 INJECTION, SOLUTION INTRAVENOUS at 04:08

## 2017-08-02 NOTE — PLAN OF CARE
At patient bedside. Dr. Nuno at bedside. Plan of care discusses with pt. Pt upset that echo results not ready at this time. Pt bp remains elevated at this time. Cardiology to be consulted. 1030 Dr. Jimenez, [facility] in with pt. Time allowed for questions.

## 2017-08-02 NOTE — PLAN OF CARE
Dr. Campbell at bedside. Assessment being done. 1750 Dr. Nuno in with pt.   1820 Pt removed own iv. No bleeding noted.  1830 Discharge papers given. Pt states she is walking out and driving herself home. Pt aware of protocol.

## 2017-08-02 NOTE — PROGRESS NOTES
"HD #2    S: patient upset this am. Wants to see her baby.  Wants to go home. SOB improved.  Min pain. Voiding.    BP (!) 154/99 (BP Location: Left arm, Patient Position: Sitting, BP Method: Automatic)   Pulse (!) 59   Temp 98.3 °F (36.8 °C) (Oral)   Resp 18   Ht 5' 5" (1.651 m)   Wt 125.6 kg (277 lb)   LMP 11/16/2016 (Exact Date)   SpO2 96%   Breastfeeding? Yes   BMI 46.10 kg/m²   Temp:  [97.8 °F (36.6 °C)-98.3 °F (36.8 °C)] 98.3 °F (36.8 °C)  Pulse:  [59-77] 59  Resp:  [18] 18  SpO2:  [96 %-100 %] 96 %  BP: (109-222)/() 154/99   Gen: A&Ox3, NAD  Chest: RRR no m/r/g  Lungs: clear to auscutation with good air movement  Abd: obese soft, incision c/d/i, skin near incision with erythema  Ext: no more edema    Lab Results   Component Value Date    WBC 6.56 07/31/2017    HGB 8.7 (L) 07/31/2017    HCT 26.5 (L) 07/31/2017    MCV 88 07/31/2017     (H) 07/31/2017     B POS    CMP  Sodium   Date Value Ref Range Status   07/31/2017 140 136 - 145 mmol/L Final     Potassium   Date Value Ref Range Status   07/31/2017 3.5 3.5 - 5.1 mmol/L Final     Chloride   Date Value Ref Range Status   07/31/2017 109 95 - 110 mmol/L Final     CO2   Date Value Ref Range Status   07/31/2017 22 (L) 23 - 29 mmol/L Final     Glucose   Date Value Ref Range Status   07/31/2017 103 70 - 110 mg/dL Final     BUN, Bld   Date Value Ref Range Status   07/31/2017 14 6 - 20 mg/dL Final     Creatinine   Date Value Ref Range Status   07/31/2017 0.7 0.5 - 1.4 mg/dL Final     Calcium   Date Value Ref Range Status   07/31/2017 8.5 (L) 8.7 - 10.5 mg/dL Final     Total Protein   Date Value Ref Range Status   07/31/2017 5.9 (L) 6.0 - 8.4 g/dL Final     Albumin   Date Value Ref Range Status   07/31/2017 2.4 (L) 3.5 - 5.2 g/dL Final     Total Bilirubin   Date Value Ref Range Status   07/31/2017 0.2 0.1 - 1.0 mg/dL Final     Comment:     For infants and newborns, interpretation of results should be based  on gestational age, weight and in agreement " with clinical  observations.  Premature Infant recommended reference ranges:  Up to 24 hours.............<8.0 mg/dL  Up to 48 hours............<12.0 mg/dL  3-5 days..................<15.0 mg/dL  6-29 days.................<15.0 mg/dL       Alkaline Phosphatase   Date Value Ref Range Status   2017 97 55 - 135 U/L Final     AST   Date Value Ref Range Status   2017 18 10 - 40 U/L Final     ALT   Date Value Ref Range Status   2017 25 10 - 44 U/L Final     Anion Gap   Date Value Ref Range Status   2017 9 8 - 16 mmol/L Final     eGFR if    Date Value Ref Range Status   2017 >60 >60 mL/min/1.73 m^2 Final     eGFR if non    Date Value Ref Range Status   2017 >60 >60 mL/min/1.73 m^2 Final     Comment:     Calculation used to obtain the estimated glomerular filtration  rate (eGFR) is the CKD-EPI equation. Since race is unknown   in our information system, the eGFR values for   -American and Non--American patients are given   for each creatinine result.         AP: 32 you now  female s/p rLTCS on 17 for cHTN with superimposed pre-X readmitted for SOB and possible cardiomyopathy.    1) Pulmonary: SOB, improved  -lungs CTA with good air movement today  -possible CHF, cardiomyopathy  -s/p Lasix in ED on admission  -CXR reviewed    2) Cardio  -s/p ECHO on 17: waiting for cardiology consult and recs    3) cHTN with superimposed pre-X  -BPs continue to be in the severe range  -continue labetalol 300mg PO BID for now with IV labetalol as needed  -patient refusing hydralazine    3) GDM: resolved        ELLIOTT Nuno MD

## 2017-08-02 NOTE — PROGRESS NOTES
"Patient insists on going home today because she wants to see her babies and be at home.   BPs continue to be in mild to severe range. But, she reports no more shortness of breath. No HA. No dizziness. Patient reports "that I"m asymptomatic". Wants to go home.    Patient s/p consult with cardiology consult and hospital medicine consult. Appreciate recs.    Patient will be seen by hospitals internal medicine MD Kae Fernandes this Friday, Aug 4 for BP check.    Will discharge home today.    Patient given strict pre-X precautions.    tiki gresham MD  "

## 2017-08-02 NOTE — CONSULTS
Consult was called into the [facility] Medicine service who wrote orders for the patient. Will defer consult to that service.

## 2017-08-02 NOTE — DISCHARGE INSTRUCTIONS
1) Please return to hospital for evaluation for persistent severe range blood pressures (SBP greater than 160 and or DBP greater than 110).  2) Please return to hospital if persistent headache not improved with pain meds, seizures, shortness of breath, fever/chills, heavy vaginal bleeding

## 2017-08-02 NOTE — CONSULTS
Internal Medicine Consult Note    Reason for consult: HTN, refractory, and fluid overload      HPI: 32  with history of gestational diabetes and gestational hypertension who delivered at 36 weeks but has remained hypertensive in the postpartum period. Patient also developed lower extremity and upper extremity swelling and associated SOB. She has been taking labetalol 200bid since the end of her pregnancy and still has been having high HP. She denies headaches, focal numbness or weakness, chest pain. She denies fevers or chills. Her workup during this admission has been negative for PE and CXR shows findings suggestive of pulmonary edema.      PMH: preeclampsia, GDM    PSH: C-secttion, tonsillectomy     FMH: DMII      ROS: pertinent included in HPI, otherwise negative.     Vitals:    17 1300   BP: (!) 142/69   Pulse: 70   Resp: 18   Temp:      Physical exam:    Gen: NAD, Alert and oriented x 3  HEENT: normocephalic, atraumatic, PERRL  Resp: CTAB, no wheezes, no rales  CV: normal rate, normal rhythm, 2+ pulses  Abdo: soft, normoactive bowel sounds. Non-tender, non-distended  MSK: bilateral edema to mid shin.   Neuro: normal tone, 5/5 strength bilateral upper and lower extremities       Labs:     BNP:175, otherwise wnl    Imaging:     CXR: Findings suggesting pulmonary edema/CHF pattern with small pleural effusions, and probable right basilar airspace disease.  CTA: 1. No evidence of PE or pulmonary infarction.    2. Cardiomegaly with findings suggesting mild pulmonary edema/CHF pattern with small layering bilateral pleural effusions.    3. Bibasilar patchy groundglass opacities concerning for superimposed confluent pulmonary edema versus aspiration or pneumonia. Clinical correlation advised. Followup as warranted.    4. Mildly enlarged right hilar lymph node, nonspecific but may be reactive.      A/P    We suspect flash pulmonary edema in the setting of hypertensive crisis. Acute gestational HTN on chronic  HTN as well.    Plan:   Recommend lisinopril-HCTZ 20/25 PO daily, and continue labetalol 200 PO bid. She is stable from a medicine prospective. She will follow up with Kae Fernandes on Friday AM.  Patient is not intending to breastfeed and is aware of risks of taking these medications when breastfeeding.

## 2017-08-02 NOTE — PLAN OF CARE
Last documented BP is 142/69, from medicine stand point she is good to go. We recommend Lisinopril-HCTZ 20/25 mg daily, Labetolol 20 mg daily for BP control. Pt reports she is not breast-feeding, F/u appointment with Kae Fernandes Fri Am, submit through EPIC

## 2017-08-02 NOTE — DISCHARGE SUMMARY
[facility]  Obstetrics  Discharge Summary      Patient Name: Corrine Hicks  MRN: [id number]  Admission Date: 2017  Hospital Length of Stay: 0 days  Discharge Date and Time:  2017 5:55 PM  Attending Physician: Michael A. Wiedemann, MD   Discharging Provider: Nikita Nuno MD  Primary Care Provider: Primary Doctor No    HPI: 31 yo now  female s/p repeat LTCS for cHTN with superimposed pre-eclampsia on 2017. The patient presented yesterday with shortness of breath and severe range blood pressures.  Chest Xray and Chest CT PE was ordered in the ED, noting possible CHF and pulmonary edema. The patient was admitted for management.    Hospital Course: The patient was admitted to the OBGYN service. She was provided with IV anti-hypertensive medications to decrease her blood pressure.  Consults with cardiology and internal medicine were completed.  Patient was started on HCTZ/lisinopril combination.    Consults         Status Ordering Provider     Inpatient consult to Cardiology[facility]     Provider:  (Not yet assigned)    Completed NIKITA NUNO     Inpatient consult to Hospital Medicine-[facility]     Provider:  Regine Shay MD    Completed JOSE DE JESUS TOVAR          Final Active Diagnoses:    Diagnosis Date Noted POA    PRINCIPAL PROBLEM:  Acute pulmonary edema [J81.0] 2017 Yes      Problems Resolved During this Admission:    Diagnosis Date Noted Date Resolved POA      Feeding Method: bottle    Immunizations     Date Immunization Status Dose Route/Site Given by    17 1127 MMR Deferred (Other) 0.5 mL Subcutaneous/Left deltoid Bigg Jeffers RN    17 1148 Tdap Deleted 0.5 mL Intramuscular/Left deltoid     17 1145 Tdap Given 0.5 mL Intramuscular/Left deltoid Bigg Jeffers RN          Delivery:    Episiotomy: None   Lacerations: None   Repair suture: None   Repair # of packets:     Blood loss (ml): 0     Birth  information:  YOB: 2017   Time of birth: 5:53 PM   Sex: male   Delivery type: , Low Transverse   Gestational Age: 36w0d    Delivery Clinician:      Other providers:       Additional  information:  Forceps:    Vacuum:    Breech:    Observed anomalies      Living?:           APGARS  One minute Five minutes Ten minutes   Skin color:         Heart rate:         Grimace:         Muscle tone:         Breathing:         Totals: 8  9        Placenta: Delivered:       appearance    Pending Diagnostic Studies:     None          Discharged Condition: fair    Disposition: Home or Self Care    Follow Up:  Follow-up Information     Kae Fernandes MD.    Specialty:  Internal Medicine  Why:  this Friday AM for BP  Contact information:  [address]  [address]  [place] [address]  [phone]             Michael A Wiedemann, MD.    Specialties:  Obstetrics, Obstetrics and Gynecology  Why:  1 week for BP check and incision check - please call office to schedule  Contact information:  [address]  [place] [address]  [phone]                 Patient Instructions:     Diet general     Activity as tolerated       Medications:  Current Discharge Medication List      START taking these medications    Details   lisinopril-hydrochlorothiazide (PRINZIDE,ZESTORETIC) 20-25 mg Tab Take 1 tablet by mouth once daily.  Qty: 90 tablet, Refills: 3    Associated Diagnoses: HTN in pregnancy, chronic         CONTINUE these medications which have CHANGED    Details   labetalol (NORMODYNE) 300 MG tablet Take 1 tablet (300 mg total) by mouth 2 (two) times daily.  Qty: 60 tablet, Refills: 12    Associated Diagnoses: HTN in pregnancy, chronic         CONTINUE these medications which have NOT CHANGED    Details   naproxen sodium (ANAPROX DS) 550 MG tablet Take 1 tablet (550 mg total) by mouth 2 (two) times daily with meals.  Qty: 20 tablet, Refills: 3      !! oxycodone-acetaminophen (PERCOCET) 5-325 mg per  tablet Take 1 tablet by mouth every 4 (four) hours as needed for Pain.  Qty: 30 tablet, Refills: 0      !! oxycodone-acetaminophen (PERCOCET) 5-325 mg per tablet Take 1 tablet by mouth every 6 (six) hours as needed.  Qty: 30 tablet, Refills: 0       !! - Potential duplicate medications found. Please discuss with provider.      STOP taking these medications       naproxen (NAPROSYN) 500 MG tablet Comments:   Reason for Stopping:               Nikita Nuno MD  Obstetrics [facility]

## 2017-08-02 NOTE — CONSULTS
Consult Reason: Possible cardiomyopathy    CC: SOB    HPI: Pt is a 31 yo WF  with no PMH who was diagnosed with gestational HTN at 28 weeks.  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  She delivered a week ago at 36 weeks, but continued to be hypertensive.  She was on labetalol 200mg BID and was told to continue to post partum.  During this time she remained hypertensive at [facility].  She began having lower extremity swelling and then felt as if her upper extremities were swollen.  She had some SOB and mild back pain and was concerned for Pe.  Came to ED and given a dose of lasix with improvement of symptoms.  CTA was neg.  BP remained elevated and requiring intermittent IV labetalol.  This AM she is frustrated because she wants to go home and wants her BP normal.  She says she will not breast feed if it will help with medication selection.  She is otherwise without complaints.    PMH: As above    Surgery:     Fh: None    Sh: Denies ETOH, tob, and illicits.  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    NKDA    ROS: 10 point ROS otherwise negative except what's stated in the HPI.    Physical Exam:  Vitals:    17 0857   BP: (!) 198/91   Pulse: 64   Resp:    Temp:      General: NAD, WDWN, AAOX3, obese  HEENT: Normocephalic, atraumatic, PERRLA, EOMI, trachea midline, no JVD  Resp: CTAB, no r/r/w  CV: S1/S2, RRR, no m/r/g, 2+ distal pulses, no edema  GI: BS +, NTND, soft, no organomegaly  Ext: MAEW, no joint deformities or swelling  Skin: No rash, erythema, or skin breakdown  Neuro: CN 2-12 intact, no focal weakness, normal tone      Labs: Labs reviewed an noted.  .    Imaging: Images reviewed by myself personally and agree with radiologist reads.    CXR:  Findings suggesting pulmonary edema/CHF pattern with small pleural effusions, and probable right basilar airspace disease.    CTA:  1. No evidence of PE or pulmonary infarction.    2. Cardiomegaly with findings suggesting  mild pulmonary edema/CHF pattern with small layering bilateral pleural effusions.    3. Bibasilar patchy groundglass opacities concerning for superimposed confluent pulmonary edema versus aspiration or pneumonia. Clinical correlation advised. Followup as warranted.    4. Mildly enlarged right hilar lymph node, nonspecific but may be reactive.    TTE:  CONCLUSIONS     1 - Moderate left atrial enlargement.     2 - No wall motion abnormalities.     3 - Normal left ventricular systolic function (EF 55-60%).     4 - Normal left ventricular diastolic function.     5 - Normal right ventricular systolic function .     6 - The estimated PA systolic pressure is 20 mmHg.     Assessment:  Pt is a 31 yo WF  with no PMH who was diagnosed with gestational HTN at 28 weeks.  HTN persisted after delivery at 36 weeks.  Currently on labetalol 300mg BID.  If not breastfeeding, will add lisinopril/HCTZ combo.  Told to avoid salt and keep a strict BP log.  TTE showed good EF and no evidence of diastolic dysfunction.  Will discuss with attending.    1. Suspect gestational HTN on chronic HTN    Plan:  - Add lisinopril/hctz 10/12.5mg Daily  - Cont labetalol 300mg BID  - Low Na diet  - Strict BP log  - Staff attestation to follow  -

## 2017-08-07 ENCOUNTER — TELEPHONE (OUTPATIENT)
Dept: OBSTETRICS AND GYNECOLOGY | Facility: CLINIC | Age: 32
End: 2017-08-07

## 2017-08-07 NOTE — TELEPHONE ENCOUNTER
Patient contacted and scheduled for post op appointment on 8-11-17 at 1000. Patient verbalized understanding.

## 2017-08-10 NOTE — PHYSICIAN QUERY
PT Name: Corrine Hicks  MR #: [id number]     Physician Query Form - Documentation Clarification      CDS/: Claudia Mayo               Contact information: [email or web address]    This form is a permanent document in the medical record.     Query Date: August 10, 2017    By submitting this query, we are merely seeking further clarification of documentation. Please utilize your independent clinical judgment when addressing the question(s) below.    The Medical record reflects the following:    Supporting Clinical Findings Location in Medical Record   Diabeties        Anesthesia record   Diet controlled gestational diabetic        Care plan 7-26                                                                            Doctor, Please specify if diet controlled gestational diabetes is a confirmed diagnosis for this hospital stay.  Thank you  Provider Use Only      [  ] yes  [  ] no  [  ] other __________________  [  ] unable to determine

## 2017-08-10 NOTE — PHYSICIAN QUERY
PT Name: Corrine Hicks  MR #: [id number]     Physician Query Form - Documentation Clarification      CDS/: Claudia Mayo               Contact information: [email or web address]    This form is a permanent document in the medical record.     Query Date: August 10, 2017    By submitting this query, we are merely seeking further clarification of documentation. Please utilize your independent clinical judgment when addressing the question(s) below.    The Medical record reflects the following:    Supporting Clinical Findings Location in Medical Record   36 weeks with chtn with superimposed pih/pre e with severe features  bp good on labetalol bid     Progress notes 7-27   Pregnancy induced hypertension over chronic hypertension      early delivery due to pih Op note        Discharge summary                                                                            Doctor, Please specify type of hypertension confirmed for this hospital visit. Thank you    Provider Use Only    [  ] chronic pre existing hypertension  [  ] pregnancy induced hypertension  [  ] other __________________  [  ] unable to determine

## 2017-08-11 ENCOUNTER — OFFICE VISIT (OUTPATIENT)
Dept: OBSTETRICS AND GYNECOLOGY | Facility: CLINIC | Age: 32
End: 2017-08-11
Payer: COMMERCIAL

## 2017-08-11 VITALS
BODY MASS INDEX: 40.88 KG/M2 | HEIGHT: 65 IN | WEIGHT: 245.38 LBS | SYSTOLIC BLOOD PRESSURE: 128 MMHG | DIASTOLIC BLOOD PRESSURE: 90 MMHG

## 2017-08-11 DIAGNOSIS — O16.9 HYPERTENSION AFFECTING PREGNANCY, UNSPECIFIED TRIMESTER: Primary | ICD-10-CM

## 2017-08-11 PROCEDURE — 99999 PR PBB SHADOW E&M-EST. PATIENT-LVL II: CPT | Mod: PBBFAC,,, | Performed by: OBSTETRICS & GYNECOLOGY

## 2017-08-11 PROCEDURE — 99212 OFFICE O/P EST SF 10 MIN: CPT | Mod: 24,S$GLB,, | Performed by: OBSTETRICS & GYNECOLOGY

## 2017-08-11 PROCEDURE — 3080F DIAST BP >= 90 MM HG: CPT | Mod: S$GLB,,, | Performed by: OBSTETRICS & GYNECOLOGY

## 2017-08-11 PROCEDURE — 3008F BODY MASS INDEX DOCD: CPT | Mod: S$GLB,,, | Performed by: OBSTETRICS & GYNECOLOGY

## 2017-08-11 PROCEDURE — 3074F SYST BP LT 130 MM HG: CPT | Mod: S$GLB,,, | Performed by: OBSTETRICS & GYNECOLOGY

## 2017-08-13 NOTE — PROGRESS NOTES
"32 y.o.   OB History      Para Term  AB Living    2 2 1 1 0 2    SAB TAB Ectopic Multiple Live Births    0 0 0 0 2        Comlaining of: pt here for bp check and med counselling  Was admitted post op for elevated bp, was given labetalol and lsinopril  Lost 40lbs so far, much of it fluid  Currently on lisinopril and hctz only, not on labetalol    bp 120/90 today  Gi and gu good  No headaches, edema much better          ROS:  GENERAL: No fever, chills, fatigability or weight loss.  SKIN: No rashes, itching or changes in color or texture of skin.  HEAD: No headaches or recent head trauma.  EYES: Visual acuity fine. No photophobia, ocular pain or diplopia.  EARS: Denies ear pain, discharge or vertigo.  NOSE: No loss of smell, no epistaxis or postnasal drip.  MOUTH & THROAT: No hoarseness or change in voice. No excessive gum bleeding.  NODES: Denies swollen glands.  CHEST: Denies SWEET, cyanosis, wheezing, cough and sputum production.  CARDIOVASCULAR: Denies chest pain, PND, orthopnea or reduced exercise tolerance.  ABDOMEN: Appetite fine. No weight loss. Denies diarrhea, abdominal pain, hematemesis or blood in stool.  URINARY: No flank pain, dysuria or hematuria.  PERIPHERAL VASCULAR: No claudication or cyanosis.  MUSCULOSKELETAL: No joint stiffness or swelling. Denies back pain.  NEUROLOGIC: No history of seizures, paralysis, alteration of gait or coordination      PE: BP (!) 128/90   Ht 5' 5" (1.651 m)   Wt 111.3 kg (245 lb 6 oz)   LMP 2016 (Exact Date)   Breastfeeding? No   BMI 40.83 kg/m²    abd soft  nontender  Inc intace, no drainage or erythema  Healing well    assesemnt PIH stable/resolving  Plan, cont present meds  Has appt with primary care  Fu thereafter with me for pap 6 weeks      "

## 2017-08-15 NOTE — PHYSICIAN QUERY
PT Name: Corrine Hicks  MR #: [id number]     Physician Query Form - Documentation Clarification      CDS/: Claudia Mayo               Contact information: [email or web address]    This form is a permanent document in the medical record.     Query Date: August 15, 2017    By submitting this query, we are merely seeking further clarification of documentation. Please utilize your independent clinical judgment when addressing the question(s) below.    The Medical record reflects the following:    Supporting Clinical Findings Location in Medical Record   Pulmonary edema     Sob improved , possible chf, cardiomyopathy sp lasix in en on admission    Cardiomegaly with findings suggestive of mild pulmonary edema/chf h and p 8-1    pn 8-2        Consult 8-2   EF 55-60%       Echo 8-1                                                                            Doctor, Please specify type of chf confirmed for this hospital stay.  Thank you    Provider Use Only    [  ] chronic systolic congestive heart failure  [  ] chronic diastolic congestive heart failure  [  ] acute on chronic diastolic congestive heart failure  [  ] acute on chronic systolic congestive heart failure  [  ] other __________________________________  [ x ] unable to determine                                                                                                                           [  ] Clinically undetermined

## 2017-08-22 ENCOUNTER — PATIENT MESSAGE (OUTPATIENT)
Dept: OBSTETRICS AND GYNECOLOGY | Facility: CLINIC | Age: 32
End: 2017-08-22

## 2017-08-22 ENCOUNTER — TELEPHONE (OUTPATIENT)
Dept: OBSTETRICS AND GYNECOLOGY | Facility: CLINIC | Age: 32
End: 2017-08-22

## 2017-08-23 ENCOUNTER — PATIENT MESSAGE (OUTPATIENT)
Dept: OBSTETRICS AND GYNECOLOGY | Facility: CLINIC | Age: 32
End: 2017-08-23

## 2017-08-23 RX ORDER — DESOGESTREL AND ETHINYL ESTRADIOL 0.15-0.03
1 KIT ORAL DAILY
Qty: 28 TABLET | Refills: 2 | Status: SHIPPED | OUTPATIENT
Start: 2017-08-23 | End: 2017-11-27 | Stop reason: SDUPTHER

## 2017-08-25 ENCOUNTER — PATIENT MESSAGE (OUTPATIENT)
Dept: OBSTETRICS AND GYNECOLOGY | Facility: CLINIC | Age: 32
End: 2017-08-25

## 2017-08-28 ENCOUNTER — TELEPHONE (OUTPATIENT)
Dept: OBSTETRICS AND GYNECOLOGY | Facility: CLINIC | Age: 32
End: 2017-08-28

## 2017-08-28 NOTE — TELEPHONE ENCOUNTER
----- Message from Sharri Destinee sent at 8/28/2017  3:06 PM CDT -----  Contact: Brando, [facility], [phone] ext [phone]  Called in requesting date doctor put patient out of work. Please advise.

## 2017-09-11 ENCOUNTER — PATIENT MESSAGE (OUTPATIENT)
Dept: OBSTETRICS AND GYNECOLOGY | Facility: CLINIC | Age: 32
End: 2017-09-11

## 2017-10-02 ENCOUNTER — LAB VISIT (OUTPATIENT)
Dept: LAB | Facility: HOSPITAL | Age: 32
End: 2017-10-02
Attending: OBSTETRICS & GYNECOLOGY
Payer: COMMERCIAL

## 2017-10-02 ENCOUNTER — OFFICE VISIT (OUTPATIENT)
Dept: OBSTETRICS AND GYNECOLOGY | Facility: CLINIC | Age: 32
End: 2017-10-02
Payer: COMMERCIAL

## 2017-10-02 VITALS
HEIGHT: 65 IN | BODY MASS INDEX: 42.2 KG/M2 | DIASTOLIC BLOOD PRESSURE: 100 MMHG | SYSTOLIC BLOOD PRESSURE: 142 MMHG | WEIGHT: 253.31 LBS

## 2017-10-02 DIAGNOSIS — N91.2 AMENORRHEA: Primary | ICD-10-CM

## 2017-10-02 DIAGNOSIS — N91.2 AMENORRHEA: ICD-10-CM

## 2017-10-02 DIAGNOSIS — F41.9 ANXIETY: ICD-10-CM

## 2017-10-02 LAB
ANION GAP SERPL CALC-SCNC: 9 MMOL/L
BUN SERPL-MCNC: 18 MG/DL
CALCIUM SERPL-MCNC: 10.1 MG/DL
CHLORIDE SERPL-SCNC: 104 MMOL/L
CO2 SERPL-SCNC: 26 MMOL/L
CREAT SERPL-MCNC: 0.9 MG/DL
EST. GFR  (AFRICAN AMERICAN): >60 ML/MIN/1.73 M^2
EST. GFR  (NON AFRICAN AMERICAN): >60 ML/MIN/1.73 M^2
GLUCOSE SERPL-MCNC: 128 MG/DL
POTASSIUM SERPL-SCNC: 3.5 MMOL/L
SODIUM SERPL-SCNC: 139 MMOL/L
TSH SERPL DL<=0.005 MIU/L-ACNC: 1.31 UIU/ML

## 2017-10-02 PROCEDURE — 80048 BASIC METABOLIC PNL TOTAL CA: CPT

## 2017-10-02 PROCEDURE — 84443 ASSAY THYROID STIM HORMONE: CPT

## 2017-10-02 PROCEDURE — 3008F BODY MASS INDEX DOCD: CPT | Mod: S$GLB,,, | Performed by: OBSTETRICS & GYNECOLOGY

## 2017-10-02 PROCEDURE — 3080F DIAST BP >= 90 MM HG: CPT | Mod: S$GLB,,, | Performed by: OBSTETRICS & GYNECOLOGY

## 2017-10-02 PROCEDURE — 99213 OFFICE O/P EST LOW 20 MIN: CPT | Mod: S$GLB,,, | Performed by: OBSTETRICS & GYNECOLOGY

## 2017-10-02 PROCEDURE — 99999 PR PBB SHADOW E&M-EST. PATIENT-LVL II: CPT | Mod: PBBFAC,,, | Performed by: OBSTETRICS & GYNECOLOGY

## 2017-10-02 PROCEDURE — 36415 COLL VENOUS BLD VENIPUNCTURE: CPT

## 2017-10-02 PROCEDURE — 3077F SYST BP >= 140 MM HG: CPT | Mod: S$GLB,,, | Performed by: OBSTETRICS & GYNECOLOGY

## 2017-10-02 RX ORDER — CITALOPRAM 10 MG/1
10 TABLET ORAL DAILY
Qty: 30 TABLET | Refills: 2 | Status: SHIPPED | OUTPATIENT
Start: 2017-10-02 | End: 2018-10-02

## 2017-10-02 NOTE — PROGRESS NOTES
"32 y.o.   OB History      Para Term  AB Living    2 2 1 1 0 2    SAB TAB Ectopic Multiple Live Births    0 0 0 0 2        Comlaining of: hot flashes anxiety, some panick, vag dryness  Finished one month ocp  Not much irreg bleeding  Gi and gu good        ROS:  GENERAL: No fever, chills, fatigability or weight loss.  SKIN: No rashes, itching or changes in color or texture of skin.  HEAD: No headaches or recent head trauma.  EYES: Visual acuity fine. No photophobia, ocular pain or diplopia.  EARS: Denies ear pain, discharge or vertigo.  NOSE: No loss of smell, no epistaxis or postnasal drip.  MOUTH & THROAT: No hoarseness or change in voice. No excessive gum bleeding.  NODES: Denies swollen glands.  CHEST: Denies SWEET, cyanosis, wheezing, cough and sputum production.  CARDIOVASCULAR: Denies chest pain, PND, orthopnea or reduced exercise tolerance.  ABDOMEN: Appetite fine. No weight loss. Denies diarrhea, abdominal pain, hematemesis or blood in stool.  URINARY: No flank pain, dysuria or hematuria.  PERIPHERAL VASCULAR: No claudication or cyanosis.  MUSCULOSKELETAL: No joint stiffness or swelling. Denies back pain.  NEUROLOGIC: No history of seizures, paralysis, alteration of gait or coordination      PE: BP (!) 142/100   Ht 5' 5" (1.651 m)   Wt 114.9 kg (253 lb 4.9 oz)   LMP 2016 (Exact Date)   Breastfeeding? No   BMI 42.15 kg/m²    abd sfot  Healing well  Pelvic def    Discussed symptmos  Sometimes thyroid problems, hormone problems, etc    Assessment' pp anxiety  Hormone irreg  Plan, labs, stay on ocp, rx celexa 10  Pt states sheis noncompliant with bp meds        "

## 2017-10-03 ENCOUNTER — TELEPHONE (OUTPATIENT)
Dept: OBSTETRICS AND GYNECOLOGY | Facility: CLINIC | Age: 32
End: 2017-10-03

## 2017-10-03 NOTE — TELEPHONE ENCOUNTER
----- Message from Michael A. Wiedemann, MD sent at 10/3/2017  4:55 AM CDT -----  Tell her the labs were ok, keep the plan, stay on pills, start the celexa, and lets see  Thanks bbb

## 2017-11-06 PROBLEM — J81.0 ACUTE PULMONARY EDEMA: Status: RESOLVED | Noted: 2017-08-01 | Resolved: 2017-11-06

## 2017-11-24 ENCOUNTER — PATIENT MESSAGE (OUTPATIENT)
Dept: OBSTETRICS AND GYNECOLOGY | Facility: CLINIC | Age: 32
End: 2017-11-24

## 2017-11-27 RX ORDER — DESOGESTREL AND ETHINYL ESTRADIOL 0.15-0.03
1 KIT ORAL DAILY
Qty: 28 TABLET | Refills: 1 | Status: SHIPPED | OUTPATIENT
Start: 2017-11-27 | End: 2017-12-15 | Stop reason: SDUPTHER

## 2017-12-11 ENCOUNTER — PATIENT MESSAGE (OUTPATIENT)
Dept: OBSTETRICS AND GYNECOLOGY | Facility: CLINIC | Age: 32
End: 2017-12-11

## 2017-12-15 ENCOUNTER — OFFICE VISIT (OUTPATIENT)
Dept: OBSTETRICS AND GYNECOLOGY | Facility: CLINIC | Age: 32
End: 2017-12-15
Payer: COMMERCIAL

## 2017-12-15 VITALS
WEIGHT: 254.63 LBS | SYSTOLIC BLOOD PRESSURE: 136 MMHG | BODY MASS INDEX: 42.42 KG/M2 | DIASTOLIC BLOOD PRESSURE: 82 MMHG | HEIGHT: 65 IN

## 2017-12-15 DIAGNOSIS — Z12.4 ROUTINE PAPANICOLAOU SMEAR: ICD-10-CM

## 2017-12-15 DIAGNOSIS — Z01.419 WELL WOMAN EXAM WITH ROUTINE GYNECOLOGICAL EXAM: ICD-10-CM

## 2017-12-15 PROCEDURE — 88175 CYTOPATH C/V AUTO FLUID REDO: CPT

## 2017-12-15 PROCEDURE — 99395 PREV VISIT EST AGE 18-39: CPT | Mod: S$GLB,,, | Performed by: OBSTETRICS & GYNECOLOGY

## 2017-12-15 PROCEDURE — 99999 PR PBB SHADOW E&M-EST. PATIENT-LVL II: CPT | Mod: PBBFAC,,, | Performed by: OBSTETRICS & GYNECOLOGY

## 2017-12-15 RX ORDER — DESOGESTREL AND ETHINYL ESTRADIOL 0.15-0.03
1 KIT ORAL DAILY
Qty: 28 TABLET | Refills: 12 | Status: SHIPPED | OUTPATIENT
Start: 2017-12-15 | End: 2018-10-26 | Stop reason: SDUPTHER

## 2017-12-15 NOTE — PROGRESS NOTES
"HPI:   32 y.o.   OB History      Para Term  AB Living    2 2 1 1 0 2    SAB TAB Ectopic Multiple Live Births    0 0 0 0 2       Patient's last menstrual period was 2017 (exact date).    Patient is  here for her annual gynecologic exam.  She has no complaints.     ROS:  GENERAL: No fever, chills, fatigability or weight loss.  SKIN: No rashes, itching or changes in color or texture of skin.  HEAD: No headaches or recent head trauma.  EYES: Visual acuity fine. No photophobia, ocular pain or diplopia.  EARS: Denies ear pain, discharge or vertigo.  NOSE: No loss of smell, no epistaxis or postnasal drip.  MOUTH & THROAT: No hoarseness or change in voice. No excessive gum bleeding.  NODES: Denies swollen glands.  CHEST: Denies SWEET, cyanosis, wheezing, cough and sputum production.  CARDIOVASCULAR: Denies chest pain, PND, orthopnea or reduced exercise tolerance.  ABDOMEN: Appetite fine. No weight loss. Denies diarrhea, abdominal pain, hematemesis or blood in stool.  URINARY: No flank pain, dysuria or hematuria.  PERIPHERAL VASCULAR: No claudication or cyanosis.  MUSCULOSKELETAL: No joint stiffness or swelling. Denies back pain.  NEUROLOGIC: No history of seizures, paralysis, alteration of gait or coordination.    PE:   /82   Ht 5' 5" (1.651 m)   Wt 115.5 kg (254 lb 10.1 oz)   LMP 2017 (Exact Date)   Breastfeeding? No   BMI 42.37 kg/m²   APPEARANCE: Well nourished, well developed, in no acute distress.  NECK: Neck symmetric without masses or thyromegaly.  BREASTS: Symmetrical, no skin changes or visible lesions. No palpable masses, nipple discharge or adenopathy bilaterally.  ABDOMEN: Flat. Soft. No tenderness or masses. No hepatosplenomegaly. No hernias. No CVA tenderness.  VULVA: No lesions. Normal female genitalia.  URETHRAL MEATUS: Normal size and location, no lesions, no prolapse.  URETHRA: No masses, tenderness, prolapse or scarring.  VAGINA: Moist and well rugated, no discharge, no " significant cystocele or rectocele.  CERVIX: No lesions and discharge. PAP done.  UTERUS: Normal size, regular shape, mobile, non-tender, bladder base nontender.  ADNEXA: No masses, tenderness or CDS nodularity.  ANUS PERINEUM: Normal.    PROCEDURES:  Pap smear    Assessment:  Normal Gynecologic Exam    Plan:  Mammogram and Colonoscopy if indicated by current recommendations.  Return to clinic in one year or for any problems or complaints.  Doing well on ocp  Discussed/stressed to loose weight

## 2018-05-22 ENCOUNTER — PATIENT MESSAGE (OUTPATIENT)
Dept: OBSTETRICS AND GYNECOLOGY | Facility: CLINIC | Age: 33
End: 2018-05-22

## 2018-05-22 RX ORDER — CITALOPRAM 10 MG/1
10 TABLET ORAL DAILY
Qty: 30 TABLET | Refills: 2 | Status: SHIPPED | OUTPATIENT
Start: 2018-05-22 | End: 2018-11-01

## 2018-05-22 NOTE — TELEPHONE ENCOUNTER
Patient is requesting a prescription for celexa  She states she can't see pcp until July.  Please advise.

## 2018-09-07 ENCOUNTER — PATIENT MESSAGE (OUTPATIENT)
Dept: OBSTETRICS AND GYNECOLOGY | Facility: CLINIC | Age: 33
End: 2018-09-07

## 2018-10-26 NOTE — PROGRESS NOTES
Subjective:       Patient ID: Corrine Hicks is a 33 y.o. female.    Chief Complaint: Establish Care and Annual Exam    Patient is a 33 y.o.female who presents today for annual      HTN: fairly well controlled off medication    She was taking celexa 10 mg after her children.  Stopped it as it wasn't working.      Cholesterol: due now  Vaccines: up to date  Gyn exam: wiedemann  Exercise: none  LMP:on birth control      Past Medical History:   Diagnosis Date    Coronary artery disease     large heart noted on CXR , No problems    Diabetes mellitus     previous and current pregnancy, diet controlled.    Gestational diabetes     Hypertension     preEclampsia    Pregnancy     x 2; preecalmpsia for second one     Past Surgical History:   Procedure Laterality Date     SECTION      DELIVERY- SECTION N/A 2017    Performed by Michael A. Wiedemann, [facility] at [facility]    TONSILLECTOMY       Social History     Socioeconomic History    Marital status: Single     Spouse name: Not on file    Number of children: Not on file    Years of education: Not on file    Highest education level: Not on file   Social Needs    Financial resource strain: Not on file    Food insecurity - worry: Not on file    Food insecurity - inability: Not on file    Transportation needs - medical: Not on file    Transportation needs - non-medical: Not on file   Occupational History    Not on file   Tobacco Use    Smoking status: Current Every Day Smoker     Packs/day: 0.50     Types: Cigarettes    Smokeless tobacco: Never Used   Substance and Sexual Activity    Alcohol use: No    Drug use: No    Sexual activity: Yes   Other Topics Concern    Not on file   Social History Narrative    Not on file     Review of patient's allergies indicates:  No Known Allergies  Corrine Hicks had no medications administered during this visit.    Review of Systems   Constitutional: Negative for appetite change, chills,  diaphoresis, fatigue and fever.   HENT: Negative for congestion, dental problem, ear discharge, ear pain, hearing loss, postnasal drip, sinus pressure and sore throat.    Eyes: Negative for discharge, redness and itching.   Respiratory: Negative for cough, chest tightness, shortness of breath and wheezing.    Cardiovascular: Negative for chest pain, palpitations and leg swelling.   Gastrointestinal: Negative for abdominal pain, constipation, diarrhea, nausea and vomiting.   Endocrine: Negative for cold intolerance and heat intolerance.   Genitourinary: Negative for difficulty urinating, frequency, hematuria and urgency.   Musculoskeletal: Negative for arthralgias, back pain, gait problem, myalgias and neck pain.   Skin: Negative for color change and rash.   Neurological: Negative for dizziness, syncope and headaches.   Hematological: Negative for adenopathy.   Psychiatric/Behavioral: Negative for behavioral problems and sleep disturbance. The patient is not nervous/anxious.        Objective:      Physical Exam   Constitutional: She is oriented to person, place, and time. She appears well-developed and well-nourished. No distress.   HENT:   Head: Normocephalic and atraumatic.   Right Ear: External ear normal.   Left Ear: External ear normal.   Nose: Nose normal.   Mouth/Throat: Oropharynx is clear and moist. No oropharyngeal exudate.   Eyes: Conjunctivae and EOM are normal. Pupils are equal, round, and reactive to light. Right eye exhibits no discharge. Left eye exhibits no discharge. No scleral icterus.   Neck: Normal range of motion. Neck supple. No JVD present. No thyromegaly present.   Cardiovascular: Normal rate, regular rhythm, normal heart sounds and intact distal pulses. Exam reveals no gallop and no friction rub.   No murmur heard.  Pulmonary/Chest: Effort normal and breath sounds normal. No stridor. No respiratory distress. She has no wheezes. She has no rales. She exhibits no tenderness.   Abdominal: Soft.  Bowel sounds are normal. She exhibits no distension. There is no tenderness. There is no rebound.   Musculoskeletal: Normal range of motion. She exhibits no edema or tenderness.   Lymphadenopathy:     She has no cervical adenopathy.   Neurological: She is alert and oriented to person, place, and time. No cranial nerve deficit.   Skin: Skin is warm and dry. No rash noted. She is not diaphoretic. No erythema.   Psychiatric: She has a normal mood and affect. Her behavior is normal.   Nursing note and vitals reviewed.      Assessment and Plan:       1. Annual physical exam  - CBC auto differential; Future  - Comprehensive metabolic panel; Future  - Lipid panel; Future  - TSH; Future  - Urinalysis; Future  - Vitamin D; Future  - THYROID PEROXIDASE ANTIBODY; Future  - T4, free; Future  - Hemoglobin A1c; Future    2. Fatigue, unspecified type  - CBC auto differential; Future  - Comprehensive metabolic panel; Future  - Lipid panel; Future  - TSH; Future  - Urinalysis; Future  - Vitamin D; Future  - THYROID PEROXIDASE ANTIBODY; Future  - T4, free; Future  - Hemoglobin A1c; Future    3. Weight gain  - CBC auto differential; Future  - Comprehensive metabolic panel; Future  - Lipid panel; Future  - TSH; Future  - Urinalysis; Future  - Vitamin D; Future  - THYROID PEROXIDASE ANTIBODY; Future  - T4, free; Future  - Hemoglobin A1c; Future    4. Swelling of hand, unspecified laterality  - CBC auto differential; Future  - Comprehensive metabolic panel; Future  - Lipid panel; Future  - TSH; Future  - Urinalysis; Future  - Vitamin D; Future  - THYROID PEROXIDASE ANTIBODY; Future  - T4, free; Future    5. Depression: trial of wellbutrin; rtc in one month          No Follow-up on file.

## 2018-10-29 RX ORDER — DESOGESTREL AND ETHINYL ESTRADIOL 0.15-0.03
1 KIT ORAL DAILY
Qty: 28 TABLET | Refills: 4 | Status: SHIPPED | OUTPATIENT
Start: 2018-10-29 | End: 2018-11-20 | Stop reason: SDUPTHER

## 2018-11-01 ENCOUNTER — OFFICE VISIT (OUTPATIENT)
Dept: INTERNAL MEDICINE | Facility: CLINIC | Age: 33
End: 2018-11-01
Payer: COMMERCIAL

## 2018-11-01 VITALS
SYSTOLIC BLOOD PRESSURE: 130 MMHG | RESPIRATION RATE: 16 BRPM | TEMPERATURE: 99 F | HEIGHT: 66 IN | WEIGHT: 261.94 LBS | DIASTOLIC BLOOD PRESSURE: 82 MMHG | HEART RATE: 72 BPM | BODY MASS INDEX: 42.1 KG/M2

## 2018-11-01 DIAGNOSIS — R53.83 FATIGUE, UNSPECIFIED TYPE: ICD-10-CM

## 2018-11-01 DIAGNOSIS — Z00.00 ANNUAL PHYSICAL EXAM: Primary | ICD-10-CM

## 2018-11-01 DIAGNOSIS — F32.A DEPRESSION, UNSPECIFIED DEPRESSION TYPE: ICD-10-CM

## 2018-11-01 DIAGNOSIS — M79.89 SWELLING OF HAND, UNSPECIFIED LATERALITY: ICD-10-CM

## 2018-11-01 DIAGNOSIS — R63.5 WEIGHT GAIN: ICD-10-CM

## 2018-11-01 PROCEDURE — 99385 PREV VISIT NEW AGE 18-39: CPT | Mod: S$GLB,,, | Performed by: INTERNAL MEDICINE

## 2018-11-01 PROCEDURE — 3079F DIAST BP 80-89 MM HG: CPT | Mod: CPTII,S$GLB,, | Performed by: INTERNAL MEDICINE

## 2018-11-01 PROCEDURE — 99999 PR PBB SHADOW E&M-EST. PATIENT-LVL III: CPT | Mod: PBBFAC,,, | Performed by: INTERNAL MEDICINE

## 2018-11-01 PROCEDURE — 3075F SYST BP GE 130 - 139MM HG: CPT | Mod: CPTII,S$GLB,, | Performed by: INTERNAL MEDICINE

## 2018-11-01 RX ORDER — BUPROPION HYDROCHLORIDE 150 MG/1
150 TABLET ORAL DAILY
Qty: 30 TABLET | Refills: 11 | Status: SHIPPED | OUTPATIENT
Start: 2018-11-01 | End: 2019-01-10

## 2018-11-01 RX ORDER — TRAMADOL HYDROCHLORIDE 50 MG/1
TABLET ORAL
Refills: 0 | COMMUNITY
Start: 2018-08-06 | End: 2018-11-01

## 2018-11-01 RX ORDER — KETOROLAC TROMETHAMINE 10 MG/1
TABLET, FILM COATED ORAL
Refills: 0 | COMMUNITY
Start: 2018-08-06 | End: 2018-11-01

## 2018-11-08 ENCOUNTER — LAB VISIT (OUTPATIENT)
Dept: LAB | Facility: HOSPITAL | Age: 33
End: 2018-11-08
Attending: INTERNAL MEDICINE
Payer: COMMERCIAL

## 2018-11-08 ENCOUNTER — TELEPHONE (OUTPATIENT)
Dept: INTERNAL MEDICINE | Facility: CLINIC | Age: 33
End: 2018-11-08

## 2018-11-08 ENCOUNTER — PATIENT MESSAGE (OUTPATIENT)
Dept: INTERNAL MEDICINE | Facility: CLINIC | Age: 33
End: 2018-11-08

## 2018-11-08 DIAGNOSIS — R53.83 FATIGUE, UNSPECIFIED TYPE: ICD-10-CM

## 2018-11-08 DIAGNOSIS — M79.89 SWELLING OF HAND, UNSPECIFIED LATERALITY: ICD-10-CM

## 2018-11-08 DIAGNOSIS — Z00.00 ANNUAL PHYSICAL EXAM: ICD-10-CM

## 2018-11-08 DIAGNOSIS — R63.5 WEIGHT GAIN: ICD-10-CM

## 2018-11-08 DIAGNOSIS — R82.90 ABNORMAL URINE: Primary | ICD-10-CM

## 2018-11-08 LAB
25(OH)D3+25(OH)D2 SERPL-MCNC: 18 NG/ML
ALBUMIN SERPL BCP-MCNC: 3.3 G/DL
ALP SERPL-CCNC: 86 U/L
ALT SERPL W/O P-5'-P-CCNC: 32 U/L
ANION GAP SERPL CALC-SCNC: 11 MMOL/L
AST SERPL-CCNC: 20 U/L
BACTERIA #/AREA URNS AUTO: NORMAL /HPF
BASOPHILS # BLD AUTO: 0.03 K/UL
BASOPHILS NFR BLD: 0.3 %
BILIRUB SERPL-MCNC: 0.3 MG/DL
BILIRUB UR QL STRIP: NEGATIVE
BUN SERPL-MCNC: 15 MG/DL
CALCIUM SERPL-MCNC: 9.4 MG/DL
CHLORIDE SERPL-SCNC: 110 MMOL/L
CHOLEST SERPL-MCNC: 167 MG/DL
CHOLEST/HDLC SERPL: 4.2 {RATIO}
CLARITY UR REFRACT.AUTO: ABNORMAL
CO2 SERPL-SCNC: 19 MMOL/L
COLOR UR AUTO: YELLOW
CREAT SERPL-MCNC: 0.7 MG/DL
DIFFERENTIAL METHOD: ABNORMAL
EOSINOPHIL # BLD AUTO: 0.3 K/UL
EOSINOPHIL NFR BLD: 2.8 %
ERYTHROCYTE [DISTWIDTH] IN BLOOD BY AUTOMATED COUNT: 13.1 %
EST. GFR  (AFRICAN AMERICAN): >60 ML/MIN/1.73 M^2
EST. GFR  (NON AFRICAN AMERICAN): >60 ML/MIN/1.73 M^2
ESTIMATED AVG GLUCOSE: 114 MG/DL
GLUCOSE SERPL-MCNC: 123 MG/DL
GLUCOSE UR QL STRIP: NEGATIVE
HBA1C MFR BLD HPLC: 5.6 %
HCT VFR BLD AUTO: 37.8 %
HDLC SERPL-MCNC: 40 MG/DL
HDLC SERPL: 24 %
HGB BLD-MCNC: 12.4 G/DL
HGB UR QL STRIP: NEGATIVE
HYALINE CASTS UR QL AUTO: 0 /LPF
IMM GRANULOCYTES # BLD AUTO: 0.02 K/UL
IMM GRANULOCYTES NFR BLD AUTO: 0.2 %
KETONES UR QL STRIP: NEGATIVE
LDLC SERPL CALC-MCNC: 102.2 MG/DL
LEUKOCYTE ESTERASE UR QL STRIP: NEGATIVE
LYMPHOCYTES # BLD AUTO: 2.1 K/UL
LYMPHOCYTES NFR BLD: 24.2 %
MCH RBC QN AUTO: 29.4 PG
MCHC RBC AUTO-ENTMCNC: 32.8 G/DL
MCV RBC AUTO: 90 FL
MICROSCOPIC COMMENT: NORMAL
MONOCYTES # BLD AUTO: 0.5 K/UL
MONOCYTES NFR BLD: 5.3 %
NEUTROPHILS # BLD AUTO: 6 K/UL
NEUTROPHILS NFR BLD: 67.2 %
NITRITE UR QL STRIP: NEGATIVE
NONHDLC SERPL-MCNC: 127 MG/DL
NRBC BLD-RTO: 0 /100 WBC
PH UR STRIP: 5 [PH] (ref 5–8)
PLATELET # BLD AUTO: 422 K/UL
PMV BLD AUTO: 9.5 FL
POTASSIUM SERPL-SCNC: 4 MMOL/L
PROT SERPL-MCNC: 7.3 G/DL
PROT UR QL STRIP: ABNORMAL
RBC # BLD AUTO: 4.22 M/UL
RBC #/AREA URNS AUTO: 1 /HPF (ref 0–4)
SODIUM SERPL-SCNC: 140 MMOL/L
SP GR UR STRIP: 1.03 (ref 1–1.03)
SQUAMOUS #/AREA URNS AUTO: 11 /HPF
T4 FREE SERPL-MCNC: 1.03 NG/DL
THYROPEROXIDASE IGG SERPL-ACNC: <6 IU/ML
TRIGL SERPL-MCNC: 124 MG/DL
TSH SERPL DL<=0.005 MIU/L-ACNC: 1.91 UIU/ML
URN SPEC COLLECT METH UR: ABNORMAL
WBC # BLD AUTO: 8.86 K/UL
WBC #/AREA URNS AUTO: 1 /HPF (ref 0–5)

## 2018-11-08 PROCEDURE — 86376 MICROSOMAL ANTIBODY EACH: CPT

## 2018-11-08 PROCEDURE — 84439 ASSAY OF FREE THYROXINE: CPT

## 2018-11-08 PROCEDURE — 85025 COMPLETE CBC W/AUTO DIFF WBC: CPT

## 2018-11-08 PROCEDURE — 81001 URINALYSIS AUTO W/SCOPE: CPT

## 2018-11-08 PROCEDURE — 80053 COMPREHEN METABOLIC PANEL: CPT

## 2018-11-08 PROCEDURE — 80061 LIPID PANEL: CPT

## 2018-11-08 PROCEDURE — 36415 COLL VENOUS BLD VENIPUNCTURE: CPT | Mod: PO

## 2018-11-08 PROCEDURE — 84443 ASSAY THYROID STIM HORMONE: CPT

## 2018-11-08 PROCEDURE — 83036 HEMOGLOBIN GLYCOSYLATED A1C: CPT

## 2018-11-08 PROCEDURE — 82306 VITAMIN D 25 HYDROXY: CPT

## 2018-11-08 RX ORDER — ERGOCALCIFEROL 1.25 MG/1
50000 CAPSULE ORAL WEEKLY
Qty: 12 CAPSULE | Refills: 0 | Status: SHIPPED | OUTPATIENT
Start: 2018-11-08 | End: 2019-01-10

## 2018-11-20 ENCOUNTER — PATIENT MESSAGE (OUTPATIENT)
Dept: OBSTETRICS AND GYNECOLOGY | Facility: CLINIC | Age: 33
End: 2018-11-20

## 2018-11-20 RX ORDER — DESOGESTREL AND ETHINYL ESTRADIOL 0.15-0.03
1 KIT ORAL DAILY
Qty: 28 TABLET | Refills: 0 | Status: SHIPPED | OUTPATIENT
Start: 2018-11-20 | End: 2018-12-19 | Stop reason: SDUPTHER

## 2018-12-20 RX ORDER — DESOGESTREL AND ETHINYL ESTRADIOL 0.15-0.03
KIT ORAL
Qty: 28 TABLET | Refills: 0 | Status: SHIPPED | OUTPATIENT
Start: 2018-12-20 | End: 2019-01-10 | Stop reason: SDUPTHER

## 2019-01-10 ENCOUNTER — OFFICE VISIT (OUTPATIENT)
Dept: OBSTETRICS AND GYNECOLOGY | Facility: CLINIC | Age: 34
End: 2019-01-10
Payer: COMMERCIAL

## 2019-01-10 VITALS
BODY MASS INDEX: 42.31 KG/M2 | DIASTOLIC BLOOD PRESSURE: 76 MMHG | HEIGHT: 66 IN | SYSTOLIC BLOOD PRESSURE: 124 MMHG | WEIGHT: 263.25 LBS

## 2019-01-10 DIAGNOSIS — Z12.4 ROUTINE PAPANICOLAOU SMEAR: Primary | ICD-10-CM

## 2019-01-10 DIAGNOSIS — Z01.419 WELL WOMAN EXAM WITH ROUTINE GYNECOLOGICAL EXAM: ICD-10-CM

## 2019-01-10 PROCEDURE — 88175 CYTOPATH C/V AUTO FLUID REDO: CPT

## 2019-01-10 PROCEDURE — 99395 PR PREVENTIVE VISIT,EST,18-39: ICD-10-PCS | Mod: S$GLB,,, | Performed by: OBSTETRICS & GYNECOLOGY

## 2019-01-10 PROCEDURE — 99999 PR PBB SHADOW E&M-EST. PATIENT-LVL III: CPT | Mod: PBBFAC,,, | Performed by: OBSTETRICS & GYNECOLOGY

## 2019-01-10 PROCEDURE — 99395 PREV VISIT EST AGE 18-39: CPT | Mod: S$GLB,,, | Performed by: OBSTETRICS & GYNECOLOGY

## 2019-01-10 PROCEDURE — 99999 PR PBB SHADOW E&M-EST. PATIENT-LVL III: ICD-10-PCS | Mod: PBBFAC,,, | Performed by: OBSTETRICS & GYNECOLOGY

## 2019-01-10 RX ORDER — DESOGESTREL AND ETHINYL ESTRADIOL 0.15-0.03
1 KIT ORAL DAILY
Qty: 84 TABLET | Refills: 3 | Status: SHIPPED | OUTPATIENT
Start: 2019-01-10 | End: 2020-09-24

## 2019-01-10 NOTE — PROGRESS NOTES
"HPI:   33 y.o.   OB History      Para Term  AB Living    2 2 1 1 0 2    SAB TAB Ectopic Multiple Live Births    0 0 0 0 2       Patient's last menstrual period was 2018 (exact date).    Patient is  here for her annual gynecologic exam.  She has no complaints.     ROS:  GENERAL: No fever, chills, fatigability or weight loss.  SKIN: No rashes, itching or changes in color or texture of skin.  HEAD: No headaches or recent head trauma.  EYES: Visual acuity fine. No photophobia, ocular pain or diplopia.  EARS: Denies ear pain, discharge or vertigo.  NOSE: No loss of smell, no epistaxis or postnasal drip.  MOUTH & THROAT: No hoarseness or change in voice. No excessive gum bleeding.  NODES: Denies swollen glands.  CHEST: Denies SWEET, cyanosis, wheezing, cough and sputum production.  CARDIOVASCULAR: Denies chest pain, PND, orthopnea or reduced exercise tolerance.  ABDOMEN: Appetite fine. No weight loss. Denies diarrhea, abdominal pain, hematemesis or blood in stool.  URINARY: No flank pain, dysuria or hematuria.  PERIPHERAL VASCULAR: No claudication or cyanosis.  MUSCULOSKELETAL: No joint stiffness or swelling. Denies back pain.  NEUROLOGIC: No history of seizures, paralysis, alteration of gait or coordination.    PE:   /76   Ht 5' 5.5" (1.664 m)   Wt 119.4 kg (263 lb 3.7 oz)   LMP 2018 (Exact Date)   Breastfeeding? No   BMI 43.14 kg/m²   APPEARANCE: Well nourished, well developed, in no acute distress.  NECK: Neck symmetric without masses or thyromegaly.  BREASTS: Symmetrical, no skin changes or visible lesions. No palpable masses, nipple discharge or adenopathy bilaterally.  ABDOMEN: Flat. Soft. No tenderness or masses. No hepatosplenomegaly. No hernias. No CVA tenderness.  VULVA: No lesions. Normal female genitalia.  URETHRAL MEATUS: Normal size and location, no lesions, no prolapse.  URETHRA: No masses, tenderness, prolapse or scarring.  VAGINA: Moist and well rugated, no discharge, no " significant cystocele or rectocele.  CERVIX: No lesions and discharge. PAP done.  UTERUS: Normal size, regular shape, mobile, non-tender, bladder base nontender.  ADNEXA: No masses, tenderness or CDS nodularity.  ANUS PERINEUM: Normal.    PROCEDURES:  Pap smear    Assessment:  Normal Gynecologic Exam    Plan:  Mammogram and Colonoscopy if indicated by current recommendations.  Return to clinic in one year or for any problems or complaints.  On ocp  Disc wt loss, diet, started keto

## 2019-08-01 NOTE — PROGRESS NOTES
Subjective:       Patient ID: Corrine Hicks is a 34 y.o. female.    Chief Complaint: Back Pain (worse at night) and Recurrent Skin Infections (boil under both arms-bra line)    Patient is a 34 y.o.female who presents today for back pain.      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 Location is armpits/ bra area    Review of Systems   Constitutional: Negative for appetite change, chills, diaphoresis and fever.   HENT: Negative for congestion, ear discharge, ear pain, postnasal drip, tinnitus, trouble swallowing and voice change.    Eyes: Negative for discharge, redness and itching.   Respiratory: Negative for cough, chest tightness, shortness of breath and wheezing.    Cardiovascular: Negative for chest pain, palpitations and leg swelling.   Gastrointestinal: Negative for abdominal pain, constipation, diarrhea, nausea and vomiting.   Endocrine: Negative for cold intolerance and heat intolerance.   Genitourinary: Negative for difficulty urinating, flank pain, hematuria and urgency.   Musculoskeletal: Positive for back pain. Negative for arthralgias, gait problem, myalgias and neck stiffness.   Skin: Negative for color change and rash.   Neurological: Negative for dizziness, seizures, syncope and headaches.   Hematological: Negative for adenopathy.   Psychiatric/Behavioral: Negative for agitation, behavioral problems, confusion and sleep disturbance.       Objective:      Physical Exam   Constitutional: She is oriented to person, place, and time. She  appears well-developed and well-nourished. No distress.   HENT:   Head: Normocephalic and atraumatic.   Right Ear: External ear normal.   Left Ear: External ear normal.   Nose: Nose normal.   Mouth/Throat: Oropharynx is clear and moist. No oropharyngeal exudate.   Eyes: Pupils are equal, round, and reactive to light. Conjunctivae and EOM are normal. Right eye exhibits no discharge. Left eye exhibits no discharge. No scleral icterus.   Neck: Neck supple. No JVD present. No tracheal deviation present. No thyromegaly present.   Cardiovascular: Normal rate, normal heart sounds and intact distal pulses. Exam reveals no gallop and no friction rub.   No murmur heard.  Pulmonary/Chest: Effort normal and breath sounds normal. No stridor. No respiratory distress. She has no wheezes. She has no rales. She exhibits no tenderness.   Abdominal: Soft. Bowel sounds are normal. She exhibits no distension. There is no tenderness. There is no rebound.   Musculoskeletal: She exhibits no edema.        Lumbar back: She exhibits decreased range of motion, tenderness and spasm.   Lymphadenopathy:     She has no cervical adenopathy.   Neurological: She is alert and oriented to person, place, and time.   Skin: Skin is warm and dry. No rash noted. She is not diaphoretic. No erythema.        Psychiatric: She has a normal mood and affect. Her behavior is normal.   Nursing note and vitals reviewed.      Assessment and Plan:       1. Back pain, unspecified back location, unspecified back pain laterality, unspecified chronicity  - get old xrays for chiropractor  - meloxicam (MOBIC) 15 MG tablet; Take 1 tablet (15 mg total) by mouth once daily. With food  Dispense: 10 tablet; Refill: 0  - methocarbamol (ROBAXIN) 750 MG Tab; Take 1 tablet (750 mg total) by mouth every evening. for 10 days  Dispense: 10 tablet; Refill: 0  - Ambulatory consult to Physical Therapy      2. Folliculitis    - doxycycline (VIBRAMYCIN) 100 MG Cap; Take 1 capsule (100 mg  total) by mouth 2 (two) times daily. for 7 days  Dispense: 14 capsule; Refill: 0  - mupirocin calcium 2% (BACTROBAN) 2 % cream; Apply topically 3 (three) times daily.  Dispense: 30 g; Refill: 1  - Ambulatory referral to Dermatology          No follow-ups on file.

## 2019-08-09 ENCOUNTER — TELEPHONE (OUTPATIENT)
Dept: INTERNAL MEDICINE | Facility: CLINIC | Age: 34
End: 2019-08-09

## 2019-08-09 ENCOUNTER — OFFICE VISIT (OUTPATIENT)
Dept: INTERNAL MEDICINE | Facility: CLINIC | Age: 34
End: 2019-08-09
Payer: COMMERCIAL

## 2019-08-09 VITALS
DIASTOLIC BLOOD PRESSURE: 78 MMHG | RESPIRATION RATE: 18 BRPM | SYSTOLIC BLOOD PRESSURE: 113 MMHG | TEMPERATURE: 99 F | WEIGHT: 264.75 LBS | HEART RATE: 76 BPM | BODY MASS INDEX: 42.55 KG/M2 | HEIGHT: 66 IN

## 2019-08-09 DIAGNOSIS — L73.9 FOLLICULITIS: ICD-10-CM

## 2019-08-09 DIAGNOSIS — M54.9 BACK PAIN, UNSPECIFIED BACK LOCATION, UNSPECIFIED BACK PAIN LATERALITY, UNSPECIFIED CHRONICITY: Primary | ICD-10-CM

## 2019-08-09 PROCEDURE — 3008F PR BODY MASS INDEX (BMI) DOCUMENTED: ICD-10-PCS | Mod: CPTII,S$GLB,, | Performed by: INTERNAL MEDICINE

## 2019-08-09 PROCEDURE — 99214 PR OFFICE/OUTPT VISIT, EST, LEVL IV, 30-39 MIN: ICD-10-PCS | Mod: S$GLB,,, | Performed by: INTERNAL MEDICINE

## 2019-08-09 PROCEDURE — 99214 OFFICE O/P EST MOD 30 MIN: CPT | Mod: S$GLB,,, | Performed by: INTERNAL MEDICINE

## 2019-08-09 PROCEDURE — 3078F PR MOST RECENT DIASTOLIC BLOOD PRESSURE < 80 MM HG: ICD-10-PCS | Mod: CPTII,S$GLB,, | Performed by: INTERNAL MEDICINE

## 2019-08-09 PROCEDURE — 3008F BODY MASS INDEX DOCD: CPT | Mod: CPTII,S$GLB,, | Performed by: INTERNAL MEDICINE

## 2019-08-09 PROCEDURE — 99999 PR PBB SHADOW E&M-EST. PATIENT-LVL IV: ICD-10-PCS | Mod: PBBFAC,,, | Performed by: INTERNAL MEDICINE

## 2019-08-09 PROCEDURE — 99999 PR PBB SHADOW E&M-EST. PATIENT-LVL IV: CPT | Mod: PBBFAC,,, | Performed by: INTERNAL MEDICINE

## 2019-08-09 PROCEDURE — 3078F DIAST BP <80 MM HG: CPT | Mod: CPTII,S$GLB,, | Performed by: INTERNAL MEDICINE

## 2019-08-09 PROCEDURE — 3074F SYST BP LT 130 MM HG: CPT | Mod: CPTII,S$GLB,, | Performed by: INTERNAL MEDICINE

## 2019-08-09 PROCEDURE — 3074F PR MOST RECENT SYSTOLIC BLOOD PRESSURE < 130 MM HG: ICD-10-PCS | Mod: CPTII,S$GLB,, | Performed by: INTERNAL MEDICINE

## 2019-08-09 RX ORDER — METHOCARBAMOL 750 MG/1
750 TABLET, FILM COATED ORAL NIGHTLY
Qty: 10 TABLET | Refills: 0 | Status: SHIPPED | OUTPATIENT
Start: 2019-08-09 | End: 2019-08-19

## 2019-08-09 RX ORDER — MELOXICAM 15 MG/1
15 TABLET ORAL DAILY
Qty: 10 TABLET | Refills: 0 | Status: SHIPPED | OUTPATIENT
Start: 2019-08-09 | End: 2020-04-24

## 2019-08-09 RX ORDER — MUPIROCIN 20 MG/G
OINTMENT TOPICAL 3 TIMES DAILY
Qty: 30 G | Refills: 1 | Status: SHIPPED | OUTPATIENT
Start: 2019-08-09 | End: 2021-07-27

## 2019-08-09 RX ORDER — DOXYCYCLINE 100 MG/1
100 CAPSULE ORAL 2 TIMES DAILY
Qty: 14 CAPSULE | Refills: 0 | Status: SHIPPED | OUTPATIENT
Start: 2019-08-09 | End: 2019-08-16

## 2019-08-09 RX ORDER — MUPIROCIN CALCIUM 20 MG/G
CREAM TOPICAL 3 TIMES DAILY
Qty: 30 G | Refills: 1 | Status: SHIPPED | OUTPATIENT
Start: 2019-08-09 | End: 2021-07-27

## 2019-08-09 RX ORDER — PHENTERMINE HYDROCHLORIDE 37.5 MG/1
37.5 TABLET ORAL DAILY
Refills: 0 | COMMUNITY
Start: 2019-06-28 | End: 2019-09-12

## 2019-08-09 NOTE — MEDICAL/APP STUDENT
Subjective:       Patient ID: Corrine Hicks is a 34 y.o. female.    Chief Complaint: Back Pain (worse at night) and Recurrent Skin Infections (boil under both arms-bra line)    HPI: Corrine Hicks is a 35 yo F who presents for evaluation of back pain. 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 She describes paraspinal muscle tightness that is worst when she is lying down to sleep and worse with activity. The pain does not radiate do the legs, except for one episode when she was picking up her child. She denies bladder or bowel symptoms. She is only able to sleep 3-4 hours per night due to the pain. She takes 1600mg or more of ibuprofen per day with little relief. She reports that ice helps, she also had short prescription for robaxin and toradol that provided some relief. She also reports folliculitis along bra line. She denies fevers, chills, chest pain, SOB, abdominal pain.    Past Medical History:   Diagnosis Date    Coronary artery disease     large heart noted on CXR , No problems    Diabetes mellitus     previous and current pregnancy, diet controlled.    Gestational diabetes     Hypertension     preEclampsia    Pregnancy     x 2; preecalmpsia for second one     Past Surgical History:   Procedure Laterality Date     SECTION      DELIVERY- SECTION N/A 2017    Performed by [facility] A. Wiedemann, MD at [facility]    TONSILLECTOMY       Outpatient Medications as of 2019   Medication Sig Dispense Refill    desogestrel-ethinyl estradiol (ISIBLOOM) 0.15-0.03 mg per tablet Take 1 tablet by mouth once daily. 84 tablet 3    phentermine (ADIPEX-P) 37.5 mg tablet Take 37.5 mg by mouth once daily.  0     No current facility-administered medications on file as of 2019.    Review of patient's allergies indicates:  No Known Allergies    Review of Systems   Constitutional: Negative for appetite change, chills and fever.   Respiratory:  Negative for cough and shortness of breath.    Cardiovascular: Negative for chest pain, palpitations and leg swelling.   Gastrointestinal: Negative for abdominal distention, abdominal pain, constipation, diarrhea, nausea and vomiting.   Genitourinary: Negative for difficulty urinating.   Musculoskeletal: Positive for back pain. Negative for neck pain.   Neurological: Negative for dizziness, weakness, numbness and headaches.   Psychiatric/Behavioral: Negative.        Objective:       Vitals:    08/09/19 0921   BP: 113/78   Pulse: 76   Resp: 18   Temp: 98.5 °F (36.9 °C)       Physical Exam   Constitutional: She is oriented to person, place, and time. She appears well-developed and well-nourished.   HENT:   Head: Normocephalic and atraumatic.   Neck: Normal range of motion. Neck supple.   Cardiovascular: Normal rate, regular rhythm, normal heart sounds and intact distal pulses.   Pulmonary/Chest: Effort normal and breath sounds normal.   Abdominal: Soft. Bowel sounds are normal. She exhibits no distension. There is no tenderness.   Musculoskeletal: Normal range of motion.   Mid-thoracic to lumbar back pain in paraspinal muscles, nontender to palpation, gait normal   Neurological: She is alert and oriented to person, place, and time. She has normal strength. No sensory deficit.   Skin: Rash noted.   Along bra line   Psychiatric: She has a normal mood and affect. Her behavior is normal.       Assessment:       1. Back pain, unspecified back location, unspecified back pain laterality, unspecified chronicity     2. Folliculitis     Plan:       1. Recommended PT, Prescribed meloxicam in the AM, robaxin in PM. Previously got Xrays at chiropractor, plan to review when images received  2. Referral to Derm, prescribed doxy and bactroban

## 2019-08-09 NOTE — TELEPHONE ENCOUNTER
Fax came from [facility] stating mupirocin cream not covered and wanted ok to change to ointment.    I left msg giving verbal ok.    Please sign rx to show correction.  I pended it.  Thanks aramis

## 2019-08-15 ENCOUNTER — PATIENT MESSAGE (OUTPATIENT)
Dept: INTERNAL MEDICINE | Facility: CLINIC | Age: 34
End: 2019-08-15

## 2019-08-20 ENCOUNTER — PATIENT MESSAGE (OUTPATIENT)
Dept: INTERNAL MEDICINE | Facility: CLINIC | Age: 34
End: 2019-08-20

## 2019-09-10 ENCOUNTER — CLINICAL SUPPORT (OUTPATIENT)
Dept: REHABILITATION | Facility: HOSPITAL | Age: 34
End: 2019-09-10
Payer: COMMERCIAL

## 2019-09-10 ENCOUNTER — PATIENT OUTREACH (OUTPATIENT)
Dept: ADMINISTRATIVE | Facility: OTHER | Age: 34
End: 2019-09-10

## 2019-09-10 DIAGNOSIS — G89.29 CHRONIC RIGHT-SIDED LOW BACK PAIN WITHOUT SCIATICA: ICD-10-CM

## 2019-09-10 DIAGNOSIS — M54.50 CHRONIC RIGHT-SIDED LOW BACK PAIN WITHOUT SCIATICA: ICD-10-CM

## 2019-09-10 PROCEDURE — 97161 PT EVAL LOW COMPLEX 20 MIN: CPT | Mod: PN

## 2019-09-10 PROCEDURE — 97140 MANUAL THERAPY 1/> REGIONS: CPT | Mod: PN

## 2019-09-12 ENCOUNTER — OFFICE VISIT (OUTPATIENT)
Dept: CARDIOLOGY | Facility: CLINIC | Age: 34
End: 2019-09-12
Payer: COMMERCIAL

## 2019-09-12 VITALS
WEIGHT: 267 LBS | DIASTOLIC BLOOD PRESSURE: 87 MMHG | BODY MASS INDEX: 42.91 KG/M2 | HEIGHT: 66 IN | OXYGEN SATURATION: 97 % | HEART RATE: 83 BPM | SYSTOLIC BLOOD PRESSURE: 128 MMHG

## 2019-09-12 DIAGNOSIS — Z01.818 PRE-OP EVALUATION: ICD-10-CM

## 2019-09-12 DIAGNOSIS — Z71.6 ENCOUNTER FOR SMOKING CESSATION COUNSELING: Primary | ICD-10-CM

## 2019-09-12 PROBLEM — M54.50 CHRONIC RIGHT-SIDED LOW BACK PAIN WITHOUT SCIATICA: Status: ACTIVE | Noted: 2019-09-12

## 2019-09-12 PROBLEM — G89.29 CHRONIC RIGHT-SIDED LOW BACK PAIN WITHOUT SCIATICA: Status: ACTIVE | Noted: 2019-09-12

## 2019-09-12 PROCEDURE — 3074F PR MOST RECENT SYSTOLIC BLOOD PRESSURE < 130 MM HG: ICD-10-PCS | Mod: CPTII,S$GLB,, | Performed by: INTERNAL MEDICINE

## 2019-09-12 PROCEDURE — 99999 PR PBB SHADOW E&M-EST. PATIENT-LVL III: CPT | Mod: PBBFAC,,, | Performed by: INTERNAL MEDICINE

## 2019-09-12 PROCEDURE — 3079F DIAST BP 80-89 MM HG: CPT | Mod: CPTII,S$GLB,, | Performed by: INTERNAL MEDICINE

## 2019-09-12 PROCEDURE — 93000 ELECTROCARDIOGRAM COMPLETE: CPT | Mod: S$GLB,,, | Performed by: INTERNAL MEDICINE

## 2019-09-12 PROCEDURE — 99203 OFFICE O/P NEW LOW 30 MIN: CPT | Mod: 25,S$GLB,, | Performed by: INTERNAL MEDICINE

## 2019-09-12 PROCEDURE — 3008F BODY MASS INDEX DOCD: CPT | Mod: CPTII,S$GLB,, | Performed by: INTERNAL MEDICINE

## 2019-09-12 PROCEDURE — 99999 PR PBB SHADOW E&M-EST. PATIENT-LVL III: ICD-10-PCS | Mod: PBBFAC,,, | Performed by: INTERNAL MEDICINE

## 2019-09-12 PROCEDURE — 3079F PR MOST RECENT DIASTOLIC BLOOD PRESSURE 80-89 MM HG: ICD-10-PCS | Mod: CPTII,S$GLB,, | Performed by: INTERNAL MEDICINE

## 2019-09-12 PROCEDURE — 3074F SYST BP LT 130 MM HG: CPT | Mod: CPTII,S$GLB,, | Performed by: INTERNAL MEDICINE

## 2019-09-12 PROCEDURE — 99203 PR OFFICE/OUTPT VISIT, NEW, LEVL III, 30-44 MIN: ICD-10-PCS | Mod: 25,S$GLB,, | Performed by: INTERNAL MEDICINE

## 2019-09-12 PROCEDURE — 93000 EKG 12-LEAD: ICD-10-PCS | Mod: S$GLB,,, | Performed by: INTERNAL MEDICINE

## 2019-09-12 PROCEDURE — 3008F PR BODY MASS INDEX (BMI) DOCUMENTED: ICD-10-PCS | Mod: CPTII,S$GLB,, | Performed by: INTERNAL MEDICINE

## 2019-09-12 RX ORDER — HYDROCODONE BITARTRATE AND ACETAMINOPHEN 5; 325 MG/1; MG/1
TABLET ORAL
Refills: 0 | COMMUNITY
Start: 2019-08-26 | End: 2020-09-22

## 2019-09-12 RX ORDER — GABAPENTIN 300 MG/1
CAPSULE ORAL
Refills: 3 | COMMUNITY
Start: 2019-08-26 | End: 2021-07-27

## 2019-09-12 RX ORDER — METHOCARBAMOL 750 MG/1
TABLET, FILM COATED ORAL
Refills: 1 | COMMUNITY
Start: 2019-08-26 | End: 2020-04-24 | Stop reason: SDUPTHER

## 2019-09-12 NOTE — PROGRESS NOTES
Subjective:      Patient ID: Corrine Hicks is a 34 y.o. female.    Chief Complaint: Pre-op Exam (bariatric surgery )    HPI:  Pt requests cardiac clearance for bariatric surgery.    Pt had hypertension during pregnancy.    Pt feels slightly short of breath when walking fast.    Review of Systems   Cardiovascular: Positive for dyspnea on exertion (Mild) and leg swelling (Only during pregnancy). Negative for chest pain, claudication, irregular heartbeat, near-syncope, orthopnea, palpitations and syncope.      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    Although pt was once prescribed antihypertensive meds her BP is now normal off meds.    Pt has been taking Mobic and ibuprofen for back issues and sciatica.      Past Medical History:   Diagnosis Date    Coronary artery disease     large heart noted on CXR , No problems    Diabetes mellitus     previous and current pregnancy, diet controlled. Gestational DM only    Gestational diabetes     Hypertension     preEclampsia    Pregnancy     x 2; preecalmpsia for second one        Past Surgical History:   Procedure Laterality Date     SECTION      DELIVERY- SECTION N/A 2017    Performed by Michael A. Wiedemann, [facility] at [facility] L&D    TONSILLECTOMY         Family History   Problem Relation Age of Onset    Diabetes Mother     Hypertension Father     Diabetes Maternal Grandmother        Social History     Socioeconomic History    Marital status: Single     Spouse name: Not on file    Number of children: Not on file    Years of education: Not on file    Highest education level: Not on file   Occupational History    Not on file   Social Needs    Financial resource strain: Not on file    Food insecurity:     Worry: Not on file     Inability: Not on file    Transportation needs:     Medical: Not on file     Non-medical: Not on file   Tobacco Use    Smoking status: Current Every Day Smoker     Packs/day: 0.50     Types: Cigarettes     "Smokeless tobacco: Never Used   Substance and Sexual Activity    Alcohol use: No    Drug use: No    Sexual activity: Yes   Lifestyle    Physical activity:     Days per week: Not on file     Minutes per session: Not on file    Stress: Not on file   Relationships    Social connections:     Talks on phone: Not on file     Gets together: Not on file     Attends [employer] service: Not on file     Active member of club or organization: Not on file     Attends meetings of clubs or organizations: Not on file     Relationship status: Not on file   Other Topics Concern    Not on file   Social History Narrative    Not on file       Current Outpatient Medications on File Prior to Visit   Medication Sig Dispense Refill    desogestrel-ethinyl estradiol (ISIBLOOM) 0.15-0.03 mg per tablet Take 1 tablet by mouth once daily. 84 tablet 3    gabapentin (NEURONTIN) 300 MG capsule TK 1 C PO TID PRF NERVE PAIN  3    HYDROcodone-acetaminophen (NORCO) 5-325 mg per tablet TK 1 T PO QID PRF SEVERE PAIN  0    meloxicam (MOBIC) 15 MG tablet Take 1 tablet (15 mg total) by mouth once daily. With food 10 tablet 0    methocarbamol (ROBAXIN) 750 MG Tab TK 1 T PO Q 4 H PRF MUSCLE SPASM  1    mupirocin (BACTROBAN) 2 % ointment Apply topically 3 (three) times daily. 30 g 1    mupirocin calcium 2% (BACTROBAN) 2 % cream Apply topically 3 (three) times daily. 30 g 1    [DISCONTINUED] phentermine (ADIPEX-P) 37.5 mg tablet Take 37.5 mg by mouth once daily.  0     No current facility-administered medications on file prior to visit.        Review of patient's allergies indicates:  No Known Allergies  Objective:     Vitals:    09/12/19 0810   BP: 128/87   BP Location: Left arm   Patient Position: Sitting   BP Method: Large (Automatic)   Pulse: 83   SpO2: 97%   Weight: 121.1 kg (266 lb 15.6 oz)   Height: 5' 5.5" (1.664 m)        Physical Exam   Constitutional: She is oriented to person, place, and time. She appears well-developed and " well-nourished.   Eyes: No scleral icterus.   Neck: No JVD present. Carotid bruit is not present.   Cardiovascular: Normal rate and regular rhythm. Exam reveals no gallop.   No murmur heard.  Pulses:       Posterior tibial pulses are 2+ on the right side, and 2+ on the left side.   Pulmonary/Chest: Breath sounds normal.   Abdominal: Soft. She exhibits no abdominal bruit, no pulsatile midline mass and no mass. There is no hepatosplenomegaly. There is no tenderness.   Musculoskeletal: She exhibits no edema.   Neurological: She is alert and oriented to person, place, and time.   Skin: Skin is warm and dry.   Psychiatric: She has a normal mood and affect. Her behavior is normal. Judgment and thought content normal.   Vitals reviewed.     CTA 8/17 showed cardiomegaly and CHF.    Echocardiogram 8/17 showed normal LVID and LVEF    Lab last November showed fasting hyperglycemia, HDL 40,     ECG: NSR, WNL    Assessment:     Normal cardiac exam    Hx of hypertension and fluid overload during last pregnancy    Plan:   Corrine was seen today for pre-op exam.    Diagnoses and all orders for this visit:    Encounter for smoking cessation counseling    Other orders  -     IN OFFICE EKG 12-LEAD (to Muse)       Pt is medically stable for bariatric surgery    Discussed exercise and Weight Watcher's diet    Smoking cessation counseled    No follow-ups on file.

## 2019-09-16 NOTE — PLAN OF CARE
[facility]  Physical Therapy Initial Evaluation    Name: Corrine Hicks  Clinic Number: [id number]    Therapy Diagnosis:   Encounter Diagnosis   Name Primary?    Chronic right-sided low back pain without sciatica      Physician: April Chacon DO    Physician Orders: PT Eval and Treat   Medical Diagnosis from Referral: Back pain, unspecified back location, unspecified back pain laterality, unspecified chronicity  Evaluation Date: 9/10/2019  Authorization Period Expiration: 2019  Plan of Care Expiration: 9/10/2019 to 2019  Visit # / Visits authorized:   FOTO: 1/10    Time In: 0818  Time Out: 0900  Total Billable Time: 42 minutes (LCE-1, MT-1)    Precautions: Standard    Subjective     Date of onset: 2018    History of current condition - Corrine reports: 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 Experienced an exacerbation in symptoms a couple of months ago. Saw MD and was given medication and was feeling better until 2 weeks ago she experienced pain in her L buttocks. Difficulty ascending/descending stairs. Difficulty lifting and carrying child. Will be having bariatric surgery by the end of the year. Back is painful when stationary, L buttock hurts with movement.     Medical History:   Past Medical History:   Diagnosis Date    Coronary artery disease     large heart noted on CXR , No problems    Diabetes mellitus     previous and current pregnancy, diet controlled. Gestational DM only    Gestational diabetes     Hypertension     preEclampsia    Pregnancy     x 2; preecalmpsia for second one       Surgical History:   Corrine Hicks  has a past surgical history that includes Tonsillectomy and  section.    Medications:   Corrine has a current medication list which includes the following prescription(s): desogestrel-ethinyl estradiol, gabapentin, hydrocodone-acetaminophen, meloxicam,  methocarbamol, mupirocin, and mupirocin calcium 2%.    Allergies:   Review of patient's allergies indicates:  No Known Allergies     Imaging, none:     Prior Therapy: No  Social History:  lives with their family  Occupation: ER nurse  Prior Level of Function: independent with ADLs, lifting, carrying, ascending/descending stairs  Current Level of Function: pain and difficulty with ADLs, lifting, carrying, ascending/descending stairs     Pain:  Current 5/10, worst 7/10, best 2/10   Location: right back, L buttock  Description: R back = dull ache; L buttock = radiating, shooting  Aggravating Factors: Sitting, Walking and Getting out of bed/chair  Easing Factors: pain medication    Pts goals: decrease pain, return to PLOF, return to work full capacity    Objective     Posture: sitting: slumped with Forward head and rounded shoulders    Palpation: tenderness to R lumbar paraspinals, gluteus medius and minimus, piriformis    Range of Motion/Strength:       Thoracolumbar Pain/Dysfunction with Movement   Flexion No loss   Extension Min loss; pain in R low back   Right side bending No loss   Left side bending No loss   Right rotation Min loss   Left rotation Min loss; pain in R low back       L/E MMT Right Left Pain/Dysfunction with Movement   Hip Flexion 5/5 4-/5* Pain in L buttock, groin   Hip Extension 4-/5 4-/5    Hip Abduction 4/5 4/5    Hip IR 4+/5 4+/5    Hip ER 4+/5 4/5    Knee Flexion 4+/5 4-/5    Knee Extension 4+/5 4/5    Ankle DF 4+/5 4+/5    Ankle PF 4/5 4/5          Joint Mobility:   - Lumbar: hypomobility with central posterior to anterior mobilizations to L3-5      Flexibility:   Hamstring: moderately decreased B      Gait Analysis:Without AD Deviations: antalgic        CMS Impairment/Limitation/Restriction for FOTO Lumbar Spine Survey    Therapist reviewed FOTO scores for Corrine Hicks on 9/10/2019.   FOTO documents entered into [employer] - see Media section.    Limitation Score: 43%       TREATMENT      Treatment Time In: 0850  Treatment Time Out: 0900  Total Treatment time separate from Evaluation: 10 minutes    Palpation Assessment to determine the necessity for Functional Dry Needling  Tenderness to R lumbar paraspinals, gluteus medius and minimus, piriformis.   Patient provided written and verbal consent to Functional Dry Needling   Corrine received the following manual therapy techniques:  Functional Dry Needling to R lumbar paraspinals, gluteus medius and minimus, piriformis with estim          Home Exercises and Patient Education Provided    Education provided:   - role of therapy  - functional dry needling    Written Handout on response to FDN provided: yes    Corrine demonstrated good  understanding of the education provided.         Assessment     Corrine is a 34 y.o. female referred to outpatient Physical Therapy with a medical diagnosis of Back pain, unspecified back location, unspecified back pain laterality, unspecified chronicity. Pt presents to PT with pain, decreased back - lumbar ROM, decreased strength and flexibility, poor posture, impaired balance and gait, and functional deficits with lifting, carrying, standing and walking.      Pt prognosis is Good.   Pt will benefit from skilled outpatient Physical Therapy to address the deficits stated above and in the chart below, provide pt/family education, and to maximize pt's level of independence.     Plan of care discussed with patient: Yes  Pt's spiritual, cultural and educational needs considered and pt agreeable to plan of care and goals as stated below:     Anticipated Barriers for therapy: none      Medical Necessity is demonstrated by the following  History  Co-morbidities and personal factors that may impact the plan of care Co-morbidities:   -Back pain, BMI over 30, Sleep dysfunction    Personal Factors:   no deficits     high   Examination  Body Structures and Functions, activity limitations and participation restrictions that may impact  the plan of care Body Regions:   head  neck  back  lower extremities  upper extremities  trunk    Body Systems:    gross symmetry  ROM  strength  gross coordinated movement  balance  gait  transfers    Participation Restrictions:   Playing with her child    Activity limitations:   Learning and applying knowledge  no deficits    General Tasks and Commands  no deficits    Communication  no deficits    Mobility  lifting and carrying objects  walking    Self care  no deficits    Domestic Life  shopping  cooking  doing house work (cleaning house, washing dishes, laundry)    Interactions/Relationships  no deficits    Life Areas  no deficits    Community and Social Life  community life  recreation and leisure         high   Clinical Presentation stable and uncomplicated low   Decision Making/ Complexity Score: low         Goals:    Short Term Goals (4 Weeks):  1. Pt will be compliant with HEP to supplement PT in decreasing pain with functional mobility.  2. Pt will perform pallof press with good control to demonstrate improved core strength.  3. Pt will improve lumbar ROM to </=minimal loss in all planes to improve functional mobility.  4. Pt will improve impaired LE MMTs to >/=4/5 to improve strength for functional tasks.    Long Term Goals (8 Weeks):   1. Pt will improve FOTO score to </= 30% limited to decrease perceived limitation with maintaining/changing body position.   2. Pt will perform 20# crate carry x 50' with good control to demonstrate improved core strength.  3. Pt will improve impaired LE MMTs to >/=4+/5 to improve strength for functional tasks.  4. Pt will report no pain during lumbar ROM to promote functional mobility.       Plan     Plan of care Certification: 9/10/2019 to 11/8/2019.    Outpatient Physical Therapy 2 times weekly for 8 weeks to include the following interventions: Gait Training, Manual Therapy, Moist Heat/ Ice, Neuromuscular Re-ed, Patient Education, Therapeutic Activites and Therapeutic  Exercise.     Karime Snell, PT, DPT

## 2019-09-17 ENCOUNTER — CLINICAL SUPPORT (OUTPATIENT)
Dept: REHABILITATION | Facility: HOSPITAL | Age: 34
End: 2019-09-17
Payer: COMMERCIAL

## 2019-09-17 DIAGNOSIS — G89.29 CHRONIC RIGHT-SIDED LOW BACK PAIN WITHOUT SCIATICA: ICD-10-CM

## 2019-09-17 DIAGNOSIS — M54.50 CHRONIC RIGHT-SIDED LOW BACK PAIN WITHOUT SCIATICA: ICD-10-CM

## 2019-09-17 PROCEDURE — 97140 MANUAL THERAPY 1/> REGIONS: CPT | Mod: PN

## 2019-09-17 PROCEDURE — 97110 THERAPEUTIC EXERCISES: CPT | Mod: PN

## 2019-09-17 NOTE — PROGRESS NOTES
"                            Physical Therapy Daily Treatment Note     Name: Corrine Ramires [facility] Number: [id number]    Therapy Diagnosis:   Encounter Diagnosis   Name Primary?    Chronic right-sided low back pain without sciatica      Physician: April Chacon DO    Visit Date: 9/17/2019    Physician Orders: PT Eval and Treat   Medical Diagnosis from Referral: Back pain, unspecified back location, unspecified back pain laterality, unspecified chronicity  Evaluation Date: 9/10/2019  Authorization Period Expiration: 12/31/2019  Plan of Care Expiration: 9/10/2019 to 11/8/2019  Visit # / Visits authorized: 2/ 60  FOTO: 2/10     Time In: 0701  Time Out: 0750  Total Billable Time: 49 minutes (MT-2, TE-2)     Precautions: Standard    Subjective     Pt reports: she is doing better. No pain in her buttocks or down her R leg.   She was compliant with home exercise program.  Response to previous treatment: improved pain  Functional change: standing and ambulating with less pain    Pain: 2/10  Location: right back      Objective     Corrine received therapeutic exercises to develop strength, endurance and core stabilization for 24 minutes including:    Prone lying  5'  Prone press ups  2x10  Posterior pelvic tilt 20x5"  Bridges 2x10    Palpation Assessment to determine the necessity for Functional Dry Needling  Tenderness to R lumbar paraspinals for 25 minutes  Patient provided written and verbal consent to Functional Dry Needling   Corrine received the following manual therapy techniques:  Functional Dry Needling to R lumbar paraspinals L2-5  with estim           Home Exercises Provided and Patient Education Provided     Education provided:   - continue HEP    Written Home Exercises Provided: yes.  Exercises were reviewed and Corrinewas able to demonstrate them prior to the end of the session.  Corrine demonstrated good  understanding of the education provided.     See EMR under Patient Instructions for exercises " provided 9/10/19.      Assessment     Patient tolerated treatment well with reports of 0/10 pain at the end of the treatment session. Patient demonstrated appropriate response to Functional Dry Needling.  Good rhythmical contractions observed with estim to treated muscle groups        Corrine is progressing well towards her goals.   Pt prognosis is Good.     Pt will continue to benefit from skilled outpatient physical therapy to address the deficits listed in the problem list box on initial evaluation, provide pt/family education and to maximize pt's level of independence in the home and community environment.     Pt's spiritual, cultural and educational needs considered and pt agreeable to plan of care and goals.    Anticipated barriers to physical therapy: none    Goals:     Short Term Goals (4 Weeks):  1. Pt will be compliant with HEP to supplement PT in decreasing pain with functional mobility. PROGRESSING, NOT MET 9/17/2019  2. Pt will perform pallof press with good control to demonstrate improved core strength. PROGRESSING, NOT MET 9/17/2019  3. Pt will improve lumbar ROM to </=minimal loss in all planes to improve functional mobility. PROGRESSING, NOT MET 9/17/2019  4. Pt will improve impaired LE MMTs to >/=4/5 to improve strength for functional tasks. PROGRESSING, NOT MET 9/17/2019     Long Term Goals (8 Weeks):   1. Pt will improve FOTO score to </= 30% limited to decrease perceived limitation with maintaining/changing body position. PROGRESSING, NOT MET 9/17/2019  2. Pt will perform 20# crate carry x 50' with good control to demonstrate improved core strength. PROGRESSING, NOT MET 9/17/2019  3. Pt will improve impaired LE MMTs to >/=4+/5 to improve strength for functional tasks. PROGRESSING, NOT MET 9/17/2019  4. Pt will report no pain during lumbar ROM to promote functional mobility. PROGRESSING, NOT MET 9/17/2019    Plan     Continue plan of care. Monitor response to Functional Dry Needling. Continue with  Functional Dry Needling in POC as appropriate    Karime Snell, PT, DPT

## 2019-09-24 ENCOUNTER — TELEPHONE (OUTPATIENT)
Dept: CARDIOLOGY | Facility: CLINIC | Age: 34
End: 2019-09-24

## 2019-09-24 NOTE — TELEPHONE ENCOUNTER
Clearance note faxed.     ----- Message from Yoana Ramos sent at 9/24/2019  1:52 PM CDT -----  Contact: [phone]/self  She is at her doctor's office and there is no paper work cardiac surgery clearance sent. Please fax to  [facility]  [phone]    thanks

## 2019-09-30 NOTE — PROGRESS NOTES
Physical Therapy Daily Treatment Note     Name: Corrine Hicks  [facility] Number: [id number]    Therapy Diagnosis:   Encounter Diagnosis   Name Primary?    Chronic right-sided low back pain without sciatica      Physician: April Chacon DO    Visit Date: 10/1/2019    Physician Orders: PT Eval and Treat   Medical Diagnosis from Referral: Back pain, unspecified back location, unspecified back pain laterality, unspecified chronicity  Evaluation Date: 9/10/2019  Authorization Period Expiration: 12/31/2019  Plan of Care Expiration: 9/10/2019 to 11/8/2019  Visit # / Visits authorized: 3/ 60  FOTO: 3/10     Time In: 0704  Time Out: 0757  Total Billable Time: 52 minutes (MT-1, TE-3)      Precautions: Standard    Subjective     See Updated POC in Treatment section    Objective     Corrine received objective measures taken x 38' (see below)    Palpation Assessment to determine the necessity for Functional Dry Needling  Tenderness to L vastus lateralis and medialis, fat pad for 15 minutes  Patient provided written and verbal consent to Functional Dry Needling   Corrine received the following manual therapy techniques:  Functional Dry Needling to L vastus lateralis and medialis, fat pad with estim    Kinesiotaping for mechanical correction L patella           Home Exercises Provided and Patient Education Provided     Education provided:   - kinesiotape wear  - PT will contact MD regarding order for therapy for her knee      Assessment     See Updated POC in Treatment section    Plan     See Updated POC in Treatment section    Karime Snell, PT, DPT

## 2019-10-01 ENCOUNTER — CLINICAL SUPPORT (OUTPATIENT)
Dept: REHABILITATION | Facility: HOSPITAL | Age: 34
End: 2019-10-01
Payer: COMMERCIAL

## 2019-10-01 DIAGNOSIS — M54.50 CHRONIC RIGHT-SIDED LOW BACK PAIN WITHOUT SCIATICA: ICD-10-CM

## 2019-10-01 DIAGNOSIS — G89.29 CHRONIC RIGHT-SIDED LOW BACK PAIN WITHOUT SCIATICA: ICD-10-CM

## 2019-10-01 PROCEDURE — 97110 THERAPEUTIC EXERCISES: CPT | Mod: PN

## 2019-10-01 PROCEDURE — 97140 MANUAL THERAPY 1/> REGIONS: CPT | Mod: PN

## 2019-10-01 NOTE — PLAN OF CARE
Outpatient Therapy Updated Plan of Care     Visit Date: 10/1/2019    Name: Corrine Hicks  [facility] Number: [id number]    Therapy Diagnosis:   Encounter Diagnosis   Name Primary?    Chronic right-sided low back pain without sciatica      Physician: April Chacon DO    Physician Orders: PT Eval and Treat   Medical Diagnosis from Referral: Back pain, unspecified back location, unspecified back pain laterality, unspecified chronicity  Evaluation Date: 9/10/2019  Authorization Period Expiration: 12/31/2019  Plan of Care Expiration: 9/10/2019 to 11/8/2019  Visit # / Visits authorized: 3/ 60    Precautions: Standard  Functional Level Prior to Evaluation:  independent with ADLs, lifting, carrying, ascending/descending stairs    Subjective     Update:     Pt reports: she feel 1 week ago while on vacation and dislocated her patella. This is the reason she cancelled her therapy appointment last week. She had and MRI of her knee and has been resting since the fall. Her knee is beginning to feel better, however she is unable to fully bend or straighten it due to pain. Her back pain however is improving.   She was compliant with home exercise program.  Response to previous treatment: improved pain  Functional change: standing and ambulating with less pain    Pain: 5/10  Location: left knee    Objective     Update:         Posture: sitting: slumped with Forward head and rounded shoulders; L hip flexed, ABducted, and externally rotated; L knee in slight flexion     Palpation: tenderness to L medial femoral condyle, medial/lateral fat pad, medial border of patella     Range of Motion/Strength:         Thoracolumbar Pain/Dysfunction with Movement   Flexion No loss   Extension No loss; slight pain in R low back   Right side bending No loss   Left side bending No loss   Right rotation No loss   Left rotation No loss; slight pain in R low back         L/E MMT Right Left Pain/Dysfunction with Movement   Hip Flexion 5/5 4-/5  Pain in lateral L knee, medial lower leg   Hip Extension 4+/5 4-/5     Hip Abduction 4+/5 4+/5     Hip IR 4+/5 4+/5     Hip ER 4+/5 4/5     Knee Flexion 5/5 4-/5*  Pain in L hamstring   Knee Extension 5/5 4-/5  pain surrounding L knee   Ankle DF 4+/5 4+/5     Ankle PF 4/5 4/5        L Knee AROM: -10-0-80    Special Test:  Apprehension: positive  Patellar grind: positive    Joint Mobility:   - Patellar: L lateral tracking        Flexibility:   Hamstring: moderately decreased B        Gait Analysis:Without AD Deviations: antalgic           CMS Impairment/Limitation/Restriction for FOTO Lumbar Spine Survey     Therapist reviewed FOTO scores for Corrine Hicks on 10/1/2019.   FOTO documents entered into [email or web address] - see Media section.     Limitation Score: 30%    FOTO Knee Survey    Limitation Score: 53%          Assessment     Update: Since beginning PT, pt has been seen 3 times since initial evaluation on 9/10/2019. Overall, she has made good, steady progress with her PT treatments focus on her back and has worked hard towards all of hher PT goals as evidenced by subjective and objective improvements. However, in the last week she had a fall resulting in the dislocation of her L patella. She currently presents with deficits with lumbar ROM, LE strength, knee ROM, balance and gait and will benefit from skilled PT consisting of manual therapy including STM/MFR left  knee, patellar mobs, knee flex/ext mobs/stretching; therapeutic exercise including LE strengthening/stretching, postural education, balance and gait training, and modalities prn   to address remaining limitations and increase functional mobility.    Previous Short Term Goals Status:       Short Term Goals (4 Weeks):  1. Pt will be compliant with HEP to supplement PT in decreasing pain with functional mobility. MET 10/1/2019  2. Pt will perform pallof press with good control to demonstrate improved core strength. PROGRESSING, NOT MET 10/1/2019  3. Pt will  improve lumbar ROM to </=minimal loss in all planes to improve functional mobility. MET 10/1/2019  4. Pt will improve impaired LE MMTs to >/=4/5 to improve strength for functional tasks. PROGRESSING, NOT MET 10/1/2019       New Short Term Goals Status:   Continue with goals 2,4  Long Term Goal Status:   modified:  Continue with goals 1-4, add goal 5. Patient will improve L knee AROM to 0-115 degrees to improve gait.      Long Term Goals (8 Weeks):   1. Pt will improve FOTO score to </= 30% limited to decrease perceived limitation with maintaining/changing body position. MET 10/1/2019  2. Pt will perform 20# crate carry x 50' with good control to demonstrate improved core strength. PROGRESSING, NOT MET 10/1/2019  3. Pt will improve impaired LE MMTs to >/=4+/5 to improve strength for functional tasks. PROGRESSING, NOT MET 10/1/2019  4. Pt will report no pain during lumbar ROM to promote functional mobility. PROGRESSING, NOT MET 10/1/2019    Reasons for Recertification of Therapy:   Updated plan of care    Plan     Updated Certification Period: 10/1/2019 to 11/29/2019  Recommended Treatment Plan: 2 times per week for 8 weeks: Gait Training, Manual Therapy, Moist Heat/ Ice, Neuromuscular Re-ed, Patient Education and Therapeutic Exercise  Other Recommendations: modalities prn, ASTYM prn, kinesiotape prn, Functional Dry Needling prn     Karime Snell, PT  10/1/2019      I CERTIFY THE NEED FOR THESE SERVICES FURNISHED UNDER THIS PLAN OF TREATMENT AND WHILE UNDER MY CARE    Physician's comments:        Physician's Signature: ___________________________________________________

## 2019-10-02 ENCOUNTER — TELEPHONE (OUTPATIENT)
Dept: INTERNAL MEDICINE | Facility: CLINIC | Age: 34
End: 2019-10-02

## 2019-10-02 DIAGNOSIS — M25.562 LEFT KNEE PAIN, UNSPECIFIED CHRONICITY: Primary | ICD-10-CM

## 2019-10-08 ENCOUNTER — CLINICAL SUPPORT (OUTPATIENT)
Dept: REHABILITATION | Facility: HOSPITAL | Age: 34
End: 2019-10-08
Payer: COMMERCIAL

## 2019-10-08 DIAGNOSIS — G89.29 CHRONIC RIGHT-SIDED LOW BACK PAIN WITHOUT SCIATICA: ICD-10-CM

## 2019-10-08 DIAGNOSIS — M54.50 CHRONIC RIGHT-SIDED LOW BACK PAIN WITHOUT SCIATICA: ICD-10-CM

## 2019-10-08 PROCEDURE — 97110 THERAPEUTIC EXERCISES: CPT | Mod: PN

## 2019-10-08 NOTE — PROGRESS NOTES
"                            Physical Therapy Daily Treatment Note     [id number]    Therapy Diagnosis:   Encounter Diagnosis   Name Primary?    Chronic right-sided low back pain without sciatica      Physician: April Chacon DO    Visit Date: 10/8/2019    Physician Orders: PT Eval and Treat   Medical Diagnosis from Referral: Back pain, unspecified back location, unspecified back pain laterality, unspecified chronicity  Evaluation Date: 9/10/2019  Authorization Period Expiration: 12/31/2019  Plan of Care Expiration: 9/10/2019 to 11/8/2019  Visit # / Visits authorized: 4/ 60  FOTO: 4/10 (give at visit 8)     Time In: 0700  Time Out: 0745  Total Billable Time: 45 minutes (TE-3)     Precautions: Standard    Subjective     Pt reports: [place] the beach she has had an increase in back pain in her mid back near her bra line. States she has pain when taking a deep breath and is having difficulty sleeping due to pain. Knee is improving and she is   She was compliant with home exercise program.  Response to previous treatment: improved pain  Functional change: standing and ambulating with less pain     Pain: 5/10  Location: left knee    Objective     Corrine received therapeutic exercise to improve ROM, strength, endurance, and posture for 45 minutes including:    -quadriceps set 20x5"  -short arc quadriceps 2x10  -hip ADduction isometric 20x5"  -clams 2x10 B  -supine over towel roll 5'  -bike 5'            Home Exercises Provided and Patient Education Provided     Education provided:   - kinesiotape wear  - PT will contact MD regarding order for therapy for her knee      Assessment     Patient tolerated treatment fairly well with pain with attempted straight leg raise, therefore straight leg raise was held. She performed all other exercises with minimal to no pain.      Corrine is progressing well towards her goals.   Pt prognosis is Good.     Pt will continue to benefit " from skilled outpatient physical therapy to address the deficits listed in the problem list box on initial evaluation, provide pt/family education and to maximize pt's level of independence in the home and community environment.     Pt's spiritual, cultural and educational needs considered and pt agreeable to plan of care and goals.    Anticipated barriers to physical therapy: none    Goals:    Short Term Goals (4 Weeks):  1. Pt will be compliant with HEP to supplement PT in decreasing pain with functional mobility. MET 10/1/2019  2. Pt will perform pallof press with good control to demonstrate improved core strength. PROGRESSING, NOT MET 10/8/2019  3. Pt will improve lumbar ROM to </=minimal loss in all planes to improve functional mobility. MET 10/1/2019  4. Pt will improve impaired LE MMTs to >/=4/5 to improve strength for functional tasks. PROGRESSING, NOT MET 10/8/2019     Long Term Goals (8 Weeks):   1. Pt will improve FOTO score to </= 30% limited to decrease perceived limitation with maintaining/changing body position. MET 10/1/2019  2. Pt will perform 20# crate carry x 50' with good control to demonstrate improved core strength. PROGRESSING, NOT MET 10/8/2019  3. Pt will improve impaired LE MMTs to >/=4+/5 to improve strength for functional tasks. PROGRESSING, NOT MET 10/8/2019  4. Pt will report no pain during lumbar ROM to promote functional mobility. PROGRESSING, NOT MET 10/8/2019  5. Patient will improve L knee AROM to 0-115 degrees to improve gait. PROGRESSING, NOT MET 10/8/2019    Plan     Focus on restoring pain-free knee ROM    Karime Snell, PT, DPT

## 2019-10-17 ENCOUNTER — CLINICAL SUPPORT (OUTPATIENT)
Dept: REHABILITATION | Facility: HOSPITAL | Age: 34
End: 2019-10-17
Payer: COMMERCIAL

## 2019-10-17 DIAGNOSIS — M54.50 CHRONIC RIGHT-SIDED LOW BACK PAIN WITHOUT SCIATICA: ICD-10-CM

## 2019-10-17 DIAGNOSIS — G89.29 CHRONIC RIGHT-SIDED LOW BACK PAIN WITHOUT SCIATICA: ICD-10-CM

## 2019-10-17 PROCEDURE — 97110 THERAPEUTIC EXERCISES: CPT | Mod: PN

## 2019-10-17 NOTE — PROGRESS NOTES
"                            Physical Therapy Daily Treatment Note     [id number]    Therapy Diagnosis:   Encounter Diagnosis   Name Primary?    Chronic right-sided low back pain without sciatica      Physician: April Chacon DO    Visit Date: 10/17/2019    Physician Orders: PT Eval and Treat   Medical Diagnosis from Referral: Back pain, unspecified back location, unspecified back pain laterality, unspecified chronicity  Evaluation Date: 9/10/2019  Authorization Period Expiration: 12/31/2019  Plan of Care Expiration: 9/10/2019 to 11/8/2019  Visit # / Visits authorized: 5/ 60  FOTO: 5/10 (give at visit 8)     Time In: 0701  Time Out: 0750  Total Billable Time: 49 minutes (TE-3)     Precautions: Standard    Subjective     Pt reports: pain is improving. She is able to kneel for short periods and weight bearing. Occasionally has a feeling of instability where her knee hyperextends. 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  She was compliant with home exercise program.  Response to previous treatment: improved pain  Functional change: standing and ambulating with less pain     Pain: 5/10  Location: left knee    Objective     Corrine received therapeutic exercise to improve ROM, strength, endurance, and posture for 49 minutes including:    -push/pull 3x7"  -quadriceps set 20x5"  -short arc quadriceps 3x10  -hip ADduction isometric 20x5"  -bridges 3x10  -clams 2x10 B 3" hold  -supine over towel roll 5'  -bike 5'  -kinesiotaping for mechanical correction of patella         Home Exercises Provided and Patient Education Provided     Education provided:   - prop L LE when sitting to off-load knee and aid in reducing edema      Assessment     Patient tolerated progression of exercises well with complaints of slight "pulling" with bridges on rep 4 of the 3rd set, however no increased pain. She presented with L anterior/R posterior pelvic obliquity that reversed with pelvic muscle-energy " technique.    L knee AROM: 0-120    Corrine is progressing well towards her goals.   Pt prognosis is Good.     Pt will continue to benefit from skilled outpatient physical therapy to address the deficits listed in the problem list box on initial evaluation, provide pt/family education and to maximize pt's level of independence in the home and community environment.     Pt's spiritual, cultural and educational needs considered and pt agreeable to plan of care and goals.    Anticipated barriers to physical therapy: none    Goals:    Short Term Goals (4 Weeks):  1. Pt will be compliant with HEP to supplement PT in decreasing pain with functional mobility. MET 10/1/2019  2. Pt will perform pallof press with good control to demonstrate improved core strength. PROGRESSING, NOT MET 10/17/2019  3. Pt will improve lumbar ROM to </=minimal loss in all planes to improve functional mobility. MET 10/1/2019  4. Pt will improve impaired LE MMTs to >/=4/5 to improve strength for functional tasks. PROGRESSING, NOT MET 10/17/2019     Long Term Goals (8 Weeks):   1. Pt will improve FOTO score to </= 30% limited to decrease perceived limitation with maintaining/changing body position. MET 10/1/2019  2. Pt will perform 20# crate carry x 50' with good control to demonstrate improved core strength. PROGRESSING, NOT MET 10/17/2019  3. Pt will improve impaired LE MMTs to >/=4+/5 to improve strength for functional tasks. PROGRESSING, NOT MET 10/17/2019  4. Pt will report no pain during lumbar ROM to promote functional mobility. PROGRESSING, NOT MET 10/17/2019  5. Patient will improve L knee AROM to 0-115 degrees to improve gait. MET 10/17/2019    Plan     Focus on restoring pain-free function    Karime Snell, PT, DPT

## 2019-10-23 NOTE — PROGRESS NOTES
"                            Physical Therapy Daily Treatment Note     Name: Corrine Hicks  [facility] Number: [id number]    Therapy Diagnosis:   Encounter Diagnosis   Name Primary?    Chronic right-sided low back pain without sciatica      Physician: April Chacon DO    Visit Date: 10/24/2019    Physician Orders: PT Eval and Treat   Medical Diagnosis from Referral: Back pain, unspecified back location, unspecified back pain laterality, unspecified chronicity  Evaluation Date: 9/10/2019  Authorization Period Expiration: 12/31/2019  Plan of Care Expiration: 9/10/2019 to 11/8/2019  Visit # / Visits authorized: 6/ 60  FOTO: 6/10 (give at visit 8)     Time In: 0705  Time Out: 0740  Total Billable Time: 35 minutes (TE-2)     Precautions: Standard    Subjective     Pt reports: pain is continually improving. Difficulty ascending/descending stairs at home, especially while carrying her toddler or laundry basket  She was compliant with home exercise program.  Response to previous treatment: improved pain  Functional change: standing and ambulating with less pain     Pain: 5/10  Location: left knee    Objective     Corrine received therapeutic exercise to improve ROM, strength, endurance, and posture for 35 minutes including:    -push/pull 3x7" NOT PERFORMED THIS TREATMENT  -quadriceps set 20x5"  -short arc quadriceps 3x10  -hip ADduction isometric 20x5"  -bridges 3x10  -clams 2x10 B 3" hold NOT PERFORMED THIS TREATMENT  -supine over towel roll 5' NOT PERFORMED THIS TREATMENT  -bike 5' for ROM  -kinesiotaping for mechanical correction of patella  -leg press 4.0 3x10  -Standing hip ABduction 2x10 red theraband         Home Exercises Provided and Patient Education Provided     Education provided:   - continue to prop L LE when sitting to off-load knee and aid in reducing edema      Assessment     Patient tolerated progression of exercises well with complaints of slight "pulling" with standing hip ABduction when standing on " JAMIE Hall is progressing well towards her goals.   Pt prognosis is Good.     Pt will continue to benefit from skilled outpatient physical therapy to address the deficits listed in the problem list box on initial evaluation, provide pt/family education and to maximize pt's level of independence in the home and community environment.     Pt's spiritual, cultural and educational needs considered and pt agreeable to plan of care and goals.    Anticipated barriers to physical therapy: none    Goals:    Short Term Goals (4 Weeks):  1. Pt will be compliant with HEP to supplement PT in decreasing pain with functional mobility. MET 10/1/2019  2. Pt will perform pallof press with good control to demonstrate improved core strength. PROGRESSING, NOT MET 10/24/2019  3. Pt will improve lumbar ROM to </=minimal loss in all planes to improve functional mobility. MET 10/1/2019  4. Pt will improve impaired LE MMTs to >/=4/5 to improve strength for functional tasks. PROGRESSING, NOT MET 10/24/2019     Long Term Goals (8 Weeks):   1. Pt will improve FOTO score to </= 30% limited to decrease perceived limitation with maintaining/changing body position. MET 10/1/2019  2. Pt will perform 20# crate carry x 50' with good control to demonstrate improved core strength. PROGRESSING, NOT MET 10/24/2019  3. Pt will improve impaired LE MMTs to >/=4+/5 to improve strength for functional tasks. PROGRESSING, NOT MET 10/24/2019  4. Pt will report no pain during lumbar ROM to promote functional mobility. PROGRESSING, NOT MET 10/24/2019  5. Patient will improve L knee AROM to 0-115 degrees to improve gait. MET 10/17/2019    Plan     Focus on restoring pain-free function    Karime Snell, PT, DPT

## 2019-10-24 ENCOUNTER — CLINICAL SUPPORT (OUTPATIENT)
Dept: REHABILITATION | Facility: HOSPITAL | Age: 34
End: 2019-10-24
Payer: COMMERCIAL

## 2019-10-24 DIAGNOSIS — G89.29 CHRONIC RIGHT-SIDED LOW BACK PAIN WITHOUT SCIATICA: ICD-10-CM

## 2019-10-24 DIAGNOSIS — M54.50 CHRONIC RIGHT-SIDED LOW BACK PAIN WITHOUT SCIATICA: ICD-10-CM

## 2019-10-24 PROCEDURE — 97110 THERAPEUTIC EXERCISES: CPT | Mod: PN

## 2020-03-25 ENCOUNTER — DOCUMENTATION ONLY (OUTPATIENT)
Dept: REHABILITATION | Facility: HOSPITAL | Age: 35
End: 2020-03-25

## 2020-03-25 DIAGNOSIS — M54.50 CHRONIC RIGHT-SIDED LOW BACK PAIN WITHOUT SCIATICA: ICD-10-CM

## 2020-03-25 DIAGNOSIS — G89.29 CHRONIC RIGHT-SIDED LOW BACK PAIN WITHOUT SCIATICA: ICD-10-CM

## 2020-03-25 NOTE — PROGRESS NOTES
Pt was evaluated on 9/10/2019 and was seen 6 times for PT. Pt has not attended PT since 10/24/2019. Pt was given HEP. Current status is unknown. Pt to be d/c'd at this time.

## 2020-04-20 ENCOUNTER — PATIENT MESSAGE (OUTPATIENT)
Dept: INTERNAL MEDICINE | Facility: CLINIC | Age: 35
End: 2020-04-20

## 2020-04-21 DIAGNOSIS — Z01.84 ANTIBODY RESPONSE EXAMINATION: ICD-10-CM

## 2020-04-21 NOTE — PROGRESS NOTES
Subjective:       Patient ID: Corrine Hicks is a 35 y.o. female.    Chief Complaint: No chief complaint on file.    Patient is a 35 y.o.female who presents today for back pain  The patient location is: home  The chief complaint leading to consultation is: back pain  Visit type: audiovisual  Total time spent with patient: 15 min  Each patient to whom he or she provides medical services by telemedicine is:  (1) informed of the relationship between the physician and patient and the respective role of any other health care provider with respect to management of the patient; and (2) notified that he or she may decline to receive medical services by telemedicine and may withdraw from such care at any time.    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   - she had hurt her knee , went to PT and it got better  - when she hurt her knee it irritated her back  - the back pain is intermittent  - she had a massage in March, it didn't do anything; it caused her pain to get worse  - it is a deep ache or a burning sensation; at its worse, it is 7/10.   - she is taking gabapentin at work  - she is taking tylenol 2-3 times per day  Review of Systems   Constitutional: Negative for appetite change, chills, diaphoresis and fever.   HENT: Negative for congestion, ear discharge, ear pain, postnasal drip, tinnitus, trouble swallowing and voice change.    Eyes: Negative for discharge, redness and itching.   Respiratory: Negative for cough, chest tightness, shortness of breath and wheezing.    Cardiovascular: Negative for chest pain, palpitations and leg swelling.   Gastrointestinal: Negative for  abdominal pain, constipation, diarrhea, nausea and vomiting.   Endocrine: Negative for cold intolerance and heat intolerance.   Genitourinary: Negative for difficulty urinating, flank pain, hematuria and urgency.   Musculoskeletal: Positive for back pain. Negative for arthralgias, gait problem, myalgias and neck stiffness.   Skin: Negative for color change and rash.   Neurological: Negative for dizziness, seizures, syncope and headaches.   Hematological: Negative for adenopathy.   Psychiatric/Behavioral: Negative for agitation, behavioral problems, confusion and sleep disturbance.       Objective:      Physical Exam   Constitutional: She is oriented to person, place, and time. She appears well-developed and well-nourished. No distress.   HENT:   Head: Normocephalic and atraumatic.   Right Ear: External ear normal.   Left Ear: External ear normal.   Eyes: Pupils are equal, round, and reactive to light. Conjunctivae are normal. Right eye exhibits no discharge. Left eye exhibits no discharge. No scleral icterus.   Neck: Normal range of motion. Neck supple.   Pulmonary/Chest: Effort normal. No stridor.   Neurological: She is alert and oriented to person, place, and time.   Skin: She is not diaphoretic.   Psychiatric: She has a normal mood and affect. Her behavior is normal. Judgment and thought content normal.       Assessment and Plan:       1. Back pain, unspecified back location, unspecified back pain laterality, unspecified chronicity  - lidocaine (LIDODERM) 5 %; Place 1 patch onto the skin once daily. Remove & Discard patch within 12 hours or as directed by MD  Dispense: 30 patch; Refill: 11  - methocarbamoL (ROBAXIN) 750 MG Tab; Take 1 tablet (750 mg total) by mouth 3 (three) times daily.  Dispense: 90 tablet; Refill: 1 ( for daytime)  - tiZANidine (ZANAFLEX) 2 MG tablet; Take 1 tablet (2 mg total) by mouth nightly as needed (back pain).  Dispense: 30 tablet; Refill: 11 ( for evening)  - gabapentin 300 mg at  night  - pt seeing a naturopath  - may need pain management referral  Notify clinic if symptoms change, worsen or do not improve          No follow-ups on file.

## 2020-04-24 ENCOUNTER — OFFICE VISIT (OUTPATIENT)
Dept: INTERNAL MEDICINE | Facility: CLINIC | Age: 35
End: 2020-04-24
Payer: COMMERCIAL

## 2020-04-24 DIAGNOSIS — M54.9 BACK PAIN, UNSPECIFIED BACK LOCATION, UNSPECIFIED BACK PAIN LATERALITY, UNSPECIFIED CHRONICITY: Primary | ICD-10-CM

## 2020-04-24 PROCEDURE — 99214 OFFICE O/P EST MOD 30 MIN: CPT | Mod: 95,,, | Performed by: INTERNAL MEDICINE

## 2020-04-24 PROCEDURE — 99214 PR OFFICE/OUTPT VISIT, EST, LEVL IV, 30-39 MIN: ICD-10-PCS | Mod: 95,,, | Performed by: INTERNAL MEDICINE

## 2020-04-24 RX ORDER — LIDOCAINE 50 MG/G
1 PATCH TOPICAL DAILY
Qty: 30 PATCH | Refills: 11 | Status: SHIPPED | OUTPATIENT
Start: 2020-04-24 | End: 2021-07-27

## 2020-04-24 RX ORDER — METHOCARBAMOL 750 MG/1
750 TABLET, FILM COATED ORAL 3 TIMES DAILY
Qty: 90 TABLET | Refills: 1 | Status: SHIPPED | OUTPATIENT
Start: 2020-04-24 | End: 2020-07-13 | Stop reason: SDUPTHER

## 2020-04-24 RX ORDER — TIZANIDINE 2 MG/1
2 TABLET ORAL NIGHTLY PRN
Qty: 30 TABLET | Refills: 11 | Status: SHIPPED | OUTPATIENT
Start: 2020-04-24 | End: 2020-05-04

## 2020-04-27 ENCOUNTER — TELEPHONE (OUTPATIENT)
Dept: INTERNAL MEDICINE | Facility: CLINIC | Age: 35
End: 2020-04-27

## 2020-05-21 DIAGNOSIS — Z01.84 ANTIBODY RESPONSE EXAMINATION: ICD-10-CM

## 2020-06-20 ENCOUNTER — CLINICAL SUPPORT (OUTPATIENT)
Dept: URGENT CARE | Facility: CLINIC | Age: 35
End: 2020-06-20
Payer: COMMERCIAL

## 2020-06-20 VITALS — TEMPERATURE: 98 F | HEART RATE: 84 BPM | OXYGEN SATURATION: 97 %

## 2020-06-20 DIAGNOSIS — R50.9 FEVER, UNSPECIFIED FEVER CAUSE: Primary | ICD-10-CM

## 2020-06-20 DIAGNOSIS — R05.9 COUGH: ICD-10-CM

## 2020-06-20 DIAGNOSIS — R50.9 FEVER, UNSPECIFIED FEVER CAUSE: ICD-10-CM

## 2020-06-20 DIAGNOSIS — Z01.84 ANTIBODY RESPONSE EXAMINATION: ICD-10-CM

## 2020-06-20 PROCEDURE — U0003 INFECTIOUS AGENT DETECTION BY NUCLEIC ACID (DNA OR RNA); SEVERE ACUTE RESPIRATORY SYNDROME CORONAVIRUS 2 (SARS-COV-2) (CORONAVIRUS DISEASE [COVID-19]), AMPLIFIED PROBE TECHNIQUE, MAKING USE OF HIGH THROUGHPUT TECHNOLOGIES AS DESCRIBED BY CMS-2020-01-R: HCPCS

## 2020-06-20 NOTE — LETTER
[address] [facility] ? [place], [address] ? Phone [phone] ? Fax [phone]           Return to Work/School    Patient: Corrine Gan  YOB: 1985   Date: 06/23/2020      To Whom It May Concern:     Corrine Gan was in contact with/seen in my office on 06/23/2020. COVID-19 is present in our communities across the state. Not all patients are eligible or appropriate to be tested. In this situation, your employee meets the following criteria:     Corrine Gan has met the criteria for COVID-19 testing and has a NEGATIVE result. The employee can return to work once they are asymptomatic for 72 hours without the use of fever reducing medications (Tylenol, Motrin, etc).Return to work 6/24/2020.     If you have any questions or concerns, or if I can be of further assistance, please do not hesitate to contact me.     Sincerely,    NURSE URGENT CARE, [facility]

## 2020-06-20 NOTE — LETTER
[address] [facility] ? [place], [address] ? Phone [phone] ? Fax [phone]           Return to Work/School    Patient: Corrine Gan  YOB: 1985   Date: 06/23/2020      To Whom It May Concern:     Corrine Gan was in contact with/seen in my office on 06/23/2020. COVID-19 is present in our communities across the state. Not all patients are eligible or appropriate to be tested. In this situation, your employee meets the following criteria:     Corrine Gan has met the criteria for COVID-19 testing and has a NEGATIVE result. The employee can return to work once they are asymptomatic for 72 hours without the use of fever reducing medications (Tylenol, Motrin, etc).Return to work  6/24/2020.     If you have any questions or concerns, or if I can be of further assistance, please do not hesitate to contact me.     Sincerely,    NURSE URGENT CARE, [facility]

## 2020-06-23 ENCOUNTER — TELEPHONE (OUTPATIENT)
Dept: URGENT CARE | Facility: CLINIC | Age: 35
End: 2020-06-23

## 2020-06-23 LAB — SARS-COV-2 RNA RESP QL NAA+PROBE: NOT DETECTED

## 2020-06-23 NOTE — TELEPHONE ENCOUNTER
Called pt on behalf of Employee Health to discuss COVID-19 result. All questions were answered and return to work policies were reviewed.

## 2020-07-11 ENCOUNTER — PATIENT MESSAGE (OUTPATIENT)
Dept: INTERNAL MEDICINE | Facility: CLINIC | Age: 35
End: 2020-07-11

## 2020-07-11 DIAGNOSIS — M54.9 BACK PAIN, UNSPECIFIED BACK LOCATION, UNSPECIFIED BACK PAIN LATERALITY, UNSPECIFIED CHRONICITY: ICD-10-CM

## 2020-07-13 RX ORDER — METHOCARBAMOL 750 MG/1
750 TABLET, FILM COATED ORAL 3 TIMES DAILY
Qty: 90 TABLET | Refills: 0 | Status: SHIPPED | OUTPATIENT
Start: 2020-07-13 | End: 2021-07-27

## 2020-07-13 RX ORDER — SCOLOPAMINE TRANSDERMAL SYSTEM 1 MG/1
1 PATCH, EXTENDED RELEASE TRANSDERMAL
Qty: 4 PATCH | Refills: 0 | Status: SHIPPED | OUTPATIENT
Start: 2020-07-13 | End: 2020-09-24

## 2020-07-13 NOTE — TELEPHONE ENCOUNTER
Who originally Rx the gabapentin  And how often did she take it?    dont see order for omeprazole  She can purchase OTC    Escripted the methocarbamol  And Transderm Scop for travel

## 2020-07-15 NOTE — TELEPHONE ENCOUNTER
rec appt , to review her c/o as they sound to have progressed w/ her taking more medication     Dr. Hankins noted she was taking gabapentin hs only

## 2020-07-20 DIAGNOSIS — Z01.84 ANTIBODY RESPONSE EXAMINATION: ICD-10-CM

## 2020-08-04 ENCOUNTER — TELEPHONE (OUTPATIENT)
Dept: INTERNAL MEDICINE | Facility: CLINIC | Age: 35
End: 2020-08-04

## 2020-08-04 NOTE — TELEPHONE ENCOUNTER
----- Message from Armando Quarles sent at 8/4/2020  3:58 PM CDT -----  Contact: Self- [phone]  Would like to receive medical advice.    Would they like a call back or a response via [facility]:  Call back     Additional information:  Pt needs to get an MRI done for her back pain. She states none of the medication is working and constant pain at level 6. Please call to schedule an MRI. She will f/u with doctor once MRI results come back. Pt reports currently living ever day at pain level 6 or 7. 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 Call back is preferred in afternoon or [facility].

## 2020-08-04 NOTE — TELEPHONE ENCOUNTER
"Pt requesting MRI of back, states shes seen  multiple times for this shes lost 90lbs and did PT as requested. Has never had any prior imaging of her back. Requesting MRI, advised pt should schedule to be evaluated she got upset stating "this is ridiculous" asking for whatever test ness. Just be ordered.    Please advise  "

## 2020-08-05 ENCOUNTER — OFFICE VISIT (OUTPATIENT)
Dept: INTERNAL MEDICINE | Facility: CLINIC | Age: 35
End: 2020-08-05
Payer: COMMERCIAL

## 2020-08-05 DIAGNOSIS — G89.29 CHRONIC THORACIC BACK PAIN, UNSPECIFIED BACK PAIN LATERALITY: Primary | ICD-10-CM

## 2020-08-05 DIAGNOSIS — M54.6 CHRONIC THORACIC BACK PAIN, UNSPECIFIED BACK PAIN LATERALITY: Primary | ICD-10-CM

## 2020-08-05 PROCEDURE — 99213 OFFICE O/P EST LOW 20 MIN: CPT | Mod: 95,,, | Performed by: HOSPITALIST

## 2020-08-05 PROCEDURE — 99213 PR OFFICE/OUTPT VISIT, EST, LEVL III, 20-29 MIN: ICD-10-PCS | Mod: 95,,, | Performed by: HOSPITALIST

## 2020-08-05 NOTE — TELEPHONE ENCOUNTER
Spoke to pt and informed of apt needed. Booked virtual for today. Pt works nights and requesting late afternoon apt. Apt booked for today and advised that another x-ray may be needed prior to MRI. She verbalized understanding.

## 2020-08-05 NOTE — PROGRESS NOTES
Virtual Visit  The patient location is: [place]  The chief complaint leading to consultation is: back pain  Visit type: Virtual visit with synchronous audio and video  Total time spent with patient: 10 min  Each patient to whom he or she provides medical services by telemedicine is:  (1) informed of the relationship between the physician and patient and the respective role of any other health care provider with respect to management of the patient; and (2) notified that he or she may decline to receive medical services by telemedicine and may withdraw from such care at any time.    Subjective:       @Patient ID: Corrine Hicks is a 35 y.o. female.    Chief Complaint: Back Pain    HPI  34 yo  F with chronic back pain for few years but lately worsened   Reports 2/10 in pain   Works nightshift as ER as nurse  Reports recently pain has worsened, waking her up. Reports pain is crippiling and   Reports located R shoulder pain/thoracic pain.  Feels like a tingling sensation that go accorss her back.   Has tried PT, steroids, tizanidine (makes her drowsy), flexeril (drowsy),   Only on tizanidine rarely, is taking the gabapentin. Lidocaine patch, trying essential patch.   Deferred MRI last year.   Taking tylenol 500 mg bid. NSAIDs unable due to sleeve.   Has had xray of thoracic spine.      Review of Systems   Constitutional: Negative for chills and fever.   HENT: Negative for congestion and sore throat.    Eyes: Negative for pain and visual disturbance.   Respiratory: Negative for cough and shortness of breath.    Cardiovascular: Negative for chest pain and leg swelling.   Gastrointestinal: Negative for abdominal pain, nausea and vomiting.   Genitourinary: Negative for difficulty urinating and dysuria.   Musculoskeletal: Positive for back pain. Negative for arthralgias.   Neurological: Negative for dizziness, weakness and headaches.   Psychiatric/Behavioral: Negative for agitation and confusion.     Past medical  history, surgical history, and family medical history reviewed and updated as appropriate.    Medications and allergies reviewed.     Objective:     There were no vitals filed for this visit.  There is no height or weight on file to calculate BMI.  Physical Exam  Constitutional:       Appearance: Normal appearance.   HENT:      Head: Normocephalic and atraumatic.   Pulmonary:      Effort: Pulmonary effort is normal.   Neurological:      Mental Status: She is alert and oriented to person, place, and time.   Psychiatric:         Mood and Affect: Mood normal.         Behavior: Behavior normal.         Lab Results   Component Value Date    WBC 8.86 11/08/2018    HGB 12.4 11/08/2018    HCT 37.8 11/08/2018     (H) 11/08/2018    CHOL 167 11/08/2018    TRIG 124 11/08/2018    HDL 40 11/08/2018    ALT 32 11/08/2018    AST 20 11/08/2018     11/08/2018    K 4.0 11/08/2018     11/08/2018    CREATININE 0.7 11/08/2018    BUN 15 11/08/2018    CO2 19 (L) 11/08/2018    TSH 1.912 11/08/2018    INR 0.9 07/31/2017    HGBA1C 5.6 11/08/2018       Assessment:     1. Chronic thoracic back pain, unspecified back pain laterality      Plan:   Corrine was seen today for back pain.    Diagnoses and all orders for this visit:    Chronic thoracic back pain, unspecified back pain laterality  -     X-Ray Thoracic Spine AP Lateral; Future  -     MRI Thoracic Spine Without Contrast; Future          No follow-ups on file.    Ronda Torrez MD  Internal Medicine    8/5/2020

## 2020-08-06 ENCOUNTER — HOSPITAL ENCOUNTER (OUTPATIENT)
Dept: RADIOLOGY | Facility: HOSPITAL | Age: 35
Discharge: HOME | End: 2020-08-06
Attending: HOSPITALIST
Payer: COMMERCIAL

## 2020-08-06 DIAGNOSIS — G89.29 CHRONIC THORACIC BACK PAIN, UNSPECIFIED BACK PAIN LATERALITY: ICD-10-CM

## 2020-08-06 DIAGNOSIS — M54.6 CHRONIC THORACIC BACK PAIN, UNSPECIFIED BACK PAIN LATERALITY: ICD-10-CM

## 2020-08-06 PROCEDURE — 72070 XR THORACIC SPINE AP LATERAL: ICD-10-PCS | Mod: 26,,, | Performed by: RADIOLOGY

## 2020-08-06 PROCEDURE — 72070 X-RAY EXAM THORAC SPINE 2VWS: CPT | Mod: TC,FY

## 2020-08-06 PROCEDURE — 72070 X-RAY EXAM THORAC SPINE 2VWS: CPT | Mod: 26,,, | Performed by: RADIOLOGY

## 2020-08-08 ENCOUNTER — HOSPITAL ENCOUNTER (OUTPATIENT)
Dept: RADIOLOGY | Facility: HOSPITAL | Age: 35
Discharge: HOME | End: 2020-08-08
Attending: HOSPITALIST
Payer: COMMERCIAL

## 2020-08-08 DIAGNOSIS — G89.29 CHRONIC THORACIC BACK PAIN, UNSPECIFIED BACK PAIN LATERALITY: ICD-10-CM

## 2020-08-08 DIAGNOSIS — M54.6 CHRONIC THORACIC BACK PAIN, UNSPECIFIED BACK PAIN LATERALITY: ICD-10-CM

## 2020-08-08 PROCEDURE — 72146 MRI THORACIC SPINE WITHOUT CONTRAST: ICD-10-PCS | Mod: 26,,, | Performed by: RADIOLOGY

## 2020-08-08 PROCEDURE — 72146 MRI CHEST SPINE W/O DYE: CPT | Mod: TC

## 2020-08-08 PROCEDURE — 72146 MRI CHEST SPINE W/O DYE: CPT | Mod: 26,,, | Performed by: RADIOLOGY

## 2020-08-09 ENCOUNTER — PATIENT MESSAGE (OUTPATIENT)
Dept: INTERNAL MEDICINE | Facility: CLINIC | Age: 35
End: 2020-08-09

## 2020-08-09 DIAGNOSIS — M54.6 CHRONIC THORACIC BACK PAIN, UNSPECIFIED BACK PAIN LATERALITY: Primary | ICD-10-CM

## 2020-08-09 DIAGNOSIS — G89.29 CHRONIC THORACIC BACK PAIN, UNSPECIFIED BACK PAIN LATERALITY: Primary | ICD-10-CM

## 2020-08-19 DIAGNOSIS — Z01.84 ANTIBODY RESPONSE EXAMINATION: ICD-10-CM

## 2020-09-14 ENCOUNTER — PATIENT OUTREACH (OUTPATIENT)
Dept: FAMILY MEDICINE | Facility: CLINIC | Age: 35
End: 2020-09-14

## 2020-09-18 DIAGNOSIS — Z01.84 ANTIBODY RESPONSE EXAMINATION: ICD-10-CM

## 2020-09-23 NOTE — PROGRESS NOTES
Subjective:      Patient ID: Corrine Gan is a 35 y.o. female.    Chief Complaint: Low-back Pain    Ms Hicks is a 34 yo female sent in consultation by Dr. Paz for evaluation of upper back pain.  The upper back pain is a constant bother.  The pain has been present for 2 years.  The pain is worse around the right shoulder blade and travels across to the left.  It is a burning and spasming.  There are pins and needles as going across. The pain worsens throughout her workday.  It makes it hard to sleep.  She feels like she is conscious of ways she moves.  She feels like she moves slow.  She has a hard time walking dog.  She has a hard time picking up 3 year hold.  She does feel like it is the repeat arm motion.  Sometimes just walking.  She has to bed and lift at work.  The worst is at the end of a work day.  The pain is 3/10 now, worst 7/10 after helping a patient and charting, best 3/10 in the morning.  She has been to PT, she has lost weight, she has used lidocaine, robaxin, tizanidine, ibuprofen, tylenol, gabapentin.  She does not like meds, so did not use meds often. She has stopped meds.  She has not done PT in the past year.  She feels like weight loss helped but still hurts.  She does have some pain looking down.  She feels better bending forward.      MRI thoracic 8/8/2020  Vertebral body heights and alignment are within normal limits.  The intervertebral disc spaces are preserved.  Incidental note made of scattered hemangiomas throughout the thoracic and upper lumbar vertebral bodies, most notably at the level of T8.  No evidence of fracture or marrow replacement process.     The thoracic cord is normal and signal and caliber.     Paraspinous soft tissues are within normal limits.     Limited evaluation of the intrathoracic and upper abdominal structures demonstrates no gross abnormality.     C7-T1 through T5-6:   No spinal canal or neuroforaminal stenosis.     T6-7: Suspect small posterior central  disc protrusion.  No spinal canal or neuroforaminal stenosis.     T7-8:  No spinal canal or neuroforaminal stenosis.     T8-9: Small posterior central disc protrusion.  No spinal canal or neuroforaminal stenosis.     T9-10: Small posterior central disc protrusion.  No spinal canal or neuroforaminal stenosis.     T10-11:  No spinal canal or neuroforaminal stenosis.     T11-12:  No spinal canal or neuroforaminal stenosis.     Impression:     Mild multilevel degenerative disc disease in the mid to lower thoracic spine as above.  No spinal canal stenosis or neural foraminal narrowing demonstrated.    X-ray thoracic 2020  Thoracic spine vertebral body heights and alignment are maintained with very modest discogenic endplate change.  Visualized lungs are clear.     Impression:     As above.    Past Medical History:  No date: Coronary artery disease      Comment:  large heart noted on CXR , No problems  No date: Diabetes mellitus      Comment:  previous and current pregnancy, diet controlled.                Gestational DM only  No date: Gestational diabetes  No date: Hypertension      Comment:  preEclampsia  No date: Pregnancy      Comment:  x 2; preecalmpsia for second one    Past Surgical History:  No date:  SECTION  No date: gastric sleeve  No date: TONSILLECTOMY    Review of patient's family history indicates:  Problem: Diabetes      Relation: Mother          Age of Onset: (Not Specified)  Problem: Hypertension      Relation: Father          Age of Onset: (Not Specified)  Problem: Diabetes      Relation: Maternal Grandmother          Age of Onset: (Not Specified)      Social History    Socioeconomic History      Marital status: Single      Spouse name: Not on file      Number of children: Not on file      Years of education: Not on file      Highest education level: Not on file    Occupational History      Not on file    Social Needs      Financial resource strain: Somewhat hard      Food insecurity         Worry: Never true        Inability: Never true      Transportation needs        Medical: No        Non-medical: No    Tobacco Use      Smoking status: Current Every Day Smoker        Packs/day: 0.50        Types: Cigarettes      Smokeless tobacco: Never Used    Substance and Sexual Activity      Alcohol use: No        Frequency: 2-4 times a month        Drinks per session: 1 or 2        Binge frequency: Less than monthly      Drug use: No      Sexual activity: Yes    Lifestyle      Physical activity        Days per week: 5 days        Minutes per session: 60 min      Stress: To some extent    Relationships      Social connections        Talks on phone: Not on file        Gets together: Once a week        Attends Oriental orthodox service: Not on file        Active member of club or organization: No        Attends meetings of clubs or organizations: Never        Relationship status:     Other Topics      Concerns:        Not on file    Social History Narrative      Not on file      Current Outpatient Medications:  desogestrel-ethinyl estradiol (ISIBLOOM) 0.15-0.03 mg per tablet, Take 1 tablet by mouth once daily., Disp: 84 tablet, Rfl: 3  gabapentin (NEURONTIN) 300 MG capsule, TK 1 C PO TID PRF NERVE PAIN, Disp: , Rfl: 3  lidocaine (LIDODERM) 5 %, Place 1 patch onto the skin once daily. Remove & Discard patch within 12 hours or as directed by MD, Disp: 30 patch, Rfl: 11  methocarbamoL (ROBAXIN) 750 MG Tab, Take 1 tablet (750 mg total) by mouth 3 (three) times daily., Disp: 90 tablet, Rfl: 0  mupirocin (BACTROBAN) 2 % ointment, Apply topically 3 (three) times daily., Disp: 30 g, Rfl: 1  mupirocin calcium 2% (BACTROBAN) 2 % cream, Apply topically 3 (three) times daily., Disp: 30 g, Rfl: 1  scopolamine (TRANSDERM-SCOP) 1.3-1.5 mg (1 mg over 3 days), Place 1 patch onto the skin every 72 hours. Place behind the ear after cleaning and drying the area, Disp: 4 patch, Rfl: 0    No current facility-administered medications  for this visit.       Review of patient's allergies indicates:  No Known Allergies        Review of Systems   Constitution: Negative for weight gain and weight loss.   Cardiovascular: Negative for chest pain.   Respiratory: Negative for shortness of breath.    Musculoskeletal: Positive for back pain (tbase of the right shoulder blade and left). Negative for joint pain and joint swelling.   Gastrointestinal: Positive for vomiting. Negative for abdominal pain, bowel incontinence and nausea.   Genitourinary: Negative for bladder incontinence.   Neurological: Positive for paresthesias (across back). Negative for numbness.         Objective:        General: Corrine is well-developed, well-nourished, appears stated age, in no acute distress, alert and oriented to time, place and person.     General    Vitals reviewed.  Constitutional: She is oriented to person, place, and time. She appears well-developed and well-nourished.   HENT:   Head: Normocephalic and atraumatic.   Pulmonary/Chest: Effort normal.   Neurological: She is alert and oriented to person, place, and time.   Psychiatric: She has a normal mood and affect. Her behavior is normal. Judgment and thought content normal.     General Musculoskeletal Exam   Gait: normal     Right Ankle/Foot Exam     Tests   Heel Walk: able to perform  Tiptoe Walk: able to perform    Left Ankle/Foot Exam     Tests   Heel Walk: able to perform  Tiptoe Walk: able to perform  Back (L-Spine & T-Spine) / Neck (C-Spine) Exam     Tenderness   The patient is tender to palpation of the right scapular. Right paramedian tenderness of the Lower T-Spine. Left paramedian tenderness of the Lower T-Spine.     Back (L-Spine & T-Spine) Range of Motion   Extension: 20   Flexion: 90   Lateral bend right: 20   Lateral bend left: 20   Rotation right: 40   Rotation left: 40     Neck (C-Spine) Range of Motion   Flexion:     40  Extension: 40    Spinal Sensation   Right Side Sensation  C-Spine Level: normal    L-Spine Level: normal  S-Spine Level: normal  Left Side Sensation  C-Spine Level: normal  L-Spine Level: normal  S-Spine Level: normal    Back (L-Spine & T-Spine) Tests   Right Side Tests  Straight leg raise:      Sitting SLR: > 70 degrees      Left Side Tests  Straight leg raise:     Sitting SLR: > 70 degrees          Other She has no scoliosis .  Spinal Kyphosis:  Absent      Muscle Strength   Right Upper Extremity   Biceps: 5/5   Deltoid:  5/5  Triceps:  5/5  Wrist extension: 5/5   Finger Flexors:  5/5  Left Upper Extremity  Biceps: 5/5   Deltoid:  5/5  Triceps:  5/5  Wrist extension: 5/5   Finger Flexors:  5/5  Right Lower Extremity   Hip Flexion: 5/5   Quadriceps:  5/5   Anterior tibial:  5/5   EHL:  5/5  Left Lower Extremity   Hip Flexion: 5/5   Quadriceps:  5/5   Anterior tibial:  5/5   EHL:  5/5    Reflexes     Left Side  Biceps:  2+  Triceps:  2+  Brachioradialis:  2+  Achilles:  2+  Left Gramajo's Sign:  Absent  Babinski Sign:  absent  Quadriceps:  2+    Right Side   Biceps:  2+  Triceps:  2+  Brachioradialis:  2+  Achilles:  2+  Right Gramajo's Sign:  absent  Babinski Sign:  absent  Quadriceps:  2+    Vascular Exam     Right Pulses        Carotid:                  2+    Left Pulses        Carotid:                  2+              Assessment:       1. Muscle spasm    2. Chronic thoracic back pain, unspecified back pain laterality           Plan:       Orders Placed This Encounter    Ambulatory referral/consult to Physical/Occupational Therapy      1. We discussed back pain and the nature of back pain.  We discussed that it is not one thing that causes the pain but an accumulation of multiple things that we do.  We discussed her MRI and she thought there would be something.  We discussed that it is not unusual for MRI to be ok.  We discussed all the things she does all day.    2. We discussed posture sitting and the importance of trying to sit better.  We discussed trying to sit with shoulder back and  not letting right shoulder roll forward.  Watching computer and mouse.   3. We discussed the benefits of therapy and exercise and continuing to move.  She did not continue HEP, then tried to do supermans every day and 4th day hurt too much.  We discussed gradual increase in exercise  4. PT for scapula stabilization, posture training and myofascial release, cervical retractions and HEP at [place]  5. We did discuss healthy back.  She cannot commit currently  6. discussed trying muscle relaxer more often and throughout the day.  7. RTC 3 months    More than 50% of the total time  of 45 minutes was spent face to face in counseling on diagnosis and treatment options. I also counseled patient  on common and most usual side effect of prescribed medications.  I reviewed Primary care , and other specialty's notes to better coordinate patient's care. All questions were answered, and patient voiced understanding.     A consultation note will be sent to Dr. Paz through epic.  Thanks for consult      Follow-up: No follow-ups on file. If there are any questions prior to this, the patient was instructed to contact the office.

## 2020-09-24 ENCOUNTER — OFFICE VISIT (OUTPATIENT)
Dept: SPINE | Facility: CLINIC | Age: 35
End: 2020-09-24
Attending: PHYSICAL MEDICINE & REHABILITATION
Payer: COMMERCIAL

## 2020-09-24 VITALS
BODY MASS INDEX: 28.65 KG/M2 | HEIGHT: 65 IN | DIASTOLIC BLOOD PRESSURE: 59 MMHG | HEART RATE: 78 BPM | WEIGHT: 171.94 LBS | SYSTOLIC BLOOD PRESSURE: 111 MMHG

## 2020-09-24 DIAGNOSIS — G89.29 CHRONIC THORACIC BACK PAIN, UNSPECIFIED BACK PAIN LATERALITY: ICD-10-CM

## 2020-09-24 DIAGNOSIS — M62.838 MUSCLE SPASM: Primary | ICD-10-CM

## 2020-09-24 DIAGNOSIS — M54.6 CHRONIC THORACIC BACK PAIN, UNSPECIFIED BACK PAIN LATERALITY: ICD-10-CM

## 2020-09-24 PROCEDURE — 99244 OFF/OP CNSLTJ NEW/EST MOD 40: CPT | Mod: S$GLB,,, | Performed by: PHYSICAL MEDICINE & REHABILITATION

## 2020-09-24 PROCEDURE — 99244 PR OFFICE CONSULTATION,LEVEL IV: ICD-10-PCS | Mod: S$GLB,,, | Performed by: PHYSICAL MEDICINE & REHABILITATION

## 2020-09-24 PROCEDURE — 99999 PR PBB SHADOW E&M-EST. PATIENT-LVL IV: CPT | Mod: PBBFAC,,, | Performed by: PHYSICAL MEDICINE & REHABILITATION

## 2020-09-24 PROCEDURE — 99999 PR PBB SHADOW E&M-EST. PATIENT-LVL IV: ICD-10-PCS | Mod: PBBFAC,,, | Performed by: PHYSICAL MEDICINE & REHABILITATION

## 2020-09-24 NOTE — LETTER
September 24, 2020      Ronda Torrez MD  [address]  [place] [address]           [facility]  [address], [address]  [facility] [address]  Phone: [phone]  Fax: [phone]          Patient: Corrine Gan   MR Number: [id number]   YOB: 1985   Date of Visit: 9/24/2020       Dear Dr. Ronda Torrez:    Thank you for referring Corrine Gan to me for evaluation. Attached you will find relevant portions of my assessment and plan of care.    If you have questions, please do not hesitate to call me. I look forward to following Corrine Gan along with you.    Sincerely,    Sravanthi Gaona MD    Enclosure  CC:  No Recipients    If you would like to receive this communication electronically, please contact [email or web address] or [phone] to request more information on [employer]. Link access.    For providers and/or their staff who would like to refer a patient to [facility], please contact us through our one-stop-shop provider referral line, [facility], at [phone].    If you feel you have received this communication in error or would no longer like to receive these types of communications, please e-mail [email or web address]

## 2020-10-18 DIAGNOSIS — Z01.84 ANTIBODY RESPONSE EXAMINATION: ICD-10-CM

## 2020-11-17 DIAGNOSIS — Z01.84 ANTIBODY RESPONSE EXAMINATION: ICD-10-CM

## 2020-12-15 ENCOUNTER — IMMUNIZATION (OUTPATIENT)
Dept: INTERNAL MEDICINE | Facility: CLINIC | Age: 35
End: 2020-12-15
Payer: COMMERCIAL

## 2020-12-15 DIAGNOSIS — Z23 NEED FOR VACCINATION: ICD-10-CM

## 2020-12-15 PROCEDURE — 91300 COVID-19, MRNA, LNP-S, PF, 30 MCG/0.3 ML DOSE VACCINE: ICD-10-PCS | Mod: ,,, | Performed by: FAMILY MEDICINE

## 2020-12-15 PROCEDURE — 0001A COVID-19, MRNA, LNP-S, PF, 30 MCG/0.3 ML DOSE VACCINE: ICD-10-PCS | Mod: CV19,,, | Performed by: FAMILY MEDICINE

## 2020-12-15 PROCEDURE — 91300 COVID-19, MRNA, LNP-S, PF, 30 MCG/0.3 ML DOSE VACCINE: CPT | Mod: ,,, | Performed by: FAMILY MEDICINE

## 2020-12-15 PROCEDURE — 0001A COVID-19, MRNA, LNP-S, PF, 30 MCG/0.3 ML DOSE VACCINE: CPT | Mod: CV19,,, | Performed by: FAMILY MEDICINE

## 2021-01-05 ENCOUNTER — IMMUNIZATION (OUTPATIENT)
Dept: INTERNAL MEDICINE | Facility: CLINIC | Age: 36
End: 2021-01-05
Payer: COMMERCIAL

## 2021-01-05 DIAGNOSIS — Z23 NEED FOR VACCINATION: ICD-10-CM

## 2021-01-05 PROCEDURE — 0002A COVID-19, MRNA, LNP-S, PF, 30 MCG/0.3 ML DOSE VACCINE: CPT | Mod: PBBFAC | Performed by: FAMILY MEDICINE

## 2021-01-05 PROCEDURE — 91300 COVID-19, MRNA, LNP-S, PF, 30 MCG/0.3 ML DOSE VACCINE: CPT | Mod: PBBFAC | Performed by: FAMILY MEDICINE

## 2021-07-23 ENCOUNTER — PATIENT MESSAGE (OUTPATIENT)
Dept: INTERNAL MEDICINE | Facility: CLINIC | Age: 36
End: 2021-07-23

## 2021-07-23 ENCOUNTER — TELEPHONE (OUTPATIENT)
Dept: INTERNAL MEDICINE | Facility: CLINIC | Age: 36
End: 2021-07-23

## 2021-07-23 DIAGNOSIS — Z34.90 PREGNANCY, UNSPECIFIED GESTATIONAL AGE: Primary | ICD-10-CM

## 2021-07-25 ENCOUNTER — PATIENT MESSAGE (OUTPATIENT)
Dept: OBSTETRICS AND GYNECOLOGY | Facility: CLINIC | Age: 36
End: 2021-07-25

## 2021-07-27 ENCOUNTER — HOSPITAL ENCOUNTER (EMERGENCY)
Facility: HOSPITAL | Age: 36
Discharge: HOME | End: 2021-07-27
Attending: EMERGENCY MEDICINE
Payer: COMMERCIAL

## 2021-07-27 VITALS
HEIGHT: 65 IN | SYSTOLIC BLOOD PRESSURE: 124 MMHG | TEMPERATURE: 99 F | DIASTOLIC BLOOD PRESSURE: 69 MMHG | WEIGHT: 176 LBS | HEART RATE: 63 BPM | BODY MASS INDEX: 29.32 KG/M2 | OXYGEN SATURATION: 100 % | RESPIRATION RATE: 17 BRPM

## 2021-07-27 DIAGNOSIS — O20.0 THREATENED MISCARRIAGE IN EARLY PREGNANCY: ICD-10-CM

## 2021-07-27 DIAGNOSIS — N93.9 VAGINAL BLEEDING: ICD-10-CM

## 2021-07-27 DIAGNOSIS — O36.80X0 PREGNANCY, LOCATION UNKNOWN: Primary | ICD-10-CM

## 2021-07-27 LAB
ANION GAP SERPL CALC-SCNC: 8 MMOL/L (ref 8–16)
B-HCG UR QL: POSITIVE
BACTERIA #/AREA URNS AUTO: ABNORMAL /HPF
BASOPHILS # BLD AUTO: 0.03 K/UL (ref 0–0.2)
BASOPHILS NFR BLD: 0.4 % (ref 0–1.9)
BILIRUB UR QL STRIP: NEGATIVE
CALCIUM SERPL-MCNC: 8.8 MG/DL (ref 8.7–10.5)
CHLORIDE SERPL-SCNC: 109 MMOL/L (ref 95–110)
CLARITY UR REFRACT.AUTO: ABNORMAL
CO2 SERPL-SCNC: 25 MMOL/L (ref 23–29)
COLOR UR AUTO: YELLOW
CREAT SERPL-MCNC: 0.6 MG/DL (ref 0.5–1.4)
DIFFERENTIAL METHOD: ABNORMAL
EOSINOPHIL # BLD AUTO: 0.2 K/UL (ref 0–0.5)
EOSINOPHIL NFR BLD: 2.5 % (ref 0–8)
ERYTHROCYTE [DISTWIDTH] IN BLOOD BY AUTOMATED COUNT: 14.5 % (ref 11.5–14.5)
EST. GFR  (AFRICAN AMERICAN): >60 ML/MIN/1.73 M^2
EST. GFR  (NON AFRICAN AMERICAN): >60 ML/MIN/1.73 M^2
GLUCOSE SERPL-MCNC: 92 MG/DL (ref 70–110)
GLUCOSE UR QL STRIP: NEGATIVE
HCG INTACT+B SERPL-ACNC: 452 MIU/ML
HCT VFR BLD AUTO: 33 % (ref 37–48.5)
HGB BLD-MCNC: 10.4 G/DL (ref 12–16)
HGB UR QL STRIP: ABNORMAL
HYALINE CASTS UR QL AUTO: 0 /LPF
IMM GRANULOCYTES # BLD AUTO: 0.01 K/UL (ref 0–0.04)
IMM GRANULOCYTES NFR BLD AUTO: 0.1 % (ref 0–0.5)
KETONES UR QL STRIP: NEGATIVE
LEUKOCYTE ESTERASE UR QL STRIP: ABNORMAL
LYMPHOCYTES # BLD AUTO: 3.2 K/UL (ref 1–4.8)
LYMPHOCYTES NFR BLD: 48.2 % (ref 18–48)
MCH RBC QN AUTO: 29 PG (ref 27–31)
MCHC RBC AUTO-ENTMCNC: 31.5 G/DL (ref 32–36)
MCV RBC AUTO: 92 FL (ref 82–98)
MICROSCOPIC COMMENT: ABNORMAL
MONOCYTES # BLD AUTO: 0.5 K/UL (ref 0.3–1)
MONOCYTES NFR BLD: 7.8 % (ref 4–15)
NEUTROPHILS # BLD AUTO: 2.7 K/UL (ref 1.8–7.7)
NEUTROPHILS NFR BLD: 41 % (ref 38–73)
NITRITE UR QL STRIP: NEGATIVE
NRBC BLD-RTO: 0 /100 WBC
PH UR STRIP: 7 [PH] (ref 5–8)
PLATELET # BLD AUTO: 356 K/UL (ref 150–450)
PMV BLD AUTO: 9 FL (ref 9.2–12.9)
POTASSIUM SERPL-SCNC: 3.8 MMOL/L (ref 3.5–5.1)
PROT UR QL STRIP: ABNORMAL
RBC # BLD AUTO: 3.59 M/UL (ref 4–5.4)
RBC #/AREA URNS AUTO: 90 /HPF (ref 0–4)
SODIUM SERPL-SCNC: 142 MMOL/L (ref 136–145)
SP GR UR STRIP: 1.02 (ref 1–1.03)
URN SPEC COLLECT METH UR: ABNORMAL
UROBILINOGEN UR STRIP-ACNC: 1 EU/DL
UUN UR-MCNC: 22 MG/DL (ref 7–17)
WBC # BLD AUTO: 6.7 K/UL (ref 3.9–12.7)
WBC #/AREA URNS AUTO: 0 /HPF (ref 0–5)

## 2021-07-27 PROCEDURE — 85025 COMPLETE CBC W/AUTO DIFF WBC: CPT | Mod: ER | Performed by: EMERGENCY MEDICINE

## 2021-07-27 PROCEDURE — 99284 EMERGENCY DEPT VISIT MOD MDM: CPT | Mod: 25,ER

## 2021-07-27 PROCEDURE — 84702 CHORIONIC GONADOTROPIN TEST: CPT | Mod: ER | Performed by: EMERGENCY MEDICINE

## 2021-07-27 PROCEDURE — 81000 URINALYSIS NONAUTO W/SCOPE: CPT | Mod: ER | Performed by: EMERGENCY MEDICINE

## 2021-07-27 PROCEDURE — 80048 BASIC METABOLIC PNL TOTAL CA: CPT | Mod: ER | Performed by: EMERGENCY MEDICINE

## 2021-07-27 PROCEDURE — 81025 URINE PREGNANCY TEST: CPT | Mod: ER | Performed by: EMERGENCY MEDICINE

## 2021-07-30 ENCOUNTER — TELEPHONE (OUTPATIENT)
Dept: OBSTETRICS AND GYNECOLOGY | Facility: CLINIC | Age: 36
End: 2021-07-30

## 2021-07-30 DIAGNOSIS — O20.0 THREATENED ABORTION: Primary | ICD-10-CM

## 2021-08-02 ENCOUNTER — LAB VISIT (OUTPATIENT)
Dept: LAB | Facility: HOSPITAL | Age: 36
End: 2021-08-02
Attending: OBSTETRICS & GYNECOLOGY
Payer: COMMERCIAL

## 2021-08-02 DIAGNOSIS — O20.0 THREATENED ABORTION: ICD-10-CM

## 2021-08-02 LAB — HCG INTACT+B SERPL-ACNC: 52 MIU/ML

## 2021-08-02 PROCEDURE — 36415 COLL VENOUS BLD VENIPUNCTURE: CPT | Performed by: OBSTETRICS & GYNECOLOGY

## 2021-08-02 PROCEDURE — 84702 CHORIONIC GONADOTROPIN TEST: CPT | Performed by: OBSTETRICS & GYNECOLOGY

## 2021-08-03 ENCOUNTER — TELEPHONE (OUTPATIENT)
Dept: OBSTETRICS AND GYNECOLOGY | Facility: CLINIC | Age: 36
End: 2021-08-03

## 2021-08-24 RX ORDER — KETOROLAC TROMETHAMINE 10 MG/1
10 TABLET, FILM COATED ORAL 3 TIMES DAILY PRN
Qty: 30 TABLET | Refills: 0 | Status: SHIPPED | OUTPATIENT
Start: 2021-08-24 | End: 2021-08-24

## 2021-08-24 RX ORDER — KETOROLAC TROMETHAMINE 10 MG/1
10 TABLET, FILM COATED ORAL EVERY 8 HOURS PRN
Qty: 30 TABLET | Refills: 0 | Status: SHIPPED | OUTPATIENT
Start: 2021-08-24 | End: 2021-09-03

## 2021-08-24 RX ORDER — KETOROLAC TROMETHAMINE 10 MG/1
10 TABLET, FILM COATED ORAL EVERY 6 HOURS PRN
Qty: 30 TABLET | Refills: 1 | Status: SHIPPED | OUTPATIENT
Start: 2021-08-24 | End: 2021-08-24

## 2021-08-24 RX ORDER — METHOCARBAMOL 500 MG/1
1000 TABLET, FILM COATED ORAL 3 TIMES DAILY
Qty: 30 TABLET | Refills: 0 | Status: SHIPPED | OUTPATIENT
Start: 2021-08-24 | End: 2021-08-29

## 2021-10-04 ENCOUNTER — PATIENT MESSAGE (OUTPATIENT)
Dept: ADMINISTRATIVE | Facility: HOSPITAL | Age: 36
End: 2021-10-04

## 2021-11-11 ENCOUNTER — OFFICE VISIT (OUTPATIENT)
Dept: FAMILY MEDICINE | Facility: CLINIC | Age: 36
End: 2021-11-11
Payer: COMMERCIAL

## 2021-11-11 VITALS
WEIGHT: 176 LBS | OXYGEN SATURATION: 100 % | HEIGHT: 65 IN | HEART RATE: 68 BPM | SYSTOLIC BLOOD PRESSURE: 122 MMHG | DIASTOLIC BLOOD PRESSURE: 80 MMHG | BODY MASS INDEX: 29.32 KG/M2

## 2021-11-11 DIAGNOSIS — F90.2 ATTENTION DEFICIT HYPERACTIVITY DISORDER (ADHD), COMBINED TYPE: Primary | ICD-10-CM

## 2021-11-11 PROCEDURE — 1159F PR MEDICATION LIST DOCUMENTED IN MEDICAL RECORD: ICD-10-PCS | Mod: CPTII,S$GLB,, | Performed by: FAMILY MEDICINE

## 2021-11-11 PROCEDURE — 1159F MED LIST DOCD IN RCRD: CPT | Mod: CPTII,S$GLB,, | Performed by: FAMILY MEDICINE

## 2021-11-11 PROCEDURE — 3074F PR MOST RECENT SYSTOLIC BLOOD PRESSURE < 130 MM HG: ICD-10-PCS | Mod: CPTII,S$GLB,, | Performed by: FAMILY MEDICINE

## 2021-11-11 PROCEDURE — 3079F DIAST BP 80-89 MM HG: CPT | Mod: CPTII,S$GLB,, | Performed by: FAMILY MEDICINE

## 2021-11-11 PROCEDURE — 99214 OFFICE O/P EST MOD 30 MIN: CPT | Mod: S$GLB,,, | Performed by: FAMILY MEDICINE

## 2021-11-11 PROCEDURE — 99214 PR OFFICE/OUTPT VISIT, EST, LEVL IV, 30-39 MIN: ICD-10-PCS | Mod: S$GLB,,, | Performed by: FAMILY MEDICINE

## 2021-11-11 PROCEDURE — 3079F PR MOST RECENT DIASTOLIC BLOOD PRESSURE 80-89 MM HG: ICD-10-PCS | Mod: CPTII,S$GLB,, | Performed by: FAMILY MEDICINE

## 2021-11-11 PROCEDURE — 3074F SYST BP LT 130 MM HG: CPT | Mod: CPTII,S$GLB,, | Performed by: FAMILY MEDICINE

## 2021-11-11 PROCEDURE — 3008F BODY MASS INDEX DOCD: CPT | Mod: CPTII,S$GLB,, | Performed by: FAMILY MEDICINE

## 2021-11-11 PROCEDURE — 3008F PR BODY MASS INDEX (BMI) DOCUMENTED: ICD-10-PCS | Mod: CPTII,S$GLB,, | Performed by: FAMILY MEDICINE

## 2021-11-11 RX ORDER — DEXTROAMPHETAMINE SACCHARATE, AMPHETAMINE ASPARTATE, DEXTROAMPHETAMINE SULFATE AND AMPHETAMINE SULFATE 2.5; 2.5; 2.5; 2.5 MG/1; MG/1; MG/1; MG/1
1 TABLET ORAL 2 TIMES DAILY
Qty: 60 TABLET | Refills: 0 | Status: SHIPPED | OUTPATIENT
Start: 2021-11-11 | End: 2021-12-15 | Stop reason: SDUPTHER

## 2021-11-25 ENCOUNTER — PATIENT MESSAGE (OUTPATIENT)
Dept: FAMILY MEDICINE | Facility: CLINIC | Age: 36
End: 2021-11-25
Payer: COMMERCIAL

## 2021-12-15 ENCOUNTER — OFFICE VISIT (OUTPATIENT)
Dept: FAMILY MEDICINE | Facility: CLINIC | Age: 36
End: 2021-12-15
Payer: COMMERCIAL

## 2021-12-15 DIAGNOSIS — H53.9 VISUAL DISTURBANCE: ICD-10-CM

## 2021-12-15 DIAGNOSIS — F17.200 SMOKES: ICD-10-CM

## 2021-12-15 DIAGNOSIS — F90.2 ATTENTION DEFICIT HYPERACTIVITY DISORDER (ADHD), COMBINED TYPE: Primary | ICD-10-CM

## 2021-12-15 DIAGNOSIS — F41.9 ANXIETY: ICD-10-CM

## 2021-12-15 DIAGNOSIS — Z86.32 HISTORY OF GESTATIONAL DIABETES: ICD-10-CM

## 2021-12-15 DIAGNOSIS — K21.9 GASTROESOPHAGEAL REFLUX DISEASE, UNSPECIFIED WHETHER ESOPHAGITIS PRESENT: ICD-10-CM

## 2021-12-15 PROBLEM — O10.919 HTN IN PREGNANCY, CHRONIC: Status: RESOLVED | Noted: 2017-07-26 | Resolved: 2021-12-15

## 2021-12-15 PROBLEM — O16.9 HYPERTENSION AFFECTING PREGNANCY: Status: RESOLVED | Noted: 2017-07-24 | Resolved: 2021-12-15

## 2021-12-15 PROBLEM — O10.919 CHRONIC HYPERTENSION AFFECTING PREGNANCY: Status: RESOLVED | Noted: 2017-07-20 | Resolved: 2021-12-15

## 2021-12-15 PROBLEM — O13.9 HYPERTENSION OF PREGNANCY, TRANSIENT, ANTEPARTUM: Status: RESOLVED | Noted: 2017-06-06 | Resolved: 2021-12-15

## 2021-12-15 PROCEDURE — 99214 PR OFFICE/OUTPT VISIT, EST, LEVL IV, 30-39 MIN: ICD-10-PCS | Mod: 95,,, | Performed by: FAMILY MEDICINE

## 2021-12-15 PROCEDURE — 99214 OFFICE O/P EST MOD 30 MIN: CPT | Mod: 95,,, | Performed by: FAMILY MEDICINE

## 2021-12-15 RX ORDER — OMEPRAZOLE 20 MG/1
20 CAPSULE, DELAYED RELEASE ORAL DAILY
Qty: 90 CAPSULE | Refills: 3 | Status: SHIPPED | OUTPATIENT
Start: 2021-12-15 | End: 2022-01-09 | Stop reason: SDUPTHER

## 2021-12-15 RX ORDER — DEXTROAMPHETAMINE SACCHARATE, AMPHETAMINE ASPARTATE, DEXTROAMPHETAMINE SULFATE AND AMPHETAMINE SULFATE 2.5; 2.5; 2.5; 2.5 MG/1; MG/1; MG/1; MG/1
1 TABLET ORAL 2 TIMES DAILY
Qty: 60 TABLET | Refills: 0 | Status: SHIPPED | OUTPATIENT
Start: 2021-12-15 | End: 2022-01-09 | Stop reason: SDUPTHER

## 2021-12-26 ENCOUNTER — LAB VISIT (OUTPATIENT)
Dept: PRIMARY CARE CLINIC | Facility: OTHER | Age: 36
End: 2021-12-26

## 2021-12-26 DIAGNOSIS — R50.9 FEVER, UNSPECIFIED FEVER CAUSE: Primary | ICD-10-CM

## 2021-12-26 LAB
CTP QC/QA: YES
SARS-COV-2 AG RESP QL IA.RAPID: POSITIVE

## 2021-12-29 ENCOUNTER — PATIENT MESSAGE (OUTPATIENT)
Dept: INTERNAL MEDICINE | Facility: CLINIC | Age: 36
End: 2021-12-29
Payer: COMMERCIAL

## 2022-01-25 ENCOUNTER — TELEPHONE (OUTPATIENT)
Dept: OPTOMETRY | Facility: CLINIC | Age: 37
End: 2022-01-25
Payer: COMMERCIAL

## 2022-02-15 RX ORDER — DEXTROAMPHETAMINE SACCHARATE, AMPHETAMINE ASPARTATE, DEXTROAMPHETAMINE SULFATE AND AMPHETAMINE SULFATE 2.5; 2.5; 2.5; 2.5 MG/1; MG/1; MG/1; MG/1
1 TABLET ORAL 2 TIMES DAILY
Qty: 60 TABLET | Refills: 0 | Status: SHIPPED | OUTPATIENT
Start: 2022-02-15 | End: 2022-03-29 | Stop reason: SDUPTHER

## 2022-02-15 NOTE — TELEPHONE ENCOUNTER
Care Due:                  Date            Visit Type   Department     Provider  --------------------------------------------------------------------------------                                ESTABLISHED                              PATIENT -    [facility] INTERNAL  Last Visit: 08-      [facility]       Ronda  Jasperena  Next Visit: None Scheduled  None         None Found                                                            Last  Test          Frequency    Reason                     Performed    Due Date  --------------------------------------------------------------------------------    Office Visit  12 months..  omeprazole...............  08- 07-    Powered by [employer]. Reference number: [id number].   2/15/2022 7:16:40 AM CST

## 2022-02-22 ENCOUNTER — PATIENT MESSAGE (OUTPATIENT)
Dept: RESEARCH | Facility: HOSPITAL | Age: 37
End: 2022-02-22
Payer: COMMERCIAL

## 2022-02-25 ENCOUNTER — TELEPHONE (OUTPATIENT)
Dept: OPTOMETRY | Facility: CLINIC | Age: 37
End: 2022-02-25
Payer: COMMERCIAL

## 2022-03-29 ENCOUNTER — PATIENT MESSAGE (OUTPATIENT)
Dept: FAMILY MEDICINE | Facility: CLINIC | Age: 37
End: 2022-03-29
Payer: COMMERCIAL

## 2022-03-31 ENCOUNTER — PATIENT MESSAGE (OUTPATIENT)
Dept: FAMILY MEDICINE | Facility: CLINIC | Age: 37
End: 2022-03-31
Payer: COMMERCIAL

## 2022-04-01 RX ORDER — LISDEXAMFETAMINE DIMESYLATE CAPSULES 20 MG/1
20 CAPSULE ORAL EVERY MORNING
Qty: 30 CAPSULE | Refills: 0 | Status: SHIPPED | OUTPATIENT
Start: 2022-04-01 | End: 2022-05-03 | Stop reason: SDUPTHER

## 2022-04-01 NOTE — TELEPHONE ENCOUNTER
Can you change PCP to me, as Dr Chacon is getting messages from the pt and having to redirect them to me.

## 2022-04-26 ENCOUNTER — PATIENT MESSAGE (OUTPATIENT)
Dept: FAMILY MEDICINE | Facility: CLINIC | Age: 37
End: 2022-04-26
Payer: COMMERCIAL

## 2022-04-27 ENCOUNTER — PATIENT MESSAGE (OUTPATIENT)
Dept: FAMILY MEDICINE | Facility: CLINIC | Age: 37
End: 2022-04-27
Payer: COMMERCIAL

## 2022-04-28 ENCOUNTER — OFFICE VISIT (OUTPATIENT)
Dept: PODIATRY | Facility: CLINIC | Age: 37
End: 2022-04-28
Payer: COMMERCIAL

## 2022-04-28 ENCOUNTER — LAB VISIT (OUTPATIENT)
Dept: LAB | Facility: HOSPITAL | Age: 37
End: 2022-04-28
Attending: FAMILY MEDICINE
Payer: COMMERCIAL

## 2022-04-28 ENCOUNTER — HOSPITAL ENCOUNTER (OUTPATIENT)
Dept: RADIOLOGY | Facility: HOSPITAL | Age: 37
Discharge: HOME | End: 2022-04-28
Attending: PODIATRIST
Payer: COMMERCIAL

## 2022-04-28 VITALS
HEIGHT: 65 IN | SYSTOLIC BLOOD PRESSURE: 134 MMHG | DIASTOLIC BLOOD PRESSURE: 86 MMHG | HEART RATE: 82 BPM | BODY MASS INDEX: 29.32 KG/M2 | WEIGHT: 176 LBS

## 2022-04-28 DIAGNOSIS — H53.9 VISUAL DISTURBANCE: ICD-10-CM

## 2022-04-28 DIAGNOSIS — M20.41 HAMMERTOE OF SECOND TOE OF RIGHT FOOT: ICD-10-CM

## 2022-04-28 DIAGNOSIS — M21.619 HALLUX ABDUCTOVALGUS WITH BUNIONS, UNSPECIFIED LATERALITY: Primary | ICD-10-CM

## 2022-04-28 DIAGNOSIS — M20.10 HALLUX ABDUCTOVALGUS WITH BUNIONS, UNSPECIFIED LATERALITY: Primary | ICD-10-CM

## 2022-04-28 DIAGNOSIS — M20.10 HALLUX ABDUCTOVALGUS WITH BUNIONS, UNSPECIFIED LATERALITY: ICD-10-CM

## 2022-04-28 DIAGNOSIS — F41.9 ANXIETY: ICD-10-CM

## 2022-04-28 DIAGNOSIS — Z86.32 HISTORY OF GESTATIONAL DIABETES: ICD-10-CM

## 2022-04-28 DIAGNOSIS — M21.619 HALLUX ABDUCTOVALGUS WITH BUNIONS, UNSPECIFIED LATERALITY: ICD-10-CM

## 2022-04-28 LAB
25(OH)D3+25(OH)D2 SERPL-MCNC: 14 NG/ML (ref 30–96)
ALBUMIN SERPL BCP-MCNC: 3.8 G/DL (ref 3.5–5.2)
ALP SERPL-CCNC: 78 U/L (ref 55–135)
ALT SERPL W/O P-5'-P-CCNC: 23 U/L (ref 10–44)
ANION GAP SERPL CALC-SCNC: 8 MMOL/L (ref 8–16)
AST SERPL-CCNC: 15 U/L (ref 10–40)
BASOPHILS # BLD AUTO: 0.02 K/UL (ref 0–0.2)
BASOPHILS NFR BLD: 0.3 % (ref 0–1.9)
BILIRUB SERPL-MCNC: 0.3 MG/DL (ref 0.1–1)
BUN SERPL-MCNC: 24 MG/DL (ref 6–20)
CALCIUM SERPL-MCNC: 9.6 MG/DL (ref 8.7–10.5)
CHLORIDE SERPL-SCNC: 109 MMOL/L (ref 95–110)
CHOLEST SERPL-MCNC: 143 MG/DL (ref 120–199)
CHOLEST/HDLC SERPL: 2.4 {RATIO} (ref 2–5)
CO2 SERPL-SCNC: 24 MMOL/L (ref 23–29)
CREAT SERPL-MCNC: 0.7 MG/DL (ref 0.5–1.4)
DIFFERENTIAL METHOD: ABNORMAL
EOSINOPHIL # BLD AUTO: 0.2 K/UL (ref 0–0.5)
EOSINOPHIL NFR BLD: 2.9 % (ref 0–8)
ERYTHROCYTE [DISTWIDTH] IN BLOOD BY AUTOMATED COUNT: 14.4 % (ref 11.5–14.5)
EST. GFR  (AFRICAN AMERICAN): >60 ML/MIN/1.73 M^2
EST. GFR  (NON AFRICAN AMERICAN): >60 ML/MIN/1.73 M^2
ESTIMATED AVG GLUCOSE: 108 MG/DL (ref 68–131)
GLUCOSE SERPL-MCNC: 86 MG/DL (ref 70–110)
HBA1C MFR BLD: 5.4 % (ref 4–5.6)
HCT VFR BLD AUTO: 35 % (ref 37–48.5)
HDLC SERPL-MCNC: 60 MG/DL (ref 40–75)
HDLC SERPL: 42 % (ref 20–50)
HGB BLD-MCNC: 10.7 G/DL (ref 12–16)
IMM GRANULOCYTES # BLD AUTO: 0.02 K/UL (ref 0–0.04)
IMM GRANULOCYTES NFR BLD AUTO: 0.3 % (ref 0–0.5)
LDLC SERPL CALC-MCNC: 74.8 MG/DL (ref 63–159)
LYMPHOCYTES # BLD AUTO: 2.2 K/UL (ref 1–4.8)
LYMPHOCYTES NFR BLD: 30.4 % (ref 18–48)
MCH RBC QN AUTO: 26.4 PG (ref 27–31)
MCHC RBC AUTO-ENTMCNC: 30.6 G/DL (ref 32–36)
MCV RBC AUTO: 86 FL (ref 82–98)
MONOCYTES # BLD AUTO: 0.5 K/UL (ref 0.3–1)
MONOCYTES NFR BLD: 7.2 % (ref 4–15)
NEUTROPHILS # BLD AUTO: 4.3 K/UL (ref 1.8–7.7)
NEUTROPHILS NFR BLD: 58.9 % (ref 38–73)
NONHDLC SERPL-MCNC: 83 MG/DL
NRBC BLD-RTO: 0 /100 WBC
PLATELET # BLD AUTO: 480 K/UL (ref 150–450)
PMV BLD AUTO: 9 FL (ref 9.2–12.9)
POTASSIUM SERPL-SCNC: 3.8 MMOL/L (ref 3.5–5.1)
PROT SERPL-MCNC: 7.7 G/DL (ref 6–8.4)
RBC # BLD AUTO: 4.05 M/UL (ref 4–5.4)
SODIUM SERPL-SCNC: 141 MMOL/L (ref 136–145)
TRIGL SERPL-MCNC: 41 MG/DL (ref 30–150)
TSH SERPL DL<=0.005 MIU/L-ACNC: 1.13 UIU/ML (ref 0.4–4)
WBC # BLD AUTO: 7.24 K/UL (ref 3.9–12.7)

## 2022-04-28 PROCEDURE — 3075F PR MOST RECENT SYSTOLIC BLOOD PRESS GE 130-139MM HG: ICD-10-PCS | Mod: CPTII,S$GLB,, | Performed by: PODIATRIST

## 2022-04-28 PROCEDURE — 99999 PR PBB SHADOW E&M-EST. PATIENT-LVL III: CPT | Mod: PBBFAC,,, | Performed by: PODIATRIST

## 2022-04-28 PROCEDURE — 73630 X-RAY EXAM OF FOOT: CPT | Mod: 26,,, | Performed by: RADIOLOGY

## 2022-04-28 PROCEDURE — 85025 COMPLETE CBC W/AUTO DIFF WBC: CPT | Performed by: FAMILY MEDICINE

## 2022-04-28 PROCEDURE — 3079F PR MOST RECENT DIASTOLIC BLOOD PRESSURE 80-89 MM HG: ICD-10-PCS | Mod: CPTII,S$GLB,, | Performed by: PODIATRIST

## 2022-04-28 PROCEDURE — 3008F PR BODY MASS INDEX (BMI) DOCUMENTED: ICD-10-PCS | Mod: CPTII,S$GLB,, | Performed by: PODIATRIST

## 2022-04-28 PROCEDURE — 3075F SYST BP GE 130 - 139MM HG: CPT | Mod: CPTII,S$GLB,, | Performed by: PODIATRIST

## 2022-04-28 PROCEDURE — 1159F MED LIST DOCD IN RCRD: CPT | Mod: CPTII,S$GLB,, | Performed by: PODIATRIST

## 2022-04-28 PROCEDURE — 80061 LIPID PANEL: CPT | Performed by: FAMILY MEDICINE

## 2022-04-28 PROCEDURE — 3008F BODY MASS INDEX DOCD: CPT | Mod: CPTII,S$GLB,, | Performed by: PODIATRIST

## 2022-04-28 PROCEDURE — 36415 COLL VENOUS BLD VENIPUNCTURE: CPT | Performed by: FAMILY MEDICINE

## 2022-04-28 PROCEDURE — 82306 VITAMIN D 25 HYDROXY: CPT | Performed by: FAMILY MEDICINE

## 2022-04-28 PROCEDURE — 80053 COMPREHEN METABOLIC PANEL: CPT | Performed by: FAMILY MEDICINE

## 2022-04-28 PROCEDURE — 3044F HG A1C LEVEL LT 7.0%: CPT | Mod: CPTII,S$GLB,, | Performed by: PODIATRIST

## 2022-04-28 PROCEDURE — 1159F PR MEDICATION LIST DOCUMENTED IN MEDICAL RECORD: ICD-10-PCS | Mod: CPTII,S$GLB,, | Performed by: PODIATRIST

## 2022-04-28 PROCEDURE — 3044F PR MOST RECENT HEMOGLOBIN A1C LEVEL <7.0%: ICD-10-PCS | Mod: CPTII,S$GLB,, | Performed by: PODIATRIST

## 2022-04-28 PROCEDURE — 99203 PR OFFICE/OUTPT VISIT, NEW, LEVL III, 30-44 MIN: ICD-10-PCS | Mod: S$GLB,,, | Performed by: PODIATRIST

## 2022-04-28 PROCEDURE — 73630 X-RAY EXAM OF FOOT: CPT | Mod: TC,50,PN

## 2022-04-28 PROCEDURE — 3079F DIAST BP 80-89 MM HG: CPT | Mod: CPTII,S$GLB,, | Performed by: PODIATRIST

## 2022-04-28 PROCEDURE — 99203 OFFICE O/P NEW LOW 30 MIN: CPT | Mod: S$GLB,,, | Performed by: PODIATRIST

## 2022-04-28 PROCEDURE — 73630 XR FOOT COMPLETE 3 VIEW BILATERAL: ICD-10-PCS | Mod: 26,,, | Performed by: RADIOLOGY

## 2022-04-28 PROCEDURE — 84443 ASSAY THYROID STIM HORMONE: CPT | Performed by: FAMILY MEDICINE

## 2022-04-28 PROCEDURE — 1160F PR REVIEW ALL MEDS BY PRESCRIBER/CLIN PHARMACIST DOCUMENTED: ICD-10-PCS | Mod: CPTII,S$GLB,, | Performed by: PODIATRIST

## 2022-04-28 PROCEDURE — 99999 PR PBB SHADOW E&M-EST. PATIENT-LVL III: ICD-10-PCS | Mod: PBBFAC,,, | Performed by: PODIATRIST

## 2022-04-28 PROCEDURE — 1160F RVW MEDS BY RX/DR IN RCRD: CPT | Mod: CPTII,S$GLB,, | Performed by: PODIATRIST

## 2022-04-28 PROCEDURE — 83036 HEMOGLOBIN GLYCOSYLATED A1C: CPT | Performed by: FAMILY MEDICINE

## 2022-04-28 NOTE — PROGRESS NOTES
"Subjective:      Patient ID: Corrine Gan is a 37 y.o. female.    Chief Complaint: Foot Problem (Has bunions and hammer toes)    Presents today complaining of worsening painful bunions on both feet and hammertoe to the right 2nd toe that she has noticed since she was at least 20 years old.  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  Relates that her pain worsens words in the day with standing or walking.  She routinely wears Nike tennis shoes.  Denies any trauma.  Also complains that her nails are thickened and growing a superior direction on select toes.    Vitals:    22 1000   BP: 134/86   Pulse: 82   Weight: 79.8 kg (176 lb)   Height: 5' 5" (1.651 m)   PainSc:   4   PainLoc: Foot      Past Medical History:   Diagnosis Date    Coronary artery disease     large heart noted on CXR , No problems    Diabetes mellitus     previous and current pregnancy, diet controlled. Gestational DM only    Gestational diabetes     Hypertension     preEclampsia    Pregnancy     x 2; preecalmpsia for second one       Past Surgical History:   Procedure Laterality Date     SECTION      gastric sleeve      TONSILLECTOMY         Family History   Problem Relation Age of Onset    Diabetes Mother     Hypertension Father     Diabetes Maternal Grandmother        Social History     Socioeconomic History    Marital status: Single   Tobacco Use    Smoking status: Former Smoker     Packs/day: 0.50     Types: Cigarettes    Smokeless tobacco: Never Used   Substance and Sexual Activity    Alcohol use: No    Drug use: No    Sexual activity: Yes     Social Determinants of Health     Financial Resource Strain: Low Risk     Difficulty of Paying Living Expenses: Not hard at all   Food Insecurity: No Food Insecurity    Worried About Running Out of Food in the Last Year: Never true    Ran Out of Food in the Last Year: Never true   Transportation Needs: Unmet Transportation Needs    " Lack of Transportation (Medical): Yes    Lack of Transportation (Non-Medical): Yes   Physical Activity: Sufficiently Active    Days of Exercise per Week: 4 days    Minutes of Exercise per Session: 60 min   Stress: Stress Concern Present    Feeling of Stress : To some extent   Social Connections: Unknown    Frequency of Communication with Friends and Family: More than three times a week    Frequency of Social Gatherings with Friends and Family: Twice a week    Active Member of Clubs or Organizations: No    Attends Club or Organization Meetings: Patient refused    Marital Status:    Housing Stability: Unknown    Unable to Pay for Housing in the Last Year: No    Unstable Housing in the Last Year: No       Current Outpatient Medications   Medication Sig Dispense Refill    lisdexamfetamine (VYVANSE) 20 MG capsule Take 1 capsule (20 mg total) by mouth every morning. For ADD 30 capsule 0    omeprazole (PRILOSEC) 20 MG capsule Take 1 capsule (20 mg total) by mouth once daily. 90 capsule 3     No current facility-administered medications for this visit.       Review of patient's allergies indicates:  No Known Allergies        Review of Systems   Constitutional: Negative for chills, fever and malaise/fatigue.   HENT: Negative for congestion and hearing loss.    Cardiovascular: Negative for chest pain, claudication and leg swelling.   Respiratory: Negative for cough and shortness of breath.    Skin: Positive for nail changes. Negative for color change and itching.   Musculoskeletal: Negative for back pain, joint pain, muscle cramps and muscle weakness.   Gastrointestinal: Negative for nausea and vomiting.   Neurological: Negative for numbness, paresthesias and weakness.   Psychiatric/Behavioral: Negative for altered mental status.           Objective:      Physical Exam  Constitutional:       General: She is not in acute distress.     Appearance: Normal appearance. She is not ill-appearing.    Cardiovascular:      Pulses:           Dorsalis pedis pulses are 2+ on the right side and 2+ on the left side.        Posterior tibial pulses are 2+ on the right side and 2+ on the left side.      Comments: No lower extremity edema bilateral.  Skin temp is warm to foot bilateral.  Musculoskeletal:      Comments: Hallux abductovalgus bilateral foot under Lapping the 2nd toe right greater than left.  Note 1st ray dorsal excursion greater 1 cm with instability noted bilateral.  Semi rigid flexion contracture of the right 2nd toe PIPJ.    Overall rectus appearing foot type.  Note reduced range of motion to the ankle with less than 0° of dorsiflexion with the knee extended that reduces to 5-10 degrees of dorsiflexion with the knee flexed.    No pain with range of motion or manual muscle strength testing bilateral foot ankle.  One MTP range of motion is noted to be within normal limits however reduced with 1st ray loading.  No pain during range of motion of the 1 MTP bilateral.  Negative Lachman test right 2nd toe MTP.   Skin:     General: Skin is warm.      Capillary Refill: Capillary refill takes less than 2 seconds.      Findings: No ecchymosis or erythema.      Nails: There is no clubbing.      Comments: Diffuse plantar tyloma sub met 2 and 3 bilateral foot.   Neurological:      Mental Status: She is alert and oriented to person, place, and time.      Sensory: Sensation is intact.      Motor: Motor function is intact.               Assessment:       Encounter Diagnoses   Name Primary?    Hallux abductovalgus with bunions, unspecified laterality Yes    Hammertoe of second toe of right foot          Plan:       Corrine was seen today for foot problem.    Diagnoses and all orders for this visit:    Hallux abductovalgus with bunions, unspecified laterality  -     X-Ray Foot Complete 3 view Bilateral; Future    Hammertoe of second toe of right foot  -     X-Ray Foot Complete 3 view Bilateral; Future      I counseled the  patient on her conditions, their implications and medical management.    We discussed conservative treatment such as a bunion splint, shoe gear alterations consisting of shoes with adequate room the toe box adequate shank support as discussed.  Recommendations were made.  Patient was sent to or [facility].  Also discussed tentative surgical intervention consisting of gastrocnemius recession with 1st metatarsal cuneiform joint arthrodesis/Lapiplasty, muscle tendon balancing of the 1st metatarsophalangeal joint with PIPJ arthrodesis of the 2nd toe right foot.  Similar procedure excluding the 2nd toe most likely needed on the left side.  Risks, benefits anticipate postop course discussed in detail.  No guarantees were given applied.  Patient like to continue conservative care at this time and consider surgical intervention at a later date when she is able to.    RTC p.r.n. as discussed.  Weight-bearing bilateral foot x-ray with sesamoid axial views ordered to further assess underlying pathology.    A portion of this note was generated by voice recognition software and may contain spelling and grammar errors.      .

## 2022-05-02 ENCOUNTER — PATIENT OUTREACH (OUTPATIENT)
Dept: ADMINISTRATIVE | Facility: OTHER | Age: 37
End: 2022-05-02
Payer: COMMERCIAL

## 2022-05-02 NOTE — PROGRESS NOTES
Health Maintenance Due   Topic Date Due    Hepatitis C Screening  Never done    COVID-19 Vaccine (3 - Booster for Pfizer series) 06/05/2021    Cervical Cancer Screening  01/10/2022     Updates were requested from care everywhere.  Chart was reviewed for overdue Proactive [facility] Encounters (RENEE) topics (CRS, Breast Cancer Screening, Eye exam)  Health Maintenance has been updated.  LINKS immunization registry triggered.  Immunizations were reconciled.

## 2022-05-03 ENCOUNTER — OFFICE VISIT (OUTPATIENT)
Dept: OBSTETRICS AND GYNECOLOGY | Facility: CLINIC | Age: 37
End: 2022-05-03
Payer: COMMERCIAL

## 2022-05-03 ENCOUNTER — PATIENT MESSAGE (OUTPATIENT)
Dept: FAMILY MEDICINE | Facility: CLINIC | Age: 37
End: 2022-05-03
Payer: COMMERCIAL

## 2022-05-03 VITALS
DIASTOLIC BLOOD PRESSURE: 76 MMHG | SYSTOLIC BLOOD PRESSURE: 122 MMHG | BODY MASS INDEX: 31.97 KG/M2 | WEIGHT: 192.13 LBS

## 2022-05-03 DIAGNOSIS — Z01.419 WELL WOMAN EXAM WITH ROUTINE GYNECOLOGICAL EXAM: Primary | ICD-10-CM

## 2022-05-03 PROCEDURE — 99385 PREV VISIT NEW AGE 18-39: CPT | Mod: S$GLB,,, | Performed by: OBSTETRICS & GYNECOLOGY

## 2022-05-03 PROCEDURE — 99385 PR PREVENTIVE VISIT,NEW,18-39: ICD-10-PCS | Mod: S$GLB,,, | Performed by: OBSTETRICS & GYNECOLOGY

## 2022-05-03 PROCEDURE — 88175 CYTOPATH C/V AUTO FLUID REDO: CPT | Performed by: OBSTETRICS & GYNECOLOGY

## 2022-05-03 PROCEDURE — 3078F PR MOST RECENT DIASTOLIC BLOOD PRESSURE < 80 MM HG: ICD-10-PCS | Mod: CPTII,S$GLB,, | Performed by: OBSTETRICS & GYNECOLOGY

## 2022-05-03 PROCEDURE — 3074F SYST BP LT 130 MM HG: CPT | Mod: CPTII,S$GLB,, | Performed by: OBSTETRICS & GYNECOLOGY

## 2022-05-03 PROCEDURE — 3074F PR MOST RECENT SYSTOLIC BLOOD PRESSURE < 130 MM HG: ICD-10-PCS | Mod: CPTII,S$GLB,, | Performed by: OBSTETRICS & GYNECOLOGY

## 2022-05-03 PROCEDURE — 3078F DIAST BP <80 MM HG: CPT | Mod: CPTII,S$GLB,, | Performed by: OBSTETRICS & GYNECOLOGY

## 2022-05-03 PROCEDURE — 3008F PR BODY MASS INDEX (BMI) DOCUMENTED: ICD-10-PCS | Mod: CPTII,S$GLB,, | Performed by: OBSTETRICS & GYNECOLOGY

## 2022-05-03 PROCEDURE — 3044F HG A1C LEVEL LT 7.0%: CPT | Mod: CPTII,S$GLB,, | Performed by: OBSTETRICS & GYNECOLOGY

## 2022-05-03 PROCEDURE — 99999 PR PBB SHADOW E&M-EST. PATIENT-LVL III: ICD-10-PCS | Mod: PBBFAC,,, | Performed by: OBSTETRICS & GYNECOLOGY

## 2022-05-03 PROCEDURE — 1159F MED LIST DOCD IN RCRD: CPT | Mod: CPTII,S$GLB,, | Performed by: OBSTETRICS & GYNECOLOGY

## 2022-05-03 PROCEDURE — 1159F PR MEDICATION LIST DOCUMENTED IN MEDICAL RECORD: ICD-10-PCS | Mod: CPTII,S$GLB,, | Performed by: OBSTETRICS & GYNECOLOGY

## 2022-05-03 PROCEDURE — 1160F PR REVIEW ALL MEDS BY PRESCRIBER/CLIN PHARMACIST DOCUMENTED: ICD-10-PCS | Mod: CPTII,S$GLB,, | Performed by: OBSTETRICS & GYNECOLOGY

## 2022-05-03 PROCEDURE — 99999 PR PBB SHADOW E&M-EST. PATIENT-LVL III: CPT | Mod: PBBFAC,,, | Performed by: OBSTETRICS & GYNECOLOGY

## 2022-05-03 PROCEDURE — 1160F RVW MEDS BY RX/DR IN RCRD: CPT | Mod: CPTII,S$GLB,, | Performed by: OBSTETRICS & GYNECOLOGY

## 2022-05-03 PROCEDURE — 3044F PR MOST RECENT HEMOGLOBIN A1C LEVEL <7.0%: ICD-10-PCS | Mod: CPTII,S$GLB,, | Performed by: OBSTETRICS & GYNECOLOGY

## 2022-05-03 PROCEDURE — 3008F BODY MASS INDEX DOCD: CPT | Mod: CPTII,S$GLB,, | Performed by: OBSTETRICS & GYNECOLOGY

## 2022-05-03 RX ORDER — HYDROCODONE BITARTRATE AND ACETAMINOPHEN 5; 325 MG/1; MG/1
1 TABLET ORAL
COMMUNITY
Start: 2022-02-21 | End: 2022-08-25

## 2022-05-03 NOTE — TELEPHONE ENCOUNTER
Care Due:                  Date            Visit Type   Department     Provider  --------------------------------------------------------------------------------                                ESTABLISHED                              PATIENT -    [facility] INTERNAL  Last Visit: 08-      [facility]       Ronda  Jasperena  Next Visit: None Scheduled  None         None Found                                                            Last  Test          Frequency    Reason                     Performed    Due Date  --------------------------------------------------------------------------------    Office Visit  12 months..  omeprazole...............  08- 07-    [facility] Embedded Care Gaps. Reference number: [id number]. 5/03/2022   5:01:57 PM CDT

## 2022-05-03 NOTE — TELEPHONE ENCOUNTER
Vyvanse PA has been approved.    CESAR [place]: [id number] - PA Case ID: [id number] help? Call us at [phone]  Outcome  Approvedtoday  CaseId:[id number];Status:Approved;Review Type:Prior Auth;Coverage Start Date:04/03/2022;Coverage End Date:05/03/2023;  Drug  Vyvanse 20MG capsules  Form  [employer] Electronic PA Form (2017 NCPDP)

## 2022-05-03 NOTE — PROGRESS NOTES
HPI:   37 y.o.   OB History        3    Para   2    Term   1       1    AB   0    Living   2       SAB   0    IAB   0    Ectopic   0    Multiple   0    Live Births   2              Patient's last menstrual period was 2022 (exact date).    Patient is  here for her annual gynecologic exam.  She has no complaints.     ROS:  GENERAL: No fever, chills, fatigability or weight loss.  SKIN: No rashes, itching or changes in color or texture of skin.  HEAD: No headaches or recent head trauma.  EYES: Visual acuity fine. No photophobia, ocular pain or diplopia.  EARS: Denies ear pain, discharge or vertigo.  NOSE: No loss of smell, no epistaxis or postnasal drip.  MOUTH & THROAT: No hoarseness or change in voice. No excessive gum bleeding.  NODES: Denies swollen glands.  CHEST: Denies SWEET, cyanosis, wheezing, cough and sputum production.  CARDIOVASCULAR: Denies chest pain, PND, orthopnea or reduced exercise tolerance.  ABDOMEN: Appetite fine. No weight loss. Denies diarrhea, abdominal pain, hematemesis or blood in stool.  URINARY: No flank pain, dysuria or hematuria.  PERIPHERAL VASCULAR: No claudication or cyanosis.  MUSCULOSKELETAL: No joint stiffness or swelling. Denies back pain.  NEUROLOGIC: No history of seizures, paralysis, alteration of gait or coordination.    PE:   /76   Wt 87.1 kg (192 lb 2.1 oz)   LMP 2022 (Exact Date)   Breastfeeding No   BMI 31.97 kg/m²   APPEARANCE: Well nourished, well developed, in no acute distress.  NECK: Neck symmetric without masses or thyromegaly.  BREASTS: Symmetrical, no skin changes or visible lesions. No palpable masses, nipple discharge or adenopathy bilaterally.  ABDOMEN: Flat. Soft. No tenderness or masses. No hepatosplenomegaly. No hernias. No CVA tenderness.  VULVA: No lesions. Normal female genitalia.  URETHRAL MEATUS: Normal size and location, no lesions, no prolapse.  URETHRA: No masses, tenderness, prolapse or scarring.  VAGINA: Moist and  well rugated, no discharge, no significant cystocele or rectocele.  CERVIX: No lesions and discharge. PAP done.  UTERUS: Normal size, regular shape, mobile, non-tender, bladder base nontender.  ADNEXA: No masses, tenderness or CDS nodularity.  ANUS PERINEUM: Normal.    PROCEDURES:  Pap smear    Assessment:  Normal Gynecologic Exam    Plan:  Mammogram and Colonoscopy if indicated by current recommendations.  Return to clinic in one year or for any problems or complaints.  Reg but heavy cycles

## 2022-05-05 RX ORDER — LISDEXAMFETAMINE DIMESYLATE CAPSULES 20 MG/1
20 CAPSULE ORAL EVERY MORNING
Qty: 30 CAPSULE | Refills: 0 | Status: SHIPPED | OUTPATIENT
Start: 2022-05-05 | End: 2022-05-16 | Stop reason: SDUPTHER

## 2022-05-16 ENCOUNTER — OFFICE VISIT (OUTPATIENT)
Dept: FAMILY MEDICINE | Facility: CLINIC | Age: 37
End: 2022-05-16
Payer: COMMERCIAL

## 2022-05-16 ENCOUNTER — LAB VISIT (OUTPATIENT)
Dept: LAB | Facility: HOSPITAL | Age: 37
End: 2022-05-16
Attending: FAMILY MEDICINE
Payer: COMMERCIAL

## 2022-05-16 VITALS
HEIGHT: 65 IN | TEMPERATURE: 98 F | DIASTOLIC BLOOD PRESSURE: 70 MMHG | WEIGHT: 187.75 LBS | OXYGEN SATURATION: 98 % | BODY MASS INDEX: 31.28 KG/M2 | HEART RATE: 82 BPM | SYSTOLIC BLOOD PRESSURE: 114 MMHG

## 2022-05-16 DIAGNOSIS — B37.2 YEAST DERMATITIS: Primary | ICD-10-CM

## 2022-05-16 DIAGNOSIS — D64.9 ANEMIA, UNSPECIFIED TYPE: ICD-10-CM

## 2022-05-16 DIAGNOSIS — Z00.00 WELLNESS EXAMINATION: ICD-10-CM

## 2022-05-16 DIAGNOSIS — M79.671 CHRONIC PAIN IN RIGHT FOOT: ICD-10-CM

## 2022-05-16 DIAGNOSIS — E66.09 OBESITY DUE TO EXCESS CALORIES, UNSPECIFIED CLASSIFICATION, UNSPECIFIED WHETHER SERIOUS COMORBIDITY PRESENT: ICD-10-CM

## 2022-05-16 DIAGNOSIS — F90.2 ATTENTION DEFICIT HYPERACTIVITY DISORDER (ADHD), COMBINED TYPE: ICD-10-CM

## 2022-05-16 DIAGNOSIS — G89.29 CHRONIC PAIN IN RIGHT FOOT: ICD-10-CM

## 2022-05-16 LAB — FERRITIN SERPL-MCNC: 4 NG/ML (ref 20–300)

## 2022-05-16 PROCEDURE — 36415 COLL VENOUS BLD VENIPUNCTURE: CPT | Performed by: FAMILY MEDICINE

## 2022-05-16 PROCEDURE — 1160F PR REVIEW ALL MEDS BY PRESCRIBER/CLIN PHARMACIST DOCUMENTED: ICD-10-PCS | Mod: CPTII,S$GLB,, | Performed by: FAMILY MEDICINE

## 2022-05-16 PROCEDURE — 99214 OFFICE O/P EST MOD 30 MIN: CPT | Mod: S$GLB,,, | Performed by: FAMILY MEDICINE

## 2022-05-16 PROCEDURE — 3078F PR MOST RECENT DIASTOLIC BLOOD PRESSURE < 80 MM HG: ICD-10-PCS | Mod: CPTII,S$GLB,, | Performed by: FAMILY MEDICINE

## 2022-05-16 PROCEDURE — 83540 ASSAY OF IRON: CPT | Performed by: FAMILY MEDICINE

## 2022-05-16 PROCEDURE — 3074F PR MOST RECENT SYSTOLIC BLOOD PRESSURE < 130 MM HG: ICD-10-PCS | Mod: CPTII,S$GLB,, | Performed by: FAMILY MEDICINE

## 2022-05-16 PROCEDURE — 3074F SYST BP LT 130 MM HG: CPT | Mod: CPTII,S$GLB,, | Performed by: FAMILY MEDICINE

## 2022-05-16 PROCEDURE — 1160F RVW MEDS BY RX/DR IN RCRD: CPT | Mod: CPTII,S$GLB,, | Performed by: FAMILY MEDICINE

## 2022-05-16 PROCEDURE — 3008F BODY MASS INDEX DOCD: CPT | Mod: CPTII,S$GLB,, | Performed by: FAMILY MEDICINE

## 2022-05-16 PROCEDURE — 82607 VITAMIN B-12: CPT | Performed by: FAMILY MEDICINE

## 2022-05-16 PROCEDURE — 99214 PR OFFICE/OUTPT VISIT, EST, LEVL IV, 30-39 MIN: ICD-10-PCS | Mod: S$GLB,,, | Performed by: FAMILY MEDICINE

## 2022-05-16 PROCEDURE — 3044F PR MOST RECENT HEMOGLOBIN A1C LEVEL <7.0%: ICD-10-PCS | Mod: CPTII,S$GLB,, | Performed by: FAMILY MEDICINE

## 2022-05-16 PROCEDURE — 3008F PR BODY MASS INDEX (BMI) DOCUMENTED: ICD-10-PCS | Mod: CPTII,S$GLB,, | Performed by: FAMILY MEDICINE

## 2022-05-16 PROCEDURE — 3078F DIAST BP <80 MM HG: CPT | Mod: CPTII,S$GLB,, | Performed by: FAMILY MEDICINE

## 2022-05-16 PROCEDURE — 1159F MED LIST DOCD IN RCRD: CPT | Mod: CPTII,S$GLB,, | Performed by: FAMILY MEDICINE

## 2022-05-16 PROCEDURE — 1159F PR MEDICATION LIST DOCUMENTED IN MEDICAL RECORD: ICD-10-PCS | Mod: CPTII,S$GLB,, | Performed by: FAMILY MEDICINE

## 2022-05-16 PROCEDURE — 82728 ASSAY OF FERRITIN: CPT | Performed by: FAMILY MEDICINE

## 2022-05-16 PROCEDURE — 86803 HEPATITIS C AB TEST: CPT | Performed by: FAMILY MEDICINE

## 2022-05-16 PROCEDURE — 3044F HG A1C LEVEL LT 7.0%: CPT | Mod: CPTII,S$GLB,, | Performed by: FAMILY MEDICINE

## 2022-05-16 RX ORDER — LISDEXAMFETAMINE DIMESYLATE CAPSULES 20 MG/1
20 CAPSULE ORAL EVERY MORNING
Qty: 90 CAPSULE | Refills: 0 | Status: SHIPPED | OUTPATIENT
Start: 2022-05-16 | End: 2022-08-25

## 2022-05-16 RX ORDER — NYSTATIN 100000 [USP'U]/G
POWDER TOPICAL 2 TIMES DAILY
Qty: 60 G | Refills: 11 | Status: SHIPPED | OUTPATIENT
Start: 2022-05-16 | End: 2022-11-29 | Stop reason: SDUPTHER

## 2022-05-16 RX ORDER — ACETAMINOPHEN 500 MG
5000 TABLET ORAL DAILY
Qty: 90 TABLET | Refills: 3 | Status: SHIPPED | OUTPATIENT
Start: 2022-05-16 | End: 2022-08-25

## 2022-05-16 RX ORDER — TRAMADOL HYDROCHLORIDE 50 MG/1
50 TABLET ORAL DAILY PRN
Qty: 30 EACH | Refills: 0 | Status: SHIPPED | OUTPATIENT
Start: 2022-05-16 | End: 2022-08-25 | Stop reason: SDUPTHER

## 2022-05-16 NOTE — PROGRESS NOTES
"Subjective:      Patient ID: Corrine Gan is a 37 y.o. female.    Chief Complaint: Follow-up      Vitals:    05/16/22 1146   BP: 114/70   Pulse: 82   Temp: 98 °F (36.7 °C)   TempSrc: Oral   SpO2: 98%   Weight: 85.2 kg (187 lb 11.6 oz)   Height: 5' 5" (1.651 m)        HPI   Follow up; new job; odor skin in folds  Wants to try nystatin  Had albs  Saw gyn  vyvanse helps  Heavy periods      Problem List  Patient Active Problem List   Diagnosis    Encounter for smoking cessation counseling    Attention deficit hyperactivity disorder (ADHD), combined type    Anemia    Yeast dermatitis        ALLERGIES: Review of patient's allergies indicates:  No Known Allergies    MEDS:   Current Outpatient Medications:     HYDROcodone-acetaminophen (NORCO) 5-325 mg per tablet, Take 1 tablet by mouth., Disp: , Rfl:     omeprazole (PRILOSEC) 20 MG capsule, Take 1 capsule (20 mg total) by mouth once daily., Disp: 90 capsule, Rfl: 3    cholecalciferol, vitamin D3, (VITAMIN D3) 125 mcg (5,000 unit) Tab, Take 1 tablet (5,000 Units total) by mouth once daily., Disp: 90 tablet, Rfl: 3    lisdexamfetamine (VYVANSE) 20 MG capsule, Take 1 capsule (20 mg total) by mouth every morning. For ADD, Disp: 90 capsule, Rfl: 0    nystatin (MYCOSTATIN) powder, Apply topically 2 (two) times daily., Disp: 60 g, Rfl: 11      History:  Current Providers as of 5/16/2022  PCP: April Chacon DO  Care Team Provider: April Chacon DO  Care Team Provider: Sandee Montero LPN  Encounter Provider: Reinier Madera MD, starting on Mon May 16, 2022 12:00 AM  Referring Provider: not found, starting on Mon May 16, 2022 12:00 AM  Consulting Physician: Reinier Madera MD, starting on Mon May 16, 2022 11:42 AM (Active)   Past Medical History:   Diagnosis Date    Coronary artery disease     large heart noted on CXR 2014, No problems    Diabetes mellitus     previous and current pregnancy, diet controlled. Gestational DM only    Gestational diabetes  "    Hypertension     preEclampsia    Pregnancy     x 2; preecalmpsia for second one     Past Surgical History:   Procedure Laterality Date     SECTION      gastric sleeve      TONSILLECTOMY       Social History     Tobacco Use    Smoking status: Former Smoker     Packs/day: 0.50     Types: Cigarettes    Smokeless tobacco: Never Used   Substance Use Topics    Alcohol use: No    Drug use: No         Review of Systems   Constitutional: Negative.    HENT: Negative.    Respiratory: Negative.    Cardiovascular: Negative.    Gastrointestinal: Negative.    Endocrine: Negative.    Genitourinary: Negative.    Musculoskeletal: Negative.    Skin: Positive for rash.   Psychiatric/Behavioral: Negative.    All other systems reviewed and are negative.    Objective:     Physical Exam  Vitals and nursing note reviewed.   Constitutional:       Appearance: She is well-developed.   HENT:      Head: Normocephalic.   Eyes:      Conjunctiva/sclera: Conjunctivae normal.      Pupils: Pupils are equal, round, and reactive to light.   Cardiovascular:      Rate and Rhythm: Normal rate and regular rhythm.      Heart sounds: Normal heart sounds.   Pulmonary:      Effort: Pulmonary effort is normal.      Breath sounds: Normal breath sounds.   Musculoskeletal:         General: Normal range of motion.      Cervical back: Normal range of motion and neck supple.   Skin:     General: Skin is warm and dry.   Neurological:      Mental Status: She is alert and oriented to person, place, and time.      Deep Tendon Reflexes: Reflexes are normal and symmetric.   Psychiatric:         Behavior: Behavior normal.         Thought Content: Thought content normal.         Judgment: Judgment normal.             Assessment:     1. Yeast dermatitis    2. Anemia, unspecified type    3. Attention deficit hyperactivity disorder (ADHD), combined type      Plan:        Medication List          Accurate as of May 16, 2022 12:32 PM. If you have any questions,  ask your nurse or doctor.            START taking these medications    cholecalciferol (vitamin D3) 125 mcg (5,000 unit) Tab  Commonly known as: VITAMIN D3  Take 1 tablet (5,000 Units total) by mouth once daily.  Started by: Reinier Madera MD     nystatin powder  Commonly known as: MYCOSTATIN  Apply topically 2 (two) times daily.  Started by: Reinier Madera MD        CONTINUE taking these medications    HYDROcodone-acetaminophen 5-325 mg per tablet  Commonly known as: NORCO     lisdexamfetamine 20 MG capsule  Commonly known as: VYVANSE  Take 1 capsule (20 mg total) by mouth every morning. For ADD     omeprazole 20 MG capsule  Commonly known as: PRILOSEC  Take 1 capsule (20 mg total) by mouth once daily.           Where to Get Your Medications      These medications were sent to [id number] - [place] - [address] AT [facility] [place] [place]  [address] [address]    Phone: [phone]   · cholecalciferol (vitamin D3) 125 mcg (5,000 unit) Tab  · lisdexamfetamine 20 MG capsule  · nystatin powder       Yeast dermatitis    Anemia, unspecified type  -     Ferritin; Future; Expected date: 05/16/2022  -     Iron; Future; Expected date: 05/16/2022  -     Vitamin B12; Future; Expected date: 05/16/2022    Attention deficit hyperactivity disorder (ADHD), combined type    Other orders  -     nystatin (MYCOSTATIN) powder; Apply topically 2 (two) times daily.  Dispense: 60 g; Refill: 11  -     cholecalciferol, vitamin D3, (VITAMIN D3) 125 mcg (5,000 unit) Tab; Take 1 tablet (5,000 Units total) by mouth once daily.  Dispense: 90 tablet; Refill: 3  -     lisdexamfetamine (VYVANSE) 20 MG capsule; Take 1 capsule (20 mg total) by mouth every morning. For ADD  Dispense: 90 capsule; Refill: 0        Tramadol for foot pain prn

## 2022-05-17 LAB
IRON SERPL-MCNC: 99 UG/DL (ref 30–160)
VIT B12 SERPL-MCNC: 1222 PG/ML (ref 210–950)

## 2022-05-18 LAB — HCV AB SERPL QL IA: NEGATIVE

## 2022-05-19 ENCOUNTER — PATIENT MESSAGE (OUTPATIENT)
Dept: FAMILY MEDICINE | Facility: CLINIC | Age: 37
End: 2022-05-19
Payer: COMMERCIAL

## 2022-05-19 NOTE — TELEPHONE ENCOUNTER
PA for Ozempic has been submitted.    Corrine Hicks (Lai: [id number])    [employer] is reviewing your PA request and will respond within 24 hours for Medicaid or up to 72 hours for non-Medicaid plans, based on the required timeframe determined by state or federal regulations. To check for an update later, open this request from your dashboard.

## 2022-05-27 ENCOUNTER — TELEPHONE (OUTPATIENT)
Dept: FAMILY MEDICINE | Facility: CLINIC | Age: 37
End: 2022-05-27
Payer: COMMERCIAL

## 2022-05-27 NOTE — TELEPHONE ENCOUNTER
----- Message from Reinier Madera MD sent at 5/23/2022  8:06 AM CDT -----  Too much B12  Stored iron, ferritin is low, but free circulating iron is normal  Take OTC iron sulfate 325 daily  Repeat CBC, ferritin 3 months

## 2022-06-01 ENCOUNTER — PATIENT MESSAGE (OUTPATIENT)
Dept: FAMILY MEDICINE | Facility: CLINIC | Age: 37
End: 2022-06-01
Payer: COMMERCIAL

## 2022-06-03 ENCOUNTER — PATIENT MESSAGE (OUTPATIENT)
Dept: FAMILY MEDICINE | Facility: CLINIC | Age: 37
End: 2022-06-03
Payer: COMMERCIAL

## 2022-06-07 ENCOUNTER — PATIENT MESSAGE (OUTPATIENT)
Dept: FAMILY MEDICINE | Facility: CLINIC | Age: 37
End: 2022-06-07
Payer: COMMERCIAL

## 2022-06-10 ENCOUNTER — PATIENT MESSAGE (OUTPATIENT)
Dept: FAMILY MEDICINE | Facility: CLINIC | Age: 37
End: 2022-06-10
Payer: COMMERCIAL

## 2022-06-10 NOTE — TELEPHONE ENCOUNTER
PA for Wegovy has been sent via [employer].     CESAR SCHUMACHER (Key: [id number])  Wegovy 0.25MG/0.5ML auto-injectors     Form  [employer] Electronic PA Form (2017 NCPDP)  Created  7 minutes ago  Sent to Plan  1 minute ago  Plan Response  1 minute ago  Submit Clinical Questions  less than a minute ago  Determination  Favorable  less than a minute ago  Your prior authorization for Wegovy has been approved!  MORE INFO  Personalized support and financial assistance may be available through the 's [employer] program. For more information, and to see program requirements, click on the More Info button to the right.    Message from plan: CaseId:[id number];Status:Approved;Review Type:Prior Auth;Coverage Start Date:05/11/2022;Coverage End Date:01/06/2023;

## 2022-07-19 ENCOUNTER — PATIENT MESSAGE (OUTPATIENT)
Dept: FAMILY MEDICINE | Facility: CLINIC | Age: 37
End: 2022-07-19
Payer: COMMERCIAL

## 2022-07-20 ENCOUNTER — PATIENT MESSAGE (OUTPATIENT)
Dept: FAMILY MEDICINE | Facility: CLINIC | Age: 37
End: 2022-07-20
Payer: COMMERCIAL

## 2022-07-20 RX ORDER — PHENTERMINE HYDROCHLORIDE 37.5 MG/1
37.5 TABLET ORAL
Qty: 30 TABLET | Refills: 0 | Status: SHIPPED | OUTPATIENT
Start: 2022-07-20 | End: 2022-08-19

## 2022-08-25 ENCOUNTER — OFFICE VISIT (OUTPATIENT)
Dept: FAMILY MEDICINE | Facility: CLINIC | Age: 37
End: 2022-08-25
Payer: COMMERCIAL

## 2022-08-25 ENCOUNTER — TELEPHONE (OUTPATIENT)
Dept: FAMILY MEDICINE | Facility: CLINIC | Age: 37
End: 2022-08-25

## 2022-08-25 VITALS
SYSTOLIC BLOOD PRESSURE: 108 MMHG | TEMPERATURE: 98 F | BODY MASS INDEX: 31.79 KG/M2 | WEIGHT: 190.81 LBS | HEIGHT: 65 IN | HEART RATE: 78 BPM | DIASTOLIC BLOOD PRESSURE: 60 MMHG | OXYGEN SATURATION: 99 %

## 2022-08-25 DIAGNOSIS — D64.9 ANEMIA, UNSPECIFIED TYPE: ICD-10-CM

## 2022-08-25 DIAGNOSIS — E66.09 OBESITY DUE TO EXCESS CALORIES, UNSPECIFIED CLASSIFICATION, UNSPECIFIED WHETHER SERIOUS COMORBIDITY PRESENT: ICD-10-CM

## 2022-08-25 DIAGNOSIS — F90.2 ATTENTION DEFICIT HYPERACTIVITY DISORDER (ADHD), COMBINED TYPE: Primary | ICD-10-CM

## 2022-08-25 PROCEDURE — 1159F PR MEDICATION LIST DOCUMENTED IN MEDICAL RECORD: ICD-10-PCS | Mod: CPTII,S$GLB,, | Performed by: FAMILY MEDICINE

## 2022-08-25 PROCEDURE — 3008F PR BODY MASS INDEX (BMI) DOCUMENTED: ICD-10-PCS | Mod: CPTII,S$GLB,, | Performed by: FAMILY MEDICINE

## 2022-08-25 PROCEDURE — 3074F PR MOST RECENT SYSTOLIC BLOOD PRESSURE < 130 MM HG: ICD-10-PCS | Mod: CPTII,S$GLB,, | Performed by: FAMILY MEDICINE

## 2022-08-25 PROCEDURE — 99214 PR OFFICE/OUTPT VISIT, EST, LEVL IV, 30-39 MIN: ICD-10-PCS | Mod: S$GLB,,, | Performed by: FAMILY MEDICINE

## 2022-08-25 PROCEDURE — 3078F PR MOST RECENT DIASTOLIC BLOOD PRESSURE < 80 MM HG: ICD-10-PCS | Mod: CPTII,S$GLB,, | Performed by: FAMILY MEDICINE

## 2022-08-25 PROCEDURE — 3074F SYST BP LT 130 MM HG: CPT | Mod: CPTII,S$GLB,, | Performed by: FAMILY MEDICINE

## 2022-08-25 PROCEDURE — 3044F HG A1C LEVEL LT 7.0%: CPT | Mod: CPTII,S$GLB,, | Performed by: FAMILY MEDICINE

## 2022-08-25 PROCEDURE — 3078F DIAST BP <80 MM HG: CPT | Mod: CPTII,S$GLB,, | Performed by: FAMILY MEDICINE

## 2022-08-25 PROCEDURE — 3044F PR MOST RECENT HEMOGLOBIN A1C LEVEL <7.0%: ICD-10-PCS | Mod: CPTII,S$GLB,, | Performed by: FAMILY MEDICINE

## 2022-08-25 PROCEDURE — 3008F BODY MASS INDEX DOCD: CPT | Mod: CPTII,S$GLB,, | Performed by: FAMILY MEDICINE

## 2022-08-25 PROCEDURE — 1159F MED LIST DOCD IN RCRD: CPT | Mod: CPTII,S$GLB,, | Performed by: FAMILY MEDICINE

## 2022-08-25 PROCEDURE — 99214 OFFICE O/P EST MOD 30 MIN: CPT | Mod: S$GLB,,, | Performed by: FAMILY MEDICINE

## 2022-08-25 RX ORDER — TRAMADOL HYDROCHLORIDE 50 MG/1
50 TABLET ORAL DAILY PRN
Qty: 30 EACH | Refills: 0 | Status: SHIPPED | OUTPATIENT
Start: 2022-08-25 | End: 2022-11-29 | Stop reason: SDUPTHER

## 2022-08-25 RX ORDER — LISDEXAMFETAMINE DIMESYLATE 40 MG/1
40 CAPSULE ORAL EVERY MORNING
Qty: 90 CAPSULE | Refills: 0 | Status: SHIPPED | OUTPATIENT
Start: 2022-08-25 | End: 2022-11-29 | Stop reason: SDUPTHER

## 2022-08-25 NOTE — PROGRESS NOTES
"Subjective:      Patient ID: Corrine Gan is a 37 y.o. female.    Chief Complaint: Follow-up and Medication Problem (Would like to increase vyvanse)      Vitals:    08/25/22 1104   BP: 108/60   Pulse: 78   Temp: 98.2 °F (36.8 °C)   TempSrc: Oral   SpO2: 99%   Weight: 86.5 kg (190 lb 12.9 oz)   Height: 5' 5" (1.651 m)        HPI   Here for ADD and weight  On vyvanse 20 daily, not helping  Initially worked, will increae to 40 mg daily    At [facility] now    Problem List  Patient Active Problem List   Diagnosis    Obesity due to excess calories    Encounter for smoking cessation counseling    Attention deficit hyperactivity disorder (ADHD), combined type    Anemia    Yeast dermatitis        ALLERGIES: Review of patient's allergies indicates:  No Known Allergies    MEDS:   Current Outpatient Medications:     nystatin (MYCOSTATIN) powder, Apply topically 2 (two) times daily., Disp: 60 g, Rfl: 11    omeprazole (PRILOSEC) 20 MG capsule, Take 1 capsule (20 mg total) by mouth once daily., Disp: 90 capsule, Rfl: 3    lisdexamfetamine (VYVANSE) 40 MG Cap, Take 1 capsule (40 mg total) by mouth every morning. For ADD, Disp: 90 capsule, Rfl: 0    traMADoL (ULTRAM) 50 mg tablet, Take 1 tablet (50 mg total) by mouth daily as needed for Pain. For foot pain, Disp: 30 each, Rfl: 0      History:  Current Providers as of 8/25/2022  PCP: April Chacon DO  Care Team Provider: April Chacon DO  Care Team Provider: Sandee Montero LPN  Care Team Provider: Michael A. Wiedemann, MD  Encounter Provider: Reinier Madera MD, starting on Thu Aug 25, 2022 12:00 AM  Referring Provider: not found, starting on Thu Aug 25, 2022 12:00 AM  Consulting Physician: Reinier Madera MD, starting on Thu Aug 25, 2022 11:02 AM (Active)   Past Medical History:   Diagnosis Date    Coronary artery disease     large heart noted on CXR 2014, No problems    Diabetes mellitus     previous and current pregnancy, diet controlled. " Gestational DM only    Gestational diabetes     Hypertension     preEclampsia    Pregnancy     x 2; preecalmpsia for second one     Past Surgical History:   Procedure Laterality Date     SECTION      gastric sleeve      TONSILLECTOMY       Social History     Tobacco Use    Smoking status: Current Every Day Smoker     Packs/day: 0.50     Types: Cigarettes    Smokeless tobacco: Never Used   Substance Use Topics    Alcohol use: No    Drug use: No         Review of Systems   Constitutional: Positive for unexpected weight change.   Psychiatric/Behavioral: Positive for decreased concentration.   All other systems reviewed and are negative.    Objective:     Physical Exam  Vitals and nursing note reviewed.   Constitutional:       Appearance: She is well-developed.   HENT:      Head: Normocephalic.   Eyes:      Conjunctiva/sclera: Conjunctivae normal.      Pupils: Pupils are equal, round, and reactive to light.   Cardiovascular:      Rate and Rhythm: Normal rate and regular rhythm.      Heart sounds: Normal heart sounds.   Pulmonary:      Effort: Pulmonary effort is normal.      Breath sounds: Normal breath sounds.   Musculoskeletal:         General: Normal range of motion.      Cervical back: Normal range of motion and neck supple.   Skin:     General: Skin is warm and dry.   Neurological:      Mental Status: She is alert and oriented to person, place, and time.      Deep Tendon Reflexes: Reflexes are normal and symmetric.   Psychiatric:         Behavior: Behavior normal.         Thought Content: Thought content normal.         Judgment: Judgment normal.             Assessment:     1. Attention deficit hyperactivity disorder (ADHD), combined type    2. Obesity due to excess calories, unspecified classification, unspecified whether serious comorbidity present    3. Anemia, unspecified type      Plan:        Medication List          Accurate as of 2022 12:07 PM. If you have any questions, ask  your nurse or doctor.            CHANGE how you take these medications    lisdexamfetamine 40 MG Cap  Commonly known as: VYVANSE  Take 1 capsule (40 mg total) by mouth every morning. For ADD  What changed:   · medication strength  · how much to take  Changed by: Reinier Madera MD        CONTINUE taking these medications    nystatin powder  Commonly known as: MYCOSTATIN  Apply topically 2 (two) times daily.     omeprazole 20 MG capsule  Commonly known as: PRILOSEC  Take 1 capsule (20 mg total) by mouth once daily.     traMADoL 50 mg tablet  Commonly known as: ULTRAM  Take 1 tablet (50 mg total) by mouth daily as needed for Pain. For foot pain           Where to Get Your Medications      These medications were sent to [id number] - [place] - [address] AT [facility] [place] [address] [address]    Phone: [phone]   · lisdexamfetamine 40 MG Cap  · traMADoL 50 mg tablet       Attention deficit hyperactivity disorder (ADHD), combined type    Obesity due to excess calories, unspecified classification, unspecified whether serious comorbidity present    Anemia, unspecified type  -     CBC Auto Differential; Future; Expected date: 08/25/2022  -     Ferritin; Future; Expected date: 08/25/2022    Other orders  -     traMADoL (ULTRAM) 50 mg tablet; Take 1 tablet (50 mg total) by mouth daily as needed for Pain. For foot pain  Dispense: 30 each; Refill: 0  -     lisdexamfetamine (VYVANSE) 40 MG Cap; Take 1 capsule (40 mg total) by mouth every morning. For ADD  Dispense: 90 capsule; Refill: 0        Take iron, vit Drepeat cbc and ferritin in 3 months  Pt will get back with her gyn re heavy menses, poss ablation, has 2 children, has enough

## 2022-11-03 ENCOUNTER — TELEPHONE (OUTPATIENT)
Dept: EMERGENCY MEDICINE | Facility: HOSPITAL | Age: 37
End: 2022-11-03
Payer: COMMERCIAL

## 2022-11-03 RX ORDER — CLINDAMYCIN HYDROCHLORIDE 300 MG/1
300 CAPSULE ORAL 4 TIMES DAILY
Qty: 28 CAPSULE | Refills: 0 | Status: SHIPPED | OUTPATIENT
Start: 2022-11-03 | End: 2022-11-10

## 2022-11-29 ENCOUNTER — TELEPHONE (OUTPATIENT)
Dept: FAMILY MEDICINE | Facility: CLINIC | Age: 37
End: 2022-11-29
Payer: COMMERCIAL

## 2022-11-29 RX ORDER — NYSTATIN 100000 [USP'U]/G
POWDER TOPICAL 2 TIMES DAILY
Qty: 60 G | Refills: 11 | Status: SHIPPED | OUTPATIENT
Start: 2022-11-29 | End: 2023-08-28 | Stop reason: SDUPTHER

## 2022-11-29 RX ORDER — OMEPRAZOLE 20 MG/1
20 CAPSULE, DELAYED RELEASE ORAL DAILY
Qty: 90 CAPSULE | Refills: 3 | Status: SHIPPED | OUTPATIENT
Start: 2022-11-29 | End: 2023-08-28 | Stop reason: SDUPTHER

## 2022-11-29 RX ORDER — LISDEXAMFETAMINE DIMESYLATE 40 MG/1
40 CAPSULE ORAL EVERY MORNING
Qty: 90 CAPSULE | Refills: 0 | Status: SHIPPED | OUTPATIENT
Start: 2022-11-29 | End: 2023-02-27

## 2022-11-29 RX ORDER — TRAMADOL HYDROCHLORIDE 50 MG/1
50 TABLET ORAL DAILY PRN
Qty: 30 EACH | Refills: 0 | Status: SHIPPED | OUTPATIENT
Start: 2022-11-29 | End: 2023-08-28 | Stop reason: SDUPTHER

## 2022-11-29 NOTE — TELEPHONE ENCOUNTER
Care Due:                  Date            Visit Type   Department     Provider  --------------------------------------------------------------------------------    Last Visit: None Found      None         None Found  Next Visit: None Scheduled  None         None Found                                                            Last  Test          Frequency    Reason                     Performed    Due Date  --------------------------------------------------------------------------------    Office Visit  12 months..  omeprazole...............  Not Found    Overdue    [employer] Embedded Care Gaps. Reference number: [id number]. 11/29/2022   7:36:48 AM CST

## 2022-11-29 NOTE — TELEPHONE ENCOUNTER
----- Message from Cyndi Parson sent at 11/29/2022  8:10 AM CST -----  Regarding: call back  Contact: [phone]  Who Called: PT     Type:  Sooner Apoointment Request    Caller is requesting a sooner appointment.  Caller declined first available appointment listed below.  Caller will not accept being placed on the waitlist and is requesting a message be sent to doctor.  Name of Caller: PT   When is the first available appointment? March   Symptoms: Meds refill   Would the patient rather a call back or a response via [facility]? Call back   Best Call Back Number: [phone]  Additional Information: please call after 3:30 PM

## 2022-12-21 ENCOUNTER — PATIENT MESSAGE (OUTPATIENT)
Dept: FAMILY MEDICINE | Facility: CLINIC | Age: 37
End: 2022-12-21
Payer: COMMERCIAL

## 2022-12-21 RX ORDER — TIRZEPATIDE 2.5 MG/.5ML
2.5 INJECTION, SOLUTION SUBCUTANEOUS
Qty: 4 PEN | Refills: 11 | Status: SHIPPED | OUTPATIENT
Start: 2022-12-21 | End: 2023-01-02

## 2022-12-26 ENCOUNTER — PATIENT MESSAGE (OUTPATIENT)
Dept: FAMILY MEDICINE | Facility: CLINIC | Age: 37
End: 2022-12-26
Payer: COMMERCIAL

## 2022-12-27 ENCOUNTER — PATIENT MESSAGE (OUTPATIENT)
Dept: FAMILY MEDICINE | Facility: CLINIC | Age: 37
End: 2022-12-27
Payer: COMMERCIAL

## 2022-12-28 ENCOUNTER — PATIENT MESSAGE (OUTPATIENT)
Dept: FAMILY MEDICINE | Facility: CLINIC | Age: 37
End: 2022-12-28
Payer: COMMERCIAL

## 2022-12-29 ENCOUNTER — TELEPHONE (OUTPATIENT)
Dept: FAMILY MEDICINE | Facility: CLINIC | Age: 37
End: 2022-12-29
Payer: COMMERCIAL

## 2022-12-29 NOTE — TELEPHONE ENCOUNTER
Drug  Mounjaro 2.5MG/0.5ML pen-injectors  Form  [employer] Electronic PA Form (2017 NCPDP)      Corrine Hicks (patient's maiden name, insurance is still listed under it)  Key: [id number] - PA Case ID: [id number]       Update:    PA was denied as her insurance will not cover the medication for weight loss.

## 2022-12-30 NOTE — TELEPHONE ENCOUNTER
Spoke to patient. Patient verbally understood th message below.     Patient asked for the Wegovy to be sent to the [facility] pharmacy as we have a PA approved for that medication.

## 2023-01-02 RX ORDER — SEMAGLUTIDE 0.25 MG/.5ML
0.25 INJECTION, SOLUTION SUBCUTANEOUS
Qty: 2 ML | Refills: 11 | Status: SHIPPED | OUTPATIENT
Start: 2023-01-02 | End: 2023-02-27

## 2023-01-03 NOTE — TELEPHONE ENCOUNTER
Attempted patient. LVM to letting her know that the Wegovy was sent to her pharmacy. (Detailed message was left)

## 2023-01-13 ENCOUNTER — TELEPHONE (OUTPATIENT)
Dept: FAMILY MEDICINE | Facility: CLINIC | Age: 38
End: 2023-01-13
Payer: COMMERCIAL

## 2023-01-13 NOTE — TELEPHONE ENCOUNTER
----- Message from Atiya Longoria sent at 1/13/2023 11:14 AM CST -----  Regarding: appt  Contact: self/ walked in  The pt is trying to get an appt to see Dr Madera in February.  She has to every 6 month because of medicine.  Please contact her at [phone]

## 2023-02-07 ENCOUNTER — PATIENT MESSAGE (OUTPATIENT)
Dept: FAMILY MEDICINE | Facility: CLINIC | Age: 38
End: 2023-02-07
Payer: COMMERCIAL

## 2023-02-07 NOTE — TELEPHONE ENCOUNTER
CaseId:[id number]   Status:Approved   Review Type:Prior Auth   Coverage Start Date:01/08/2023   Coverage End Date:09/05/2023     Wegovy 0.25MG/0.5ML auto-injectors     Form  [employer] Electronic PA Form (2017 NCPDP)

## 2023-02-27 ENCOUNTER — OFFICE VISIT (OUTPATIENT)
Dept: FAMILY MEDICINE | Facility: CLINIC | Age: 38
End: 2023-02-27
Payer: COMMERCIAL

## 2023-02-27 VITALS
BODY MASS INDEX: 30.5 KG/M2 | WEIGHT: 183.06 LBS | SYSTOLIC BLOOD PRESSURE: 110 MMHG | HEART RATE: 74 BPM | OXYGEN SATURATION: 98 % | HEIGHT: 65 IN | DIASTOLIC BLOOD PRESSURE: 64 MMHG | TEMPERATURE: 98 F

## 2023-02-27 DIAGNOSIS — F90.2 ATTENTION DEFICIT HYPERACTIVITY DISORDER (ADHD), COMBINED TYPE: Primary | ICD-10-CM

## 2023-02-27 DIAGNOSIS — K21.9 GASTROESOPHAGEAL REFLUX DISEASE, UNSPECIFIED WHETHER ESOPHAGITIS PRESENT: ICD-10-CM

## 2023-02-27 DIAGNOSIS — M54.32 BILATERAL SCIATICA: ICD-10-CM

## 2023-02-27 DIAGNOSIS — F17.200 SMOKES: ICD-10-CM

## 2023-02-27 DIAGNOSIS — M54.31 BILATERAL SCIATICA: ICD-10-CM

## 2023-02-27 PROCEDURE — 3074F SYST BP LT 130 MM HG: CPT | Mod: CPTII,S$GLB,, | Performed by: FAMILY MEDICINE

## 2023-02-27 PROCEDURE — 1159F MED LIST DOCD IN RCRD: CPT | Mod: CPTII,S$GLB,, | Performed by: FAMILY MEDICINE

## 2023-02-27 PROCEDURE — 3008F BODY MASS INDEX DOCD: CPT | Mod: CPTII,S$GLB,, | Performed by: FAMILY MEDICINE

## 2023-02-27 PROCEDURE — 3078F PR MOST RECENT DIASTOLIC BLOOD PRESSURE < 80 MM HG: ICD-10-PCS | Mod: CPTII,S$GLB,, | Performed by: FAMILY MEDICINE

## 2023-02-27 PROCEDURE — 3074F PR MOST RECENT SYSTOLIC BLOOD PRESSURE < 130 MM HG: ICD-10-PCS | Mod: CPTII,S$GLB,, | Performed by: FAMILY MEDICINE

## 2023-02-27 PROCEDURE — 3078F DIAST BP <80 MM HG: CPT | Mod: CPTII,S$GLB,, | Performed by: FAMILY MEDICINE

## 2023-02-27 PROCEDURE — 1159F PR MEDICATION LIST DOCUMENTED IN MEDICAL RECORD: ICD-10-PCS | Mod: CPTII,S$GLB,, | Performed by: FAMILY MEDICINE

## 2023-02-27 PROCEDURE — 99214 PR OFFICE/OUTPT VISIT, EST, LEVL IV, 30-39 MIN: ICD-10-PCS | Mod: S$GLB,,, | Performed by: FAMILY MEDICINE

## 2023-02-27 PROCEDURE — 99214 OFFICE O/P EST MOD 30 MIN: CPT | Mod: S$GLB,,, | Performed by: FAMILY MEDICINE

## 2023-02-27 PROCEDURE — 3008F PR BODY MASS INDEX (BMI) DOCUMENTED: ICD-10-PCS | Mod: CPTII,S$GLB,, | Performed by: FAMILY MEDICINE

## 2023-02-27 RX ORDER — IBUPROFEN 800 MG/1
800 TABLET ORAL 3 TIMES DAILY
COMMUNITY
Start: 2023-02-16 | End: 2023-02-27

## 2023-02-27 RX ORDER — METHOCARBAMOL 750 MG/1
750 TABLET, FILM COATED ORAL
COMMUNITY
Start: 2023-02-16

## 2023-02-27 RX ORDER — SEMAGLUTIDE 0.5 MG/.5ML
0.5 INJECTION, SOLUTION SUBCUTANEOUS
Qty: 2 ML | Refills: 11 | Status: SHIPPED | OUTPATIENT
Start: 2023-02-27

## 2023-02-27 RX ORDER — KETOROLAC TROMETHAMINE 10 MG/1
10 TABLET, FILM COATED ORAL EVERY 6 HOURS
Qty: 20 TABLET | Refills: 0 | Status: SHIPPED | OUTPATIENT
Start: 2023-02-27 | End: 2023-03-04

## 2023-02-27 RX ORDER — HYDROCODONE BITARTRATE AND ACETAMINOPHEN 5; 325 MG/1; MG/1
.5-1 TABLET ORAL 4 TIMES DAILY PRN
COMMUNITY
Start: 2023-02-16

## 2023-02-27 RX ORDER — LISDEXAMFETAMINE DIMESYLATE 40 MG/1
40 CAPSULE ORAL EVERY MORNING
Qty: 90 CAPSULE | Refills: 0 | Status: SHIPPED | OUTPATIENT
Start: 2023-02-27 | End: 2023-07-25 | Stop reason: SDUPTHER

## 2023-02-27 NOTE — PROGRESS NOTES
"Subjective:      Patient ID: Corrine Gan is a 37 y.o. female.    Chief Complaint: Follow-up      Vitals:    02/27/23 1030   BP: 110/64   Pulse: 74   Temp: 97.8 °F (36.6 °C)   TempSrc: Oral   SpO2: 98%   Weight: 83 kg (183 lb 1.5 oz)   Height: 5' 5" (1.651 m)        HPI   Here for ADD, also, sciatica tyhpe pain lower back, buttock, and groin  For about 2 years  Preivously had T spine pain and had MRI  Has lost weight in meantime  Went to urgent care, most recently fosciatica pain  Due labs soon  Normal creatinine  Will use toradol for wsciatica flareups  Not ibuprofen      Problem List  Patient Active Problem List   Diagnosis    Obesity due to excess calories    Encounter for smoking cessation counseling    Attention deficit hyperactivity disorder (ADHD), combined type    Anemia    Yeast dermatitis    Bilateral sciatica    Gastroesophageal reflux disease    Smokes        ALLERGIES: Review of patient's allergies indicates:  No Known Allergies    MEDS:   Current Outpatient Medications:     HYDROcodone-acetaminophen (NORCO) 5-325 mg per tablet, Take 0.5-1 tablets by mouth 4 (four) times daily as needed., Disp: , Rfl:     methocarbamoL (ROBAXIN) 750 MG Tab, Take 750 mg by mouth., Disp: , Rfl:     nystatin (MYCOSTATIN) powder, Apply topically 2 (two) times daily., Disp: 60 g, Rfl: 11    omeprazole (PRILOSEC) 20 MG capsule, Take 1 capsule (20 mg total) by mouth once daily., Disp: 90 capsule, Rfl: 3    traMADoL (ULTRAM) 50 mg tablet, Take 1 tablet (50 mg total) by mouth daily as needed for Pain. For foot pain, Disp: 30 each, Rfl: 0    ketorolac (TORADOL) 10 mg tablet, Take 1 tablet (10 mg total) by mouth every 6 (six) hours. for 5 days, Disp: 20 tablet, Rfl: 0    lisdexamfetamine (VYVANSE) 40 MG Cap, Take 1 capsule (40 mg total) by mouth every morning. For ADD, Disp: 90 capsule, Rfl: 0    semaglutide, weight loss, (WEGOVY) 0.5 mg/0.5 mL PnIj, Inject 0.5 mg into the skin every 7 days., Disp: 2 mL, Rfl: " 11      History:  Current Providers as of 2023  PCP: April Chacon DO  Care Team Provider: April Chacon DO  Care Team Provider: Sandee Montero LPN  Care Team Provider: Michael A. Wiedemann, MD  Encounter Provider: Reinier Madera MD, starting on  12:00 AM  Referring Provider: not found, starting on  12:00 AM  Consulting Physician: Reinier Madera MD, starting on  10:24 AM (Active)   Past Medical History:   Diagnosis Date    Coronary artery disease     large heart noted on CXR , No problems    Diabetes mellitus     previous and current pregnancy, diet controlled. Gestational DM only    Gestational diabetes     Hypertension     preEclampsia    Pregnancy     x 2; preecalmpsia for second one     Past Surgical History:   Procedure Laterality Date     SECTION      gastric sleeve      TONSILLECTOMY       Social History     Tobacco Use    Smoking status: Every Day     Packs/day: 0.50     Types: Cigarettes    Smokeless tobacco: Never   Substance Use Topics    Alcohol use: No    Drug use: No         Review of Systems   Constitutional: Negative.    HENT: Negative.     Respiratory: Negative.     Cardiovascular: Negative.    Gastrointestinal: Negative.    Endocrine: Negative.    Genitourinary: Negative.    Musculoskeletal: Negative.    Psychiatric/Behavioral: Negative.     All other systems reviewed and are negative.  Objective:     Physical Exam  Vitals and nursing note reviewed.   Constitutional:       Appearance: She is well-developed.   HENT:      Head: Normocephalic.   Eyes:      Conjunctiva/sclera: Conjunctivae normal.      Pupils: Pupils are equal, round, and reactive to light.   Cardiovascular:      Rate and Rhythm: Normal rate and regular rhythm.      Heart sounds: Normal heart sounds.   Pulmonary:      Effort: Pulmonary effort is normal.      Breath sounds: Normal breath sounds.   Musculoskeletal:         General: Normal range of motion.       Cervical back: Normal range of motion and neck supple.   Skin:     General: Skin is warm and dry.   Neurological:      Mental Status: She is alert and oriented to person, place, and time.      Deep Tendon Reflexes: Reflexes are normal and symmetric.   Psychiatric:         Behavior: Behavior normal.         Thought Content: Thought content normal.         Judgment: Judgment normal.           Assessment:     1. Attention deficit hyperactivity disorder (ADHD), combined type    2. Bilateral sciatica    3. Gastroesophageal reflux disease, unspecified whether esophagitis present    4. Smokes      Plan:        Medication List            Accurate as of February 27, 2023 11:38 AM. If you have any questions, ask your nurse or doctor.                START taking these medications      ketorolac 10 mg tablet  Commonly known as: TORADOL  Take 1 tablet (10 mg total) by mouth every 6 (six) hours. for 5 days  Started by: Reinier Madera MD     WEGOVY 0.5 mg/0.5 mL Pnij  Generic drug: semaglutide (weight loss)  Inject 0.5 mg into the skin every 7 days.  Replaces: WEGOVY 0.25 mg/0.5 mL Pnij  Started by: Reinier Madera MD            CONTINUE taking these medications      HYDROcodone-acetaminophen 5-325 mg per tablet  Commonly known as: NORCO     lisdexamfetamine 40 MG Cap  Commonly known as: VYVANSE  Take 1 capsule (40 mg total) by mouth every morning. For ADD     methocarbamoL 750 MG Tab  Commonly known as: ROBAXIN     nystatin powder  Commonly known as: MYCOSTATIN  Apply topically 2 (two) times daily.     omeprazole 20 MG capsule  Commonly known as: PRILOSEC  Take 1 capsule (20 mg total) by mouth once daily.     traMADoL 50 mg tablet  Commonly known as: ULTRAM  Take 1 tablet (50 mg total) by mouth daily as needed for Pain. For foot pain            STOP taking these medications      ibuprofen 800 MG tablet  Commonly known as: ADVIL,MOTRIN  Stopped by: Reinier Madera MD     WEGOVY 0.25 mg/0.5 mL Pnij  Generic drug: semaglutide  (weight loss)  Replaced by: WEGOVY 0.5 mg/0.5 mL Pnij  Stopped by: Reinier Madera MD               Where to Get Your Medications        These medications were sent to [id number] - [place] - [address] AT [facility] [address]  [address] [address]      Phone: [phone]   ketorolac 10 mg tablet  lisdexamfetamine 40 MG Cap  WEGOVY 0.5 mg/0.5 mL Pnij       Attention deficit hyperactivity disorder (ADHD), combined type    Bilateral sciatica    Gastroesophageal reflux disease, unspecified whether esophagitis present  -     Ambulatory referral/consult to Gastroenterology; Future; Expected date: 03/06/2023    Smokes    Other orders  -     ketorolac (TORADOL) 10 mg tablet; Take 1 tablet (10 mg total) by mouth every 6 (six) hours. for 5 days  Dispense: 20 tablet; Refill: 0  -     semaglutide, weight loss, (WEGOVY) 0.5 mg/0.5 mL PnIj; Inject 0.5 mg into the skin every 7 days.  Dispense: 2 mL; Refill: 11  -     lisdexamfetamine (VYVANSE) 40 MG Cap; Take 1 capsule (40 mg total) by mouth every morning. For ADD  Dispense: 90 capsule; Refill: 0

## 2023-03-24 ENCOUNTER — TELEPHONE (OUTPATIENT)
Dept: FAMILY MEDICINE | Facility: CLINIC | Age: 38
End: 2023-03-24
Payer: COMMERCIAL

## 2023-03-24 NOTE — TELEPHONE ENCOUNTER
PA submitted:    CESAR rodriguez (Key: [id number])  Rx #: [id number]  Wegovy 0.25MG/0.5ML auto-injectors    Form  [employer] Electronic PA Form (2017 NCPDP)

## 2023-03-31 ENCOUNTER — TELEPHONE (OUTPATIENT)
Dept: EMERGENCY MEDICINE | Facility: HOSPITAL | Age: 38
End: 2023-03-31
Payer: COMMERCIAL

## 2023-03-31 DIAGNOSIS — R35.0 URINARY FREQUENCY: Primary | ICD-10-CM

## 2023-03-31 RX ORDER — PHENAZOPYRIDINE HYDROCHLORIDE 200 MG/1
200 TABLET, FILM COATED ORAL 3 TIMES DAILY
Qty: 6 TABLET | Refills: 0 | Status: SHIPPED | OUTPATIENT
Start: 2023-03-31 | End: 2023-04-10

## 2023-03-31 RX ORDER — CEPHALEXIN 500 MG/1
500 CAPSULE ORAL 4 TIMES DAILY
Qty: 20 CAPSULE | Refills: 0 | Status: SHIPPED | OUTPATIENT
Start: 2023-03-31 | End: 2023-04-05

## 2023-04-03 ENCOUNTER — TELEPHONE (OUTPATIENT)
Dept: FAMILY MEDICINE | Facility: CLINIC | Age: 38
End: 2023-04-03
Payer: COMMERCIAL

## 2023-04-04 ENCOUNTER — HOSPITAL ENCOUNTER (EMERGENCY)
Facility: HOSPITAL | Age: 38
Discharge: HOME | End: 2023-04-04
Attending: EMERGENCY MEDICINE
Payer: COMMERCIAL

## 2023-04-04 VITALS
TEMPERATURE: 99 F | OXYGEN SATURATION: 98 % | HEART RATE: 75 BPM | SYSTOLIC BLOOD PRESSURE: 115 MMHG | RESPIRATION RATE: 18 BRPM | DIASTOLIC BLOOD PRESSURE: 55 MMHG | WEIGHT: 175 LBS | BODY MASS INDEX: 29.12 KG/M2

## 2023-04-04 DIAGNOSIS — N30.01 ACUTE CYSTITIS WITH HEMATURIA: Primary | ICD-10-CM

## 2023-04-04 LAB
ALBUMIN SERPL BCP-MCNC: 3.8 G/DL (ref 3.5–5.2)
ALP SERPL-CCNC: 74 U/L (ref 55–135)
ALT SERPL W/O P-5'-P-CCNC: 13 U/L (ref 10–44)
ANION GAP SERPL CALC-SCNC: 9 MMOL/L (ref 8–16)
AST SERPL-CCNC: 13 U/L (ref 10–40)
B-HCG UR QL: NEGATIVE
BACTERIA #/AREA URNS HPF: ABNORMAL /HPF
BASOPHILS # BLD AUTO: 0.04 K/UL (ref 0–0.2)
BASOPHILS NFR BLD: 0.5 % (ref 0–1.9)
BILIRUB SERPL-MCNC: 0.2 MG/DL (ref 0.1–1)
BILIRUB UR QL STRIP: ABNORMAL
BUN SERPL-MCNC: 19 MG/DL (ref 6–20)
CALCIUM SERPL-MCNC: 8.8 MG/DL (ref 8.7–10.5)
CHLORIDE SERPL-SCNC: 109 MMOL/L (ref 95–110)
CLARITY UR: ABNORMAL
CO2 SERPL-SCNC: 23 MMOL/L (ref 23–29)
COLOR UR: ABNORMAL
CREAT SERPL-MCNC: 0.9 MG/DL (ref 0.5–1.4)
CTP QC/QA: YES
DIFFERENTIAL METHOD: ABNORMAL
EOSINOPHIL # BLD AUTO: 0.1 K/UL (ref 0–0.5)
EOSINOPHIL NFR BLD: 1.3 % (ref 0–8)
ERYTHROCYTE [DISTWIDTH] IN BLOOD BY AUTOMATED COUNT: 17.6 % (ref 11.5–14.5)
EST. GFR  (NO RACE VARIABLE): >60 ML/MIN/1.73 M^2
GLUCOSE SERPL-MCNC: 77 MG/DL (ref 70–110)
GLUCOSE UR QL STRIP: NEGATIVE
HCT VFR BLD AUTO: 29.1 % (ref 37–48.5)
HGB BLD-MCNC: 8.8 G/DL (ref 12–16)
HGB UR QL STRIP: ABNORMAL
HYALINE CASTS #/AREA URNS LPF: 0 /LPF
IMM GRANULOCYTES # BLD AUTO: 0.03 K/UL (ref 0–0.04)
IMM GRANULOCYTES NFR BLD AUTO: 0.4 % (ref 0–0.5)
KETONES UR QL STRIP: NEGATIVE
LEUKOCYTE ESTERASE UR QL STRIP: ABNORMAL
LYMPHOCYTES # BLD AUTO: 1.7 K/UL (ref 1–4.8)
LYMPHOCYTES NFR BLD: 20.8 % (ref 18–48)
MCH RBC QN AUTO: 23.4 PG (ref 27–31)
MCHC RBC AUTO-ENTMCNC: 30.2 G/DL (ref 32–36)
MCV RBC AUTO: 77 FL (ref 82–98)
MICROSCOPIC COMMENT: ABNORMAL
MONOCYTES # BLD AUTO: 0.6 K/UL (ref 0.3–1)
MONOCYTES NFR BLD: 6.7 % (ref 4–15)
NEUTROPHILS # BLD AUTO: 5.8 K/UL (ref 1.8–7.7)
NEUTROPHILS NFR BLD: 70.3 % (ref 38–73)
NITRITE UR QL STRIP: POSITIVE
NON-SQ EPI CELLS #/AREA URNS HPF: 1 /HPF
NRBC BLD-RTO: 0 /100 WBC
PH UR STRIP: 6 [PH] (ref 5–8)
PLATELET # BLD AUTO: 479 K/UL (ref 150–450)
PMV BLD AUTO: 8.8 FL (ref 9.2–12.9)
POTASSIUM SERPL-SCNC: 3.6 MMOL/L (ref 3.5–5.1)
PROT SERPL-MCNC: 7.5 G/DL (ref 6–8.4)
PROT UR QL STRIP: ABNORMAL
RBC # BLD AUTO: 3.76 M/UL (ref 4–5.4)
RBC #/AREA URNS HPF: 22 /HPF (ref 0–4)
SODIUM SERPL-SCNC: 141 MMOL/L (ref 136–145)
SP GR UR STRIP: 1.02 (ref 1–1.03)
SQUAMOUS #/AREA URNS HPF: 13 /HPF
URN SPEC COLLECT METH UR: ABNORMAL
UROBILINOGEN UR STRIP-ACNC: ABNORMAL EU/DL
WBC # BLD AUTO: 8.24 K/UL (ref 3.9–12.7)
WBC #/AREA URNS HPF: >100 /HPF (ref 0–5)
WBC CLUMPS URNS QL MICRO: ABNORMAL

## 2023-04-04 PROCEDURE — 63600175 PHARM REV CODE 636 W HCPCS: Performed by: EMERGENCY MEDICINE

## 2023-04-04 PROCEDURE — 25000003 PHARM REV CODE 250: Performed by: EMERGENCY MEDICINE

## 2023-04-04 PROCEDURE — 87088 URINE BACTERIA CULTURE: CPT | Performed by: EMERGENCY MEDICINE

## 2023-04-04 PROCEDURE — 96365 THER/PROPH/DIAG IV INF INIT: CPT

## 2023-04-04 PROCEDURE — 87086 URINE CULTURE/COLONY COUNT: CPT | Performed by: EMERGENCY MEDICINE

## 2023-04-04 PROCEDURE — 96375 TX/PRO/DX INJ NEW DRUG ADDON: CPT

## 2023-04-04 PROCEDURE — 96361 HYDRATE IV INFUSION ADD-ON: CPT

## 2023-04-04 PROCEDURE — 80053 COMPREHEN METABOLIC PANEL: CPT | Performed by: EMERGENCY MEDICINE

## 2023-04-04 PROCEDURE — 87077 CULTURE AEROBIC IDENTIFY: CPT | Performed by: EMERGENCY MEDICINE

## 2023-04-04 PROCEDURE — 81000 URINALYSIS NONAUTO W/SCOPE: CPT | Performed by: EMERGENCY MEDICINE

## 2023-04-04 PROCEDURE — 99285 EMERGENCY DEPT VISIT HI MDM: CPT | Mod: 25

## 2023-04-04 PROCEDURE — 85025 COMPLETE CBC W/AUTO DIFF WBC: CPT | Performed by: EMERGENCY MEDICINE

## 2023-04-04 PROCEDURE — 81025 URINE PREGNANCY TEST: CPT | Performed by: EMERGENCY MEDICINE

## 2023-04-04 PROCEDURE — 87186 SC STD MICRODIL/AGAR DIL: CPT | Performed by: EMERGENCY MEDICINE

## 2023-04-04 RX ORDER — ONDANSETRON 2 MG/ML
4 INJECTION INTRAMUSCULAR; INTRAVENOUS
Status: COMPLETED | OUTPATIENT
Start: 2023-04-04 | End: 2023-04-04

## 2023-04-04 RX ORDER — CIPROFLOXACIN 500 MG/1
500 TABLET ORAL 2 TIMES DAILY
Qty: 14 TABLET | Refills: 0 | Status: SHIPPED | OUTPATIENT
Start: 2023-04-04 | End: 2023-04-11

## 2023-04-04 RX ORDER — KETOROLAC TROMETHAMINE 30 MG/ML
15 INJECTION, SOLUTION INTRAMUSCULAR; INTRAVENOUS
Status: COMPLETED | OUTPATIENT
Start: 2023-04-04 | End: 2023-04-04

## 2023-04-04 RX ORDER — ONDANSETRON 4 MG/1
4 TABLET, ORALLY DISINTEGRATING ORAL EVERY 6 HOURS PRN
Qty: 10 TABLET | Refills: 0 | Status: SHIPPED | OUTPATIENT
Start: 2023-04-04

## 2023-04-04 RX ORDER — CIPROFLOXACIN 500 MG/1
500 TABLET ORAL
Status: COMPLETED | OUTPATIENT
Start: 2023-04-04 | End: 2023-04-04

## 2023-04-04 RX ADMIN — CEFTRIAXONE 1 G: 1 INJECTION, POWDER, FOR SOLUTION INTRAMUSCULAR; INTRAVENOUS at 03:04

## 2023-04-04 RX ADMIN — KETOROLAC TROMETHAMINE 15 MG: 30 INJECTION, SOLUTION INTRAMUSCULAR; INTRAVENOUS at 02:04

## 2023-04-04 RX ADMIN — SODIUM CHLORIDE 1000 ML: 0.9 INJECTION, SOLUTION INTRAVENOUS at 02:04

## 2023-04-04 RX ADMIN — CIPROFLOXACIN 500 MG: 500 TABLET, FILM COATED ORAL at 04:04

## 2023-04-04 RX ADMIN — ONDANSETRON HYDROCHLORIDE 4 MG: 2 SOLUTION INTRAMUSCULAR; INTRAVENOUS at 02:04

## 2023-04-04 NOTE — ED NOTES
Pt presents to ED for NV, hematuria, flank pain x 2wks; states cannot hold down prescribed UTI antibx; mother has hx of kidney stones. Pt frequently drinks energy drinks.

## 2023-04-04 NOTE — ED PROVIDER NOTES
Encounter Date: 2023       History     Chief Complaint   Patient presents with    Flank Pain     Recent UTI symptoms, + blood in urine, unable to tolerate fluids, + nausea, unable to hold abx down      37 y/o F presents to the ER c/o N/V, B/L flank pain R>L, dysuria, frequency, urgency, hematuria. Symptoms began several days ago with dysuria and urgency/frequency. She received a prescription for keflex at that time. However she subsequently developed nausea with occasional emesis, particularly when taking the abx, and has not been able to tolerate them the last few days. States she's gradually been feeling worse. Emesis is nonbloody. Has been feeling hot and cold with general fatigue. Notes B/L low back/flank pain, R>L, that fluctuates in intensity without eliciting, exacerbating, or alleviating factors. Not positional. No other symptoms reported at this time.     Review of patient's allergies indicates:  No Known Allergies  Past Medical History:   Diagnosis Date    Coronary artery disease     large heart noted on CXR , No problems    Diabetes mellitus     previous and current pregnancy, diet controlled. Gestational DM only    Gestational diabetes     Hypertension     preEclampsia    Pregnancy     x 2; preecalmpsia for second one     Past Surgical History:   Procedure Laterality Date     SECTION      gastric sleeve      TONSILLECTOMY       Family History   Problem Relation Age of Onset    Diabetes Mother     Hypertension Father     Diabetes Maternal Grandmother      Social History     Tobacco Use    Smoking status: Every Day     Packs/day: 0.50     Types: Cigarettes    Smokeless tobacco: Never   Substance Use Topics    Alcohol use: No    Drug use: No     Review of Systems   Constitutional:  Positive for fatigue.   HENT:  Negative for congestion.    Respiratory:  Negative for cough.    Cardiovascular:  Negative for chest pain.   Musculoskeletal:  Positive for back pain.   Neurological:  Negative for  light-headedness and headaches.     Physical Exam     Initial Vitals [04/04/23 1325]   BP Pulse Resp Temp SpO2   (!) 141/85 82 18 98.3 °F (36.8 °C) 99 %      MAP       --         Physical Exam    Nursing note and vitals reviewed.  Constitutional: She appears well-developed and well-nourished. No distress.   HENT:   Head: Normocephalic and atraumatic.   Eyes: Conjunctivae and EOM are normal. Pupils are equal, round, and reactive to light.   Neck: Neck supple. No tracheal deviation present.   Normal range of motion.  Cardiovascular:  Normal rate and intact distal pulses.           Pulmonary/Chest: No respiratory distress.   Musculoskeletal:         General: No edema. Normal range of motion.      Cervical back: Normal range of motion and neck supple.     Neurological: She is alert and oriented to person, place, and time. She has normal strength. No cranial nerve deficit. GCS score is 15. GCS eye subscore is 4. GCS verbal subscore is 5. GCS motor subscore is 6.   Skin: Skin is warm and dry.       ED Course   Procedures  Labs Reviewed   CBC W/ AUTO DIFFERENTIAL - Abnormal; Notable for the following components:       Result Value    RBC 3.76 (*)     Hemoglobin 8.8 (*)     Hematocrit 29.1 (*)     MCV 77 (*)     MCH 23.4 (*)     MCHC 30.2 (*)     RDW 17.6 (*)     Platelets 479 (*)     MPV 8.8 (*)     All other components within normal limits   URINALYSIS, REFLEX TO URINE CULTURE - Abnormal; Notable for the following components:    Color, UA Brown (*)     Appearance, UA Hazy (*)     Protein, UA 2+ (*)     Bilirubin (UA) 2+ (*)     Occult Blood UA 1+ (*)     Nitrite, UA Positive (*)     Urobilinogen, UA 4.0-6.0 (*)     Leukocytes, UA 1+ (*)     All other components within normal limits    Narrative:     Specimen Source->Urine   URINALYSIS MICROSCOPIC - Abnormal; Notable for the following components:    RBC, UA 22 (*)     WBC, UA >100 (*)     WBC Clumps, UA Occasional (*)     Bacteria Moderate (*)     Non-Squam Epith 1 (*)      All other components within normal limits    Narrative:     Specimen Source->Urine   CULTURE, URINE   COMPREHENSIVE METABOLIC PANEL   POCT URINE PREGNANCY              X-Rays:   Independently Interpreted Readings:   Other Readings:  CT Abd Pelvis independently interpreted by me pending radiology review: No acute obstruction or perforation, no kidney stone   Imaging Results               CT Abdomen Pelvis  Without Contrast (Final result)  Result time 04/04/23 15:46:58      Final result by Scar Swartz MD (04/04/23 15:46:58)                   Impression:      1. No acute abnormality.  2. There are 2 small adjacent nodular lesions in the right inguinal region with minimal adjacent stranding.  This could represent mild adenopathy or possible small superficial hematoma if there has been trauma to the region.  Follow-up recommended.  3. Small hiatal hernia with postoperative changes of the stomach.  4.  This report was flagged in Epic as abnormal.      Electronically signed by: Scar Swartz  Date:    04/04/2023  Time:    15:46               Narrative:    EXAMINATION:  CT ABDOMEN PELVIS WITHOUT CONTRAST    CLINICAL HISTORY:  Flank pain, kidney stone suspected; , location not otherwise specified.    TECHNIQUE:  Low dose axial images, sagittal and coronal reformations were obtained from the lung bases to the pubic symphysis, Oral contrast was not administered.    COMPARISON:  None    FINDINGS:  Heart: Normal in size. No pericardial effusion.    Lung Bases: Well aerated, without consolidation or pleural fluid.    Small hiatal hernia.    Postoperative changes of the stomach.    Liver: Normal in size and attenuation, with no focal hepatic lesions.    Gallbladder: No calcified gallstones.    Bile Ducts: No evidence of dilated ducts.    Pancreas: No mass or peripancreatic fat stranding.    Spleen: Unremarkable.    Adrenals: Unremarkable.    Kidneys/ Ureters: No stone, mass, hydronephrosis or hydroureter  bilaterally.    Bladder: No evidence of wall thickening.    Reproductive organs: Uterus is midline.    GI Tract/Mesentery: No evidence of bowel obstruction or inflammation.    Peritoneal Space: No ascites. No free air.    Retroperitoneum: No significant adenopathy.    Small lobular lesion the right inguinal subcutaneous fat could represent a mildly enlarged lymph node or possible small hematoma.  This measures approximately 1.3 cm on axial 159.  Similar 1.2 cm adjacent lesion on axial 144.  Minimal adjacent stranding.  Follow-up is recommended.    Abdominal wall: Unremarkable.    Vasculature: No significant atherosclerosis or aneurysm.    Bones: No acute fracture.    The appendix is within normal limits.    The lack of IV contrast may diminish the sensitivity.                                      Medications   ciprofloxacin HCl tablet 500 mg (has no administration in time range)   sodium chloride 0.9% bolus 1,000 mL 1,000 mL (0 mLs Intravenous Stopped 4/4/23 1508)   ondansetron injection 4 mg (4 mg Intravenous Given 4/4/23 1406)   ketorolac injection 15 mg (15 mg Intravenous Given 4/4/23 1408)   cefTRIAXone (ROCEPHIN) 1 g in dextrose 5 % in water (D5W) 5 % 50 mL IVPB (MB+) (0 g Intravenous Stopped 4/4/23 1553)     Medical Decision Making:   History:   Old Medical Records: I decided to obtain old medical records.  Old Records Summarized: records from clinic visits.       <> Summary of Records: Reviewed most recent PCP visit documenting baseline medications and PMH  Initial Assessment:   37 y/o F presents to the ER c/o flank pain, dysuria, frequency, urgency   Differential Diagnosis:   Diverticulitis, cholecystitis, pancreatitis, appendicitis, obstruction, UTI, pyelonephritis, kidney stone, gastroenteritis   Independently Interpreted Test(s):   I have ordered and independently interpreted X-rays - see prior notes.  Clinical Tests:   Lab Tests: Reviewed       <> Summary of Lab: CBC without leukocytosis, mild anemia  similar to baseline; CMP with normal renal and liver functions tests, normal electrolytes; UA concerning for nitrite positive UTI   ED Management:  Patient given IVF, zofran, toradol, and a gram of rocephin. On reevaluation, she reports significant improvement in symptoms. VSS. Urine sent for culture. Given a dose of oral cipro here as well. Informed of results as well as plan to discharge with rx for cipro, instructions on home mgmt, f/u with PCP, strict return precautions given.                         Clinical Impression:   Final diagnoses:  [N30.01] Acute cystitis with hematuria (Primary)        ED Disposition Condition    Discharge Stable          ED Prescriptions       Medication Sig Dispense Start Date End Date Auth. Provider    ondansetron (ZOFRAN-ODT) 4 MG TbDL Take 1 tablet (4 mg total) by mouth every 6 (six) hours as needed (Nausea). 10 tablet 4/4/2023 -- Karson Melgar MD    ciprofloxacin HCl (CIPRO) 500 MG tablet Take 1 tablet (500 mg total) by mouth 2 (two) times daily. for 7 days 14 tablet 4/4/2023 4/11/2023 Karson Melgar MD          Follow-up Information       Follow up With Specialties Details Why Contact Info    April Chacon,  Internal Medicine Schedule an appointment as soon as possible for a visit   [address] [facility]  [place] [address]  [phone]               Karson Melgar MD  04/04/23 7807

## 2023-04-06 LAB — BACTERIA UR CULT: ABNORMAL

## 2023-05-05 ENCOUNTER — PATIENT MESSAGE (OUTPATIENT)
Dept: FAMILY MEDICINE | Facility: CLINIC | Age: 38
End: 2023-05-05
Payer: COMMERCIAL

## 2023-05-17 ENCOUNTER — OFFICE VISIT (OUTPATIENT)
Dept: FAMILY MEDICINE | Facility: CLINIC | Age: 38
End: 2023-05-17
Payer: COMMERCIAL

## 2023-05-17 DIAGNOSIS — E66.09 OBESITY DUE TO EXCESS CALORIES, UNSPECIFIED CLASSIFICATION, UNSPECIFIED WHETHER SERIOUS COMORBIDITY PRESENT: ICD-10-CM

## 2023-05-17 DIAGNOSIS — Z00.00 WELLNESS EXAMINATION: ICD-10-CM

## 2023-05-17 DIAGNOSIS — F17.200 SMOKES: ICD-10-CM

## 2023-05-17 DIAGNOSIS — K21.9 GASTROESOPHAGEAL REFLUX DISEASE, UNSPECIFIED WHETHER ESOPHAGITIS PRESENT: ICD-10-CM

## 2023-05-17 DIAGNOSIS — F32.A DEPRESSION, UNSPECIFIED DEPRESSION TYPE: Primary | ICD-10-CM

## 2023-05-17 DIAGNOSIS — D64.9 ANEMIA, UNSPECIFIED TYPE: ICD-10-CM

## 2023-05-17 DIAGNOSIS — F90.2 ATTENTION DEFICIT HYPERACTIVITY DISORDER (ADHD), COMBINED TYPE: ICD-10-CM

## 2023-05-17 PROCEDURE — 99214 OFFICE O/P EST MOD 30 MIN: CPT | Mod: 95,,, | Performed by: FAMILY MEDICINE

## 2023-05-17 PROCEDURE — 1160F PR REVIEW ALL MEDS BY PRESCRIBER/CLIN PHARMACIST DOCUMENTED: ICD-10-PCS | Mod: CPTII,95,, | Performed by: FAMILY MEDICINE

## 2023-05-17 PROCEDURE — 1159F PR MEDICATION LIST DOCUMENTED IN MEDICAL RECORD: ICD-10-PCS | Mod: CPTII,95,, | Performed by: FAMILY MEDICINE

## 2023-05-17 PROCEDURE — 99214 PR OFFICE/OUTPT VISIT, EST, LEVL IV, 30-39 MIN: ICD-10-PCS | Mod: 95,,, | Performed by: FAMILY MEDICINE

## 2023-05-17 PROCEDURE — 1159F MED LIST DOCD IN RCRD: CPT | Mod: CPTII,95,, | Performed by: FAMILY MEDICINE

## 2023-05-17 PROCEDURE — 1160F RVW MEDS BY RX/DR IN RCRD: CPT | Mod: CPTII,95,, | Performed by: FAMILY MEDICINE

## 2023-05-17 RX ORDER — SEMAGLUTIDE 1 MG/.5ML
1 INJECTION, SOLUTION SUBCUTANEOUS
Qty: 4 EACH | Refills: 11 | Status: SHIPPED | OUTPATIENT
Start: 2023-05-17 | End: 2023-07-31

## 2023-05-17 RX ORDER — CITALOPRAM 10 MG/1
10 TABLET ORAL DAILY
Qty: 90 TABLET | Refills: 3 | Status: SHIPPED | OUTPATIENT
Start: 2023-05-17 | End: 2024-05-16

## 2023-05-17 NOTE — PROGRESS NOTES
Subjective:      Patient ID: Corrine Gan is a 38 y.o. female.    Chief Complaint: No chief complaint on file.      There were no vitals filed for this visit.     HPI   The patient location is: home  The chief complaint leading to consultation is: depression    Visit type: audiovisual    Face to Face time with patient: 30   minutes of total time spent on the encounter, which includes face to face time and non-face to face time preparing to see the patient (eg, review of tests), Obtaining and/or reviewing separately obtained history, Documenting clinical information in the electronic or other health record, Independently interpreting results (not separately reported) and communicating results to the patient/family/caregiver, or Care coordination (not separately reported).         Each patient to whom he or she provides medical services by telemedicine is:  (1) informed of the relationship between the physician and patient and the respective role of any other health care provider with respect to management of the patient; and (2) notified that he or she may decline to receive medical services by telemedicine and may withdraw from such care at any time.    Notes:    Problem List  Patient Active Problem List   Diagnosis    Obesity due to excess calories    Encounter for smoking cessation counseling    Attention deficit hyperactivity disorder (ADHD), combined type    Anemia    Yeast dermatitis    Bilateral sciatica    Gastroesophageal reflux disease    Smokes    Depression        ALLERGIES: Review of patient's allergies indicates:  No Known Allergies    MEDS:   Current Outpatient Medications:     citalopram (CELEXA) 10 MG tablet, Take 1 tablet (10 mg total) by mouth once daily., Disp: 90 tablet, Rfl: 3    HYDROcodone-acetaminophen (NORCO) 5-325 mg per tablet, Take 0.5-1 tablets by mouth 4 (four) times daily as needed., Disp: , Rfl:     lisdexamfetamine (VYVANSE) 40 MG Cap, Take 1 capsule (40 mg total) by mouth every  morning. For ADD, Disp: 90 capsule, Rfl: 0    methocarbamoL (ROBAXIN) 750 MG Tab, Take 750 mg by mouth., Disp: , Rfl:     nystatin (MYCOSTATIN) powder, Apply topically 2 (two) times daily., Disp: 60 g, Rfl: 11    omeprazole (PRILOSEC) 20 MG capsule, Take 1 capsule (20 mg total) by mouth once daily., Disp: 90 capsule, Rfl: 3    ondansetron (ZOFRAN-ODT) 4 MG TbDL, Take 1 tablet (4 mg total) by mouth every 6 (six) hours as needed (Nausea)., Disp: 10 tablet, Rfl: 0    semaglutide, weight loss, (WEGOVY) 0.5 mg/0.5 mL PnIj, Inject 0.5 mg into the skin every 7 days., Disp: 2 mL, Rfl: 11    semaglutide, weight loss, (WEGOVY) 1 mg/0.5 mL PnIj, Inject 1 mg into the skin every 7 days., Disp: 4 each, Rfl: 11    traMADoL (ULTRAM) 50 mg tablet, Take 1 tablet (50 mg total) by mouth daily as needed for Pain. For foot pain, Disp: 30 each, Rfl: 0      History:  Current Providers as of 2023  PCP: April Chacon DO  Care Team Provider: April Chacon DO  Care Team Provider: Sandee Montero LPN  Care Team Provider: Michael A. Wiedemann, MD  Encounter Provider: Reinier Madera MD, starting on Wed May 17, 2023 12:00 AM  Referring Provider: not found, starting on Wed May 17, 2023 12:00 AM  Consulting Physician: Reinier Madera MD, starting on Mon May 15, 2023  3:46 PM (Active)   Past Medical History:   Diagnosis Date    Coronary artery disease     large heart noted on CXR , No problems    Diabetes mellitus     previous and current pregnancy, diet controlled. Gestational DM only    Gestational diabetes     Hypertension     preEclampsia    Pregnancy     x 2; preecalmpsia for second one     Past Surgical History:   Procedure Laterality Date     SECTION      gastric sleeve      TONSILLECTOMY       Social History     Tobacco Use    Smoking status: Every Day     Packs/day: 0.50     Types: Cigarettes    Smokeless tobacco: Never   Substance Use Topics    Alcohol use: No    Drug use: No         Review of Systems    Constitutional:  Positive for activity change. Negative for unexpected weight change.   HENT:  Negative for hearing loss, rhinorrhea and trouble swallowing.    Eyes:  Negative for discharge and visual disturbance.   Respiratory:  Negative for chest tightness and wheezing.    Cardiovascular:  Negative for chest pain and palpitations.   Gastrointestinal:  Negative for blood in stool, constipation, diarrhea and vomiting.   Endocrine: Negative for polydipsia and polyuria.   Genitourinary:  Positive for menstrual problem. Negative for difficulty urinating, dysuria and hematuria.   Musculoskeletal:  Negative for arthralgias, joint swelling and neck pain.   Neurological:  Positive for headaches. Negative for weakness.   Psychiatric/Behavioral:  Positive for dysphoric mood. Negative for confusion.    All other systems reviewed and are negative.  Objective:     Physical Exam  Constitutional:       General: She is not in acute distress.     Appearance: Normal appearance. She is not ill-appearing, toxic-appearing or diaphoretic.   Neurological:      Mental Status: She is alert.   Psychiatric:         Mood and Affect: Mood normal.         Behavior: Behavior normal.         Thought Content: Thought content normal.         Judgment: Judgment normal.           Assessment:     1. Depression, unspecified depression type    2. Anemia, unspecified type    3. Attention deficit hyperactivity disorder (ADHD), combined type    4. Smokes    5. Wellness examination    6. Obesity due to excess calories, unspecified classification, unspecified whether serious comorbidity present    7. Gastroesophageal reflux disease, unspecified whether esophagitis present      Plan:        Medication List            Accurate as of May 17, 2023  8:10 AM. If you have any questions, ask your nurse or doctor.                START taking these medications      citalopram 10 MG tablet  Commonly known as: CeleXA  Take 1 tablet (10 mg total) by mouth once  daily.  Started by: Reinier Madera MD            CHANGE how you take these medications      * WEGOVY 0.5 mg/0.5 mL Pnij  Generic drug: semaglutide (weight loss)  Inject 0.5 mg into the skin every 7 days.  What changed: Another medication with the same name was added. Make sure you understand how and when to take each.  Changed by: Reinier Madera MD     * WEGOVY 1 mg/0.5 mL Pnij  Generic drug: semaglutide (weight loss)  Inject 1 mg into the skin every 7 days.  What changed: You were already taking a medication with the same name, and this prescription was added. Make sure you understand how and when to take each.  Changed by: Reinier Madera MD           * This list has 2 medication(s) that are the same as other medications prescribed for you. Read the directions carefully, and ask your doctor or other care provider to review them with you.                CONTINUE taking these medications      HYDROcodone-acetaminophen 5-325 mg per tablet  Commonly known as: NORCO     lisdexamfetamine 40 MG Cap  Commonly known as: VYVANSE  Take 1 capsule (40 mg total) by mouth every morning. For ADD     methocarbamoL 750 MG Tab  Commonly known as: ROBAXIN     nystatin powder  Commonly known as: MYCOSTATIN  Apply topically 2 (two) times daily.     omeprazole 20 MG capsule  Commonly known as: PRILOSEC  Take 1 capsule (20 mg total) by mouth once daily.     ondansetron 4 MG Tbdl  Commonly known as: ZOFRAN-ODT  Take 1 tablet (4 mg total) by mouth every 6 (six) hours as needed (Nausea).     traMADoL 50 mg tablet  Commonly known as: ULTRAM  Take 1 tablet (50 mg total) by mouth daily as needed for Pain. For foot pain               Where to Get Your Medications        These medications were sent to [id number] - [place] - [address] AT [facility] [place] [address]      Phone: [phone]   citalopram 10 MG tablet  WEGOVY 1 mg/0.5 mL Pnij       Depression, unspecified depression  type    Anemia, unspecified type  -     CBC Auto Differential; Future; Expected date: 05/17/2023  -     Lipid Panel; Future  -     TSH; Future  -     Vitamin D; Future  -     Urinalysis; Future    Attention deficit hyperactivity disorder (ADHD), combined type  -     CBC Auto Differential; Future; Expected date: 05/17/2023  -     Lipid Panel; Future  -     TSH; Future  -     Vitamin D; Future  -     Urinalysis; Future    Smokes  -     CBC Auto Differential; Future; Expected date: 05/17/2023  -     Lipid Panel; Future  -     TSH; Future  -     Vitamin D; Future  -     Urinalysis; Future    Wellness examination  -     CBC Auto Differential; Future; Expected date: 05/17/2023  -     Lipid Panel; Future  -     TSH; Future  -     Vitamin D; Future  -     Urinalysis; Future    Obesity due to excess calories, unspecified classification, unspecified whether serious comorbidity present  -     semaglutide, weight loss, (WEGOVY) 1 mg/0.5 mL PnIj; Inject 1 mg into the skin every 7 days.  Dispense: 4 each; Refill: 11    Gastroesophageal reflux disease, unspecified whether esophagitis present    Other orders  -     citalopram (CELEXA) 10 MG tablet; Take 1 tablet (10 mg total) by mouth once daily.  Dispense: 90 tablet; Refill: 3

## 2023-07-07 ENCOUNTER — TELEPHONE (OUTPATIENT)
Dept: EMERGENCY MEDICINE | Facility: HOSPITAL | Age: 38
End: 2023-07-07
Payer: COMMERCIAL

## 2023-07-07 DIAGNOSIS — H60.91 OTITIS EXTERNA OF RIGHT EAR, UNSPECIFIED CHRONICITY, UNSPECIFIED TYPE: Primary | ICD-10-CM

## 2023-07-07 RX ORDER — OFLOXACIN 3 MG/ML
10 SOLUTION AURICULAR (OTIC) 2 TIMES DAILY
Qty: 10 ML | Refills: 0 | Status: SHIPPED | OUTPATIENT
Start: 2023-07-07 | End: 2023-07-14

## 2023-07-10 ENCOUNTER — TELEPHONE (OUTPATIENT)
Dept: EMERGENCY MEDICINE | Facility: HOSPITAL | Age: 38
End: 2023-07-10
Payer: COMMERCIAL

## 2023-07-10 RX ORDER — AMOXICILLIN AND CLAVULANATE POTASSIUM 875; 125 MG/1; MG/1
1 TABLET, FILM COATED ORAL 2 TIMES DAILY
Qty: 14 TABLET | Refills: 0 | Status: SHIPPED | OUTPATIENT
Start: 2023-07-10 | End: 2023-07-17

## 2023-07-11 RX ORDER — LISDEXAMFETAMINE DIMESYLATE 40 MG/1
40 CAPSULE ORAL EVERY MORNING
Qty: 90 CAPSULE | Refills: 0 | OUTPATIENT
Start: 2023-07-11

## 2023-07-25 RX ORDER — LISDEXAMFETAMINE DIMESYLATE 40 MG/1
40 CAPSULE ORAL EVERY MORNING
Qty: 90 CAPSULE | Refills: 0 | Status: SHIPPED | OUTPATIENT
Start: 2023-07-25 | End: 2023-11-21 | Stop reason: SDUPTHER

## 2023-07-25 NOTE — TELEPHONE ENCOUNTER
Patient is requesting a refill on Vyvanse 90 day supply with refills. Patient states she has been out of medication for a few weeks.        Patient is requesting an Rx for Mounjaro because she is unable to get the Wegovy due to supply being out at all pharmacies in her area.     -----------------------------------------------------------------------  ----- Message from Zulma Gonzáles sent at 7/25/2023  9:26 AM CDT -----  Type:  Needs Medical Advice    Who Called: pt  Symptoms (please be specific):    How long has patient had these symptoms:    Pharmacy name and phone #:    Would the patient rather a call back or a response via [facility]?   Best Call Back Number:   Additional Information: pt wants to speak to the office about the below medicine.         semaglutide, weight loss, (WEGOVY) 1 mg/0.5 mL PnIj 4 each 11   Pharmacy  [id number] - [place] - [address]   Associated Diagnoses  Obesity due to excess calories, unspecified classification, unspecified whether serious comorbidity present

## 2023-07-25 NOTE — TELEPHONE ENCOUNTER
----- Message from Zulma Gonzáles sent at 7/25/2023  9:24 AM CDT -----  Type:  RX Refill Request    Who Called:       lisdexamfetamine (VYVANSE) 40 MG Cap 90 capsule 0 2/27/2023  No  Sig - Route: Take 1 capsule (40 mg total) by mouth every morning. For ADD - Oral  Sent to pharmacy as: lisdexamfetamine (VYVANSE) 40 MG Cap  Class: Normal  Earliest Fill Date: 2/27/2023  Order: [id number]  Date/Time Signed: 2/27/2023 11:31      E-Prescribing Status: Receipt confirmed by pharmacy (2/27/2023 11:31 AM CST)    Prior authorization: Payer Waiting for Response  [facility] #[id number] - [place], [place] - [address]        Would the patient rather a call back or a response via [facility]?   Best Call Back Number:  Additional Information:

## 2023-07-26 ENCOUNTER — PATIENT MESSAGE (OUTPATIENT)
Dept: FAMILY MEDICINE | Facility: CLINIC | Age: 38
End: 2023-07-26
Payer: COMMERCIAL

## 2023-07-31 ENCOUNTER — PATIENT MESSAGE (OUTPATIENT)
Dept: FAMILY MEDICINE | Facility: CLINIC | Age: 38
End: 2023-07-31
Payer: COMMERCIAL

## 2023-07-31 RX ORDER — SEMAGLUTIDE 1.7 MG/.75ML
1.7 INJECTION, SOLUTION SUBCUTANEOUS
Qty: 3 ML | Refills: 11 | Status: SHIPPED | OUTPATIENT
Start: 2023-07-31

## 2023-08-18 ENCOUNTER — LAB VISIT (OUTPATIENT)
Dept: LAB | Facility: HOSPITAL | Age: 38
End: 2023-08-18
Attending: FAMILY MEDICINE
Payer: COMMERCIAL

## 2023-08-18 DIAGNOSIS — Z00.00 WELLNESS EXAMINATION: ICD-10-CM

## 2023-08-18 DIAGNOSIS — D64.9 ANEMIA, UNSPECIFIED TYPE: ICD-10-CM

## 2023-08-18 DIAGNOSIS — F90.2 ATTENTION DEFICIT HYPERACTIVITY DISORDER (ADHD), COMBINED TYPE: ICD-10-CM

## 2023-08-18 DIAGNOSIS — F17.200 SMOKES: ICD-10-CM

## 2023-08-18 LAB
25(OH)D3+25(OH)D2 SERPL-MCNC: 23 NG/ML (ref 30–96)
BASOPHILS # BLD AUTO: 0.04 K/UL (ref 0–0.2)
BASOPHILS NFR BLD: 0.6 % (ref 0–1.9)
CHOLEST SERPL-MCNC: 122 MG/DL (ref 120–199)
CHOLEST/HDLC SERPL: 2.4 {RATIO} (ref 2–5)
DIFFERENTIAL METHOD: ABNORMAL
EOSINOPHIL # BLD AUTO: 0.1 K/UL (ref 0–0.5)
EOSINOPHIL NFR BLD: 2 % (ref 0–8)
ERYTHROCYTE [DISTWIDTH] IN BLOOD BY AUTOMATED COUNT: 17.4 % (ref 11.5–14.5)
HCT VFR BLD AUTO: 28.2 % (ref 37–48.5)
HDLC SERPL-MCNC: 51 MG/DL (ref 40–75)
HDLC SERPL: 41.8 % (ref 20–50)
HGB BLD-MCNC: 8.1 G/DL (ref 12–16)
IMM GRANULOCYTES # BLD AUTO: 0.02 K/UL (ref 0–0.04)
IMM GRANULOCYTES NFR BLD AUTO: 0.3 % (ref 0–0.5)
LDLC SERPL CALC-MCNC: 65.4 MG/DL (ref 63–159)
LYMPHOCYTES # BLD AUTO: 2.4 K/UL (ref 1–4.8)
LYMPHOCYTES NFR BLD: 34.3 % (ref 18–48)
MCH RBC QN AUTO: 22.7 PG (ref 27–31)
MCHC RBC AUTO-ENTMCNC: 28.7 G/DL (ref 32–36)
MCV RBC AUTO: 79 FL (ref 82–98)
MONOCYTES # BLD AUTO: 0.5 K/UL (ref 0.3–1)
MONOCYTES NFR BLD: 7.7 % (ref 4–15)
NEUTROPHILS # BLD AUTO: 3.8 K/UL (ref 1.8–7.7)
NEUTROPHILS NFR BLD: 55.1 % (ref 38–73)
NONHDLC SERPL-MCNC: 71 MG/DL
NRBC BLD-RTO: 0 /100 WBC
PLATELET # BLD AUTO: 558 K/UL (ref 150–450)
PMV BLD AUTO: 9.6 FL (ref 9.2–12.9)
RBC # BLD AUTO: 3.57 M/UL (ref 4–5.4)
TRIGL SERPL-MCNC: 28 MG/DL (ref 30–150)
TSH SERPL DL<=0.005 MIU/L-ACNC: 1.14 UIU/ML (ref 0.4–4)
WBC # BLD AUTO: 6.97 K/UL (ref 3.9–12.7)

## 2023-08-18 PROCEDURE — 36415 COLL VENOUS BLD VENIPUNCTURE: CPT | Mod: PO | Performed by: FAMILY MEDICINE

## 2023-08-18 PROCEDURE — 80061 LIPID PANEL: CPT | Performed by: FAMILY MEDICINE

## 2023-08-18 PROCEDURE — 85025 COMPLETE CBC W/AUTO DIFF WBC: CPT | Performed by: FAMILY MEDICINE

## 2023-08-18 PROCEDURE — 84443 ASSAY THYROID STIM HORMONE: CPT | Performed by: FAMILY MEDICINE

## 2023-08-18 PROCEDURE — 82306 VITAMIN D 25 HYDROXY: CPT | Performed by: FAMILY MEDICINE

## 2023-08-28 NOTE — TELEPHONE ENCOUNTER
Care Due:                  Date            Visit Type   Department     Provider  --------------------------------------------------------------------------------                                ESTABLISHED                              PATIENT -    [facility] FAMILY  Last Visit: 05-      [place]      MEDICINE       Reinier Madera  Next Visit: None Scheduled  None         None Found                                                            Last  Test          Frequency    Reason                     Performed    Due Date  --------------------------------------------------------------------------------    HBA1C.......  6 months...  semaglutide,.............  04-   10-    [facility] Embedded Care Due Messages. Reference number: [id number].   8/28/2023 8:50:25 AM CDT

## 2023-08-29 RX ORDER — TRAMADOL HYDROCHLORIDE 50 MG/1
50 TABLET ORAL DAILY PRN
Qty: 30 EACH | Refills: 0 | Status: SHIPPED | OUTPATIENT
Start: 2023-08-29

## 2023-08-29 RX ORDER — OMEPRAZOLE 20 MG/1
20 CAPSULE, DELAYED RELEASE ORAL DAILY
Qty: 90 CAPSULE | Refills: 3 | Status: SHIPPED | OUTPATIENT
Start: 2023-08-29 | End: 2024-08-28

## 2023-08-29 RX ORDER — NYSTATIN 100000 [USP'U]/G
POWDER TOPICAL 2 TIMES DAILY
Qty: 60 G | Refills: 11 | Status: SHIPPED | OUTPATIENT
Start: 2023-08-29

## 2023-09-19 ENCOUNTER — OFFICE VISIT (OUTPATIENT)
Dept: OBSTETRICS AND GYNECOLOGY | Facility: CLINIC | Age: 38
End: 2023-09-19
Payer: COMMERCIAL

## 2023-09-19 VITALS — WEIGHT: 172 LBS | BODY MASS INDEX: 28.62 KG/M2 | DIASTOLIC BLOOD PRESSURE: 84 MMHG | SYSTOLIC BLOOD PRESSURE: 126 MMHG

## 2023-09-19 DIAGNOSIS — Z01.419 WELL WOMAN EXAM WITH ROUTINE GYNECOLOGICAL EXAM: Primary | ICD-10-CM

## 2023-09-19 PROCEDURE — 1160F RVW MEDS BY RX/DR IN RCRD: CPT | Mod: CPTII,S$GLB,, | Performed by: OBSTETRICS & GYNECOLOGY

## 2023-09-19 PROCEDURE — 3008F PR BODY MASS INDEX (BMI) DOCUMENTED: ICD-10-PCS | Mod: CPTII,S$GLB,, | Performed by: OBSTETRICS & GYNECOLOGY

## 2023-09-19 PROCEDURE — 99999 PR PBB SHADOW E&M-EST. PATIENT-LVL III: CPT | Mod: PBBFAC,,, | Performed by: OBSTETRICS & GYNECOLOGY

## 2023-09-19 PROCEDURE — 1159F PR MEDICATION LIST DOCUMENTED IN MEDICAL RECORD: ICD-10-PCS | Mod: CPTII,S$GLB,, | Performed by: OBSTETRICS & GYNECOLOGY

## 2023-09-19 PROCEDURE — 3079F DIAST BP 80-89 MM HG: CPT | Mod: CPTII,S$GLB,, | Performed by: OBSTETRICS & GYNECOLOGY

## 2023-09-19 PROCEDURE — 1159F MED LIST DOCD IN RCRD: CPT | Mod: CPTII,S$GLB,, | Performed by: OBSTETRICS & GYNECOLOGY

## 2023-09-19 PROCEDURE — 99395 PR PREVENTIVE VISIT,EST,18-39: ICD-10-PCS | Mod: S$GLB,,, | Performed by: OBSTETRICS & GYNECOLOGY

## 2023-09-19 PROCEDURE — 81514 NFCT DS BV&VAGINITIS DNA ALG: CPT | Performed by: OBSTETRICS & GYNECOLOGY

## 2023-09-19 PROCEDURE — 88175 CYTOPATH C/V AUTO FLUID REDO: CPT | Performed by: OBSTETRICS & GYNECOLOGY

## 2023-09-19 PROCEDURE — 99395 PREV VISIT EST AGE 18-39: CPT | Mod: S$GLB,,, | Performed by: OBSTETRICS & GYNECOLOGY

## 2023-09-19 PROCEDURE — 3008F BODY MASS INDEX DOCD: CPT | Mod: CPTII,S$GLB,, | Performed by: OBSTETRICS & GYNECOLOGY

## 2023-09-19 PROCEDURE — 3074F SYST BP LT 130 MM HG: CPT | Mod: CPTII,S$GLB,, | Performed by: OBSTETRICS & GYNECOLOGY

## 2023-09-19 PROCEDURE — 1160F PR REVIEW ALL MEDS BY PRESCRIBER/CLIN PHARMACIST DOCUMENTED: ICD-10-PCS | Mod: CPTII,S$GLB,, | Performed by: OBSTETRICS & GYNECOLOGY

## 2023-09-19 PROCEDURE — 3079F PR MOST RECENT DIASTOLIC BLOOD PRESSURE 80-89 MM HG: ICD-10-PCS | Mod: CPTII,S$GLB,, | Performed by: OBSTETRICS & GYNECOLOGY

## 2023-09-19 PROCEDURE — 3074F PR MOST RECENT SYSTOLIC BLOOD PRESSURE < 130 MM HG: ICD-10-PCS | Mod: CPTII,S$GLB,, | Performed by: OBSTETRICS & GYNECOLOGY

## 2023-09-19 PROCEDURE — 99999 PR PBB SHADOW E&M-EST. PATIENT-LVL III: ICD-10-PCS | Mod: PBBFAC,,, | Performed by: OBSTETRICS & GYNECOLOGY

## 2023-09-19 NOTE — PROGRESS NOTES
HPI:   38 y.o.   OB History          3    Para   2    Term   1       1    AB   0    Living   2         SAB   0    IAB   0    Ectopic   0    Multiple   0    Live Births   2              Patient's last menstrual period was 2023.    Patient is  here for her annual gynecologic exam.  She has no complaints.     ROS:  GENERAL: No fever, chills, fatigability or weight loss.  SKIN: No rashes, itching or changes in color or texture of skin.  HEAD: No headaches or recent head trauma.  EYES: Visual acuity fine. No photophobia, ocular pain or diplopia.  EARS: Denies ear pain, discharge or vertigo.  NOSE: No loss of smell, no epistaxis or postnasal drip.  MOUTH & THROAT: No hoarseness or change in voice. No excessive gum bleeding.  NODES: Denies swollen glands.  CHEST: Denies SWEET, cyanosis, wheezing, cough and sputum production.  CARDIOVASCULAR: Denies chest pain, PND, orthopnea or reduced exercise tolerance.  ABDOMEN: Appetite fine. No weight loss. Denies diarrhea, abdominal pain, hematemesis or blood in stool.  URINARY: No flank pain, dysuria or hematuria.  PERIPHERAL VASCULAR: No claudication or cyanosis.  MUSCULOSKELETAL: No joint stiffness or swelling. Denies back pain.  NEUROLOGIC: No history of seizures, paralysis, alteration of gait or coordination.    PE:   /84   Wt 78 kg (172 lb)   LMP 2023   BMI 28.62 kg/m²   APPEARANCE: Well nourished, well developed, in no acute distress.  NECK: Neck symmetric without masses or thyromegaly.  BREASTS: Symmetrical, no skin changes or visible lesions. No palpable masses, nipple discharge or adenopathy bilaterally.  ABDOMEN: Flat. Soft. No tenderness or masses. No hepatosplenomegaly. No hernias. No CVA tenderness.  VULVA: No lesions. Normal female genitalia.  URETHRAL MEATUS: Normal size and location, no lesions, no prolapse.  URETHRA: No masses, tenderness, prolapse or scarring.  VAGINA: Moist and well rugated, no discharge, no significant cystocele  or rectocele.  CERVIX: No lesions and discharge. PAP done.  UTERUS: Normal size, regular shape, mobile, non-tender, bladder base nontender.  ADNEXA: No masses, tenderness or CDS nodularity.  ANUS PERINEUM: Normal.    PROCEDURES:  Pap smear    Assessment:  Normal Gynecologic Exam    Plan:  Mammogram and Colonoscopy if indicated by current recommendations.  Return to clinic in one year or for any problems or complaints.  Heavy cycles, wants ablation  Discussd in detai, failure risks  Prev section x2, some pain issues incision  Disc iud,  with or without tubal,  Rev ct scan

## 2023-09-20 LAB
BACTERIAL VAGINOSIS DNA: POSITIVE
CANDIDA GLABRATA DNA: NEGATIVE
CANDIDA KRUSEI DNA: NEGATIVE
CANDIDA RRNA VAG QL PROBE: NEGATIVE
T VAGINALIS RRNA GENITAL QL PROBE: NEGATIVE

## 2023-09-21 ENCOUNTER — TELEPHONE (OUTPATIENT)
Dept: OBSTETRICS AND GYNECOLOGY | Facility: CLINIC | Age: 38
End: 2023-09-21
Payer: COMMERCIAL

## 2023-09-21 ENCOUNTER — PATIENT MESSAGE (OUTPATIENT)
Dept: OBSTETRICS AND GYNECOLOGY | Facility: CLINIC | Age: 38
End: 2023-09-21
Payer: COMMERCIAL

## 2023-09-21 RX ORDER — METRONIDAZOLE 500 MG/1
500 TABLET ORAL 3 TIMES DAILY
Qty: 15 TABLET | Refills: 2 | Status: SHIPPED | OUTPATIENT
Start: 2023-09-21

## 2023-09-22 LAB
FINAL PATHOLOGIC DIAGNOSIS: NORMAL
Lab: NORMAL

## 2023-09-25 ENCOUNTER — TELEPHONE (OUTPATIENT)
Dept: OBSTETRICS AND GYNECOLOGY | Facility: CLINIC | Age: 38
End: 2023-09-25
Payer: COMMERCIAL

## 2023-09-25 DIAGNOSIS — N92.0 MENORRHAGIA WITH REGULAR CYCLE: Primary | ICD-10-CM

## 2023-10-10 ENCOUNTER — PATIENT MESSAGE (OUTPATIENT)
Dept: OBSTETRICS AND GYNECOLOGY | Facility: CLINIC | Age: 38
End: 2023-10-10
Payer: COMMERCIAL

## 2023-10-18 ENCOUNTER — TELEPHONE (OUTPATIENT)
Dept: OBSTETRICS AND GYNECOLOGY | Facility: CLINIC | Age: 38
End: 2023-10-18
Payer: COMMERCIAL

## 2023-10-18 DIAGNOSIS — N92.0 MENORRHAGIA WITH REGULAR CYCLE: Primary | ICD-10-CM

## 2023-10-20 ENCOUNTER — TELEPHONE (OUTPATIENT)
Dept: OBSTETRICS AND GYNECOLOGY | Facility: CLINIC | Age: 38
End: 2023-10-20
Payer: COMMERCIAL

## 2023-10-31 ENCOUNTER — TELEPHONE (OUTPATIENT)
Dept: OBSTETRICS AND GYNECOLOGY | Facility: CLINIC | Age: 38
End: 2023-10-31
Payer: COMMERCIAL

## 2023-11-08 ENCOUNTER — TELEPHONE (OUTPATIENT)
Dept: OBSTETRICS AND GYNECOLOGY | Facility: CLINIC | Age: 38
End: 2023-11-08
Payer: COMMERCIAL

## 2023-11-08 ENCOUNTER — PATIENT MESSAGE (OUTPATIENT)
Dept: OBSTETRICS AND GYNECOLOGY | Facility: CLINIC | Age: 38
End: 2023-11-08
Payer: COMMERCIAL

## 2023-11-08 NOTE — TELEPHONE ENCOUNTER
----- Message from Michael A. Wiedemann, MD sent at 10/18/2023  6:32 AM CDT -----  Please set up ablation for menorrhagia   Thanks   yay

## 2023-11-08 NOTE — TELEPHONE ENCOUNTER
Left several messages for pt to call and be scheduled for ablation. Pt has not returned my call. A message was sent through the portal to call the office for scheduling.

## 2023-11-13 RX ORDER — LISDEXAMFETAMINE DIMESYLATE 40 MG/1
40 CAPSULE ORAL EVERY MORNING
Qty: 90 CAPSULE | Refills: 0 | OUTPATIENT
Start: 2023-11-13

## 2023-11-13 NOTE — TELEPHONE ENCOUNTER
Care Due:                  Date            Visit Type   Department     Provider  --------------------------------------------------------------------------------                                ESTABLISHED                              PATIENT -    [facility] FAMILY  Last Visit: 05-      [employer]      MEDICINE       Reinier [place]  Next Visit: None Scheduled  None         None Found                                                            Last  Test          Frequency    Reason                     Performed    Due Date  --------------------------------------------------------------------------------    HBA1C.......  6 months...  semaglutide,.............  04-   10-    [facility] Embedded Care Due Messages. Reference number: [id number].   11/13/2023 7:02:28 AM CST

## 2023-11-14 ENCOUNTER — PATIENT MESSAGE (OUTPATIENT)
Dept: FAMILY MEDICINE | Facility: CLINIC | Age: 38
End: 2023-11-14
Payer: COMMERCIAL

## 2023-11-21 ENCOUNTER — PATIENT MESSAGE (OUTPATIENT)
Dept: FAMILY MEDICINE | Facility: CLINIC | Age: 38
End: 2023-11-21
Payer: COMMERCIAL

## 2023-11-21 RX ORDER — LISDEXAMFETAMINE DIMESYLATE 40 MG/1
40 CAPSULE ORAL EVERY MORNING
Qty: 90 CAPSULE | Refills: 0 | Status: SHIPPED | OUTPATIENT
Start: 2023-11-21

## 2023-11-21 NOTE — TELEPHONE ENCOUNTER
No care due was identified.  [facility] Embedded Care Due Messages. Reference number: [id number].   11/21/2023 8:10:10 AM CST

## 2024-03-01 NOTE — PROGRESS NOTES
GRAY aug 20  19 weeks, fht good  Baby actvie  cutlures done for odor  Labs, us   
FAMILY HISTORY:  No pertinent family history in first degree relatives

## 2024-03-14 ENCOUNTER — ANESTHESIA EVENT (OUTPATIENT)
Dept: SURGERY | Facility: HOSPITAL | Age: 39
End: 2024-03-14
Payer: COMMERCIAL

## 2024-03-27 ENCOUNTER — LAB VISIT (OUTPATIENT)
Dept: LAB | Facility: HOSPITAL | Age: 39
End: 2024-03-27
Attending: OBSTETRICS & GYNECOLOGY
Payer: COMMERCIAL

## 2024-03-27 DIAGNOSIS — D64.9 ANEMIA, UNSPECIFIED TYPE: ICD-10-CM

## 2024-03-27 LAB
ANION GAP SERPL CALC-SCNC: 5 MMOL/L (ref 8–16)
BASOPHILS # BLD AUTO: 0.04 K/UL (ref 0–0.2)
BASOPHILS NFR BLD: 0.8 % (ref 0–1.9)
BUN SERPL-MCNC: 15 MG/DL (ref 6–20)
CALCIUM SERPL-MCNC: 8.7 MG/DL (ref 8.7–10.5)
CHLORIDE SERPL-SCNC: 109 MMOL/L (ref 95–110)
CO2 SERPL-SCNC: 26 MMOL/L (ref 23–29)
CREAT SERPL-MCNC: 0.7 MG/DL (ref 0.5–1.4)
DIFFERENTIAL METHOD BLD: ABNORMAL
EOSINOPHIL # BLD AUTO: 0.2 K/UL (ref 0–0.5)
EOSINOPHIL NFR BLD: 3.2 % (ref 0–8)
ERYTHROCYTE [DISTWIDTH] IN BLOOD BY AUTOMATED COUNT: 18.6 % (ref 11.5–14.5)
EST. GFR  (NO RACE VARIABLE): >60 ML/MIN/1.73 M^2
GLUCOSE SERPL-MCNC: 86 MG/DL (ref 70–110)
HCT VFR BLD AUTO: 26.1 % (ref 37–48.5)
HGB BLD-MCNC: 7.2 G/DL (ref 12–16)
IMM GRANULOCYTES # BLD AUTO: 0.01 K/UL (ref 0–0.04)
IMM GRANULOCYTES NFR BLD AUTO: 0.2 % (ref 0–0.5)
LYMPHOCYTES # BLD AUTO: 1.8 K/UL (ref 1–4.8)
LYMPHOCYTES NFR BLD: 33.1 % (ref 18–48)
MCH RBC QN AUTO: 20.6 PG (ref 27–31)
MCHC RBC AUTO-ENTMCNC: 27.6 G/DL (ref 32–36)
MCV RBC AUTO: 75 FL (ref 82–98)
MONOCYTES # BLD AUTO: 0.4 K/UL (ref 0.3–1)
MONOCYTES NFR BLD: 8.1 % (ref 4–15)
NEUTROPHILS # BLD AUTO: 2.9 K/UL (ref 1.8–7.7)
NEUTROPHILS NFR BLD: 54.6 % (ref 38–73)
NRBC BLD-RTO: 0 /100 WBC
PLATELET # BLD AUTO: 572 K/UL (ref 150–450)
PMV BLD AUTO: 8.6 FL (ref 9.2–12.9)
POTASSIUM SERPL-SCNC: 3.4 MMOL/L (ref 3.5–5.1)
RBC # BLD AUTO: 3.5 M/UL (ref 4–5.4)
SODIUM SERPL-SCNC: 140 MMOL/L (ref 136–145)
WBC # BLD AUTO: 5.29 K/UL (ref 3.9–12.7)

## 2024-03-27 PROCEDURE — 85025 COMPLETE CBC W/AUTO DIFF WBC: CPT | Performed by: NURSE PRACTITIONER

## 2024-03-27 PROCEDURE — 36415 COLL VENOUS BLD VENIPUNCTURE: CPT | Performed by: NURSE PRACTITIONER

## 2024-03-27 PROCEDURE — 80048 BASIC METABOLIC PNL TOTAL CA: CPT | Performed by: NURSE PRACTITIONER

## 2024-04-12 ENCOUNTER — HOSPITAL ENCOUNTER (OUTPATIENT)
Dept: PREADMISSION TESTING | Facility: HOSPITAL | Age: 39
Discharge: HOME | End: 2024-04-12
Attending: NURSE PRACTITIONER
Payer: COMMERCIAL

## 2024-04-12 NOTE — DISCHARGE INSTRUCTIONS
Your surgery is scheduled for 4/29/24.    Please report to [facility] L[facility] on the 1st Floor at 730 a.m.    THIS TIME IS SUBJECT TO CHANGE.  YOU WILL RECEIVE A PHONE CALL THE DAY BEFORE SURGERY BY 3:30 PM TO CONFIRM YOUR TIME OF ARRIVAL.  IF YOU HAVE NOT RECEIVED A PHONE CALL BY 3:30 PM THE DAY BEFORE YOUR SURGERY PLEASE CALL [phone]     INSTRUCTIONS IMPORTANT!!!  ¨ Do not eat or drink after 12 midnight-including water, candy, gum, & mints. OK to brush teeth.      ____  Proceed to [facility] on *** for additional testing.        ____  Do not wear makeup, including mascara.  ____  No powder, lotions or creams to surgical area.  ____  Please remove all jewelry, including piercings and leave at home.  ____  No money or valuables needed. Please leave at home.  ____  Please bring any documents given by your doctor.  ____  If going home the same day, arrange for a ride home. You will not be able to             drive if Anesthesia was used.  ____  Children under 18 years require a parent / guardian present the entire time             they are in surgery / recovery.  ____  Wear loose fitting clothing. Allow for dressings, bandages.  ____  Stop Aspirin, Ibuprofen, Motrin, Aleve, Goody's/BC powders, Excedrine and Naproxen (NSAIDS) at least 3-5 days before surgery, unless otherwise instructed by your doctor, or the nurse.   You MAY use Tylenol/acetaminophen until day of surgery.  ____  Wash the surgical area with Hibiclens or Dial Antibacterial bar soap the night before surgery, and again the             morning of surgery.  Be sure to rinse hibiclens or Dial Antibacterial bar soap off completely (if instructed by   nurse).  ____  If you take diabetic medication including Metformin, Glimepiride, Glipizide, Glyburide, Byetta, Januvia, Actos, do not take am of surgery unless instructed by Doctor.  ____ Hold Invokana, Farxiga, and Jardiance for 3 days prior to surgery.   ____  Call MD for temperature  above 101 degrees or any other signs of infection such as Urinary (bladder) infection, Upper respiratory infection, skin boils, etc.  ____ Stop taking any Fish Oil supplement or any Vitamins that contain Vitamin E at least 5 days prior to surgery.  ____ Do Not wear your contact lenses the day of your procedure.  You may wear your glasses.      ____Do not shave surgical site for 3 days prior to surgery.  ____ Practice Good hand washing before, during, and after procedure.      I have read or had read and explained to me, and understand the above information.  Additional comments or instructions:  For additional questions call [phone]      ANESTHESIA SIDE EFFECTS  -For the first 24 hours after surgery:  Do not drive, use heavy equipment, make important decisions, or drink alcohol  -It is normal to feel sleepy for several hours.  Rest until you are more awake.  -Have someone stay with you, if needed.  They can watch for problems and help keep you safe.  -Some possible post anesthesia side effects include: nausea and vomiting, sore throat and hoarseness, sleepiness, and dizziness.        Pre-Op Bathing Instructions    Before surgery, you can play an important role in your own health.    Because skin is not sterile, we need to be sure that your skin is as free of germs as possible. By following the instructions below, you can reduce the number of germs on your skin before surgery.    IMPORTANT: You will need to shower with a special soap called Hibiclens*, available at any pharmacy.  If you are allergic to Chlorhexidine (the antiseptic in Hibiclens), use an antibacterial soap such as Dial Soap for your preoperative shower.  You will shower with Hibiclens both the night before your surgery and the morning of your surgery.  Do not use Hibiclens on the head, face or genitals to avoid injury to those areas.    STEP #1: THE NIGHT BEFORE YOUR SURGERY     Do not shave the area of your body where your surgery will be  performed.  Shower and wash your hair and body as usual with your normal soap and shampoo.  Rinse your hair and body thoroughly after you shower to remove all soap residue.  With your hand, apply one packet of Hibiclens soap to the surgical site.   Wash the site gently for five (5) minutes. Do not scrub your skin too hard.   Do not wash with your regular soap after Hibiclens is used.  Rinse your body thoroughly.  Pat yourself dry with a clean, soft towel.  Do not use lotion, cream, or powder.  Wear clean clothes.    STEP #2: THE MORNING OF YOUR SURGERY     Repeat Step #1.    * Not to be used by people allergic to Chlorhexidine.      Stop Wegovy 7 days prior to surgery.

## 2024-04-12 NOTE — ANESTHESIA PREPROCEDURE EVALUATION
2024  Corrine Gan is a 39 y.o., female scheduled for  ABLATION, ENDOMETRIUM (Abdomen) 24.    Past Medical History:   Diagnosis Date    Coronary artery disease     large heart noted on CXR , No problems    Diabetes mellitus     previous and current pregnancy, diet controlled. Gestational DM only    Gestational diabetes     Hypertension     preEclampsia    Pregnancy     x 2; preecalmpsia for second one     Past Surgical History:   Procedure Laterality Date     SECTION      gastric sleeve      TONSILLECTOMY       Review of patient's allergies indicates:  No Known Allergies    Current Outpatient Medications   Medication Instructions    citalopram (CELEXA) 10 mg, Oral, Daily    HYDROcodone-acetaminophen (NORCO) 5-325 mg per tablet 0.5-1 tablets, Oral, 4 times daily PRN    lisdexamfetamine (VYVANSE) 40 mg, Oral, Every morning, For ADD    methocarbamoL (ROBAXIN) 750 mg, Oral    metroNIDAZOLE (FLAGYL) 500 mg, Oral, 3 times daily    nystatin (MYCOSTATIN) powder Topical (Top), 2 times daily    omeprazole (PRILOSEC) 20 mg, Oral, Daily    ondansetron (ZOFRAN-ODT) 4 mg, Oral, Every 6 hours PRN    semaglutide, weight loss, (WEGOVY) 1.7 mg/0.75 mL PnIj Inject 1.7 mg (one pen) into the skin every 7 days.    traMADoL (ULTRAM) 50 mg, Oral, Daily PRN, For foot pain    WEGOVY 0.5 mg, Subcutaneous, Every 7 days       Pre-op Assessment    I have reviewed the Patient Summary Reports.     I have reviewed the Nursing Notes.    I have reviewed the Medications.     Review of Systems  Anesthesia Hx:   History of prior surgery of interest to airway management or planning: (gastric sleeve )           Personal Hx of Anesthesia complications (anxiety following MAC anesthesia per patient report)                    Social:  Smoker, Vaping, Social Alcohol Use Vaping with nicotine      Hematology/Oncology:       --  Anemia:                                  Cardiovascular:  Exercise tolerance: good   Denies Pacemaker. Hypertension   CAD       Denies Angina.                                  Pulmonary:      Shortness of breath (attributed to anemia per patient report)                  Hepatic/GI:     GERD, well controlled             Neurological:  Denies TIA.  Denies CVA. Neuromuscular Disease,   Denies Seizures.                                Endocrine:  Diabetes (gestational diabetes only) Denies Hypothyroidism.  Denies Hyperthyroidism.         Psych:  Psychiatric History  depression                Physical Exam  General: Cooperative and Oriented    Airway:  Neck ROM: Normal ROM    Dental:  Intact      Lab Results   Component Value Date    WBC 5.29 03/27/2024    HGB 7.2 (L) 03/27/2024    HCT 26.1 (L) 03/27/2024     (H) 03/27/2024    CHOL 122 08/18/2023    TRIG 28 (L) 08/18/2023    HDL 51 08/18/2023    ALT 13 04/04/2023    AST 13 04/04/2023     03/27/2024    K 3.4 (L) 03/27/2024     03/27/2024    CREATININE 0.7 03/27/2024    BUN 15 03/27/2024    CO2 26 03/27/2024    TSH 1.143 08/18/2023    INR 0.9 07/31/2017    HGBA1C 5.4 04/28/2022     Results for orders placed or performed in visit on 09/12/19   IN OFFICE EKG 12-LEAD (to [place])    Collection Time: 09/12/19  8:13 AM    Narrative    Test Reason : Z71.6,Z01.818,    Vent. Rate : 069 BPM     Atrial Rate : 069 BPM     P-R Int : 128 ms          QRS Dur : 086 ms      QT Int : 406 ms       P-R-T Axes : 055 075 068 degrees     QTc Int : 435 ms    Normal sinus rhythm  Normal ECG  When compared with ECG of 31-JUL-2017 23:14,  No significant change was found  Confirmed by Levy Soriano MD [id number]) on 9/13/2019 6:08:30 PM    Referred By: ANGEL   SELF           Confirmed By:Levy Soriano MD         Anesthesia Plan  Type of Anesthesia, risks & benefits discussed:    Anesthesia Type: Gen Natural Airway  Intra-op Monitoring Plan: Standard ASA Monitors  Post Op Pain  Control Plan: multimodal analgesia  Induction:  IV  Informed Consent: Informed consent signed with the Patient and all parties understand the risks and agree with anesthesia plan.  All questions answered.   ASA Score: 2  Day of Surgery Review of History & Physical: H&P Update referred to the surgeon/provider.  Anesthesia Plan Notes: Anesthesia consent to be signed prior to surgery 4/29/24      Ready For Surgery From Anesthesia Perspective.     .

## 2024-04-12 NOTE — PRE-PROCEDURE INSTRUCTIONS
Grandmother.    Allergies, medical, surgical, family and psychosocial histories reviewed with patient. Periop plan of care reviewed. Preop instructions given, including medications to take and to hold. Hibiclens soap and instructions on use given. Time allotted for questions to be addressed.  Patient verbalized understanding.    Arrival time 0730.    She does not take her medications at scheduled times due to working nights.     Instructed to stop Wegovy 7 days prior to surgery.

## 2024-04-28 NOTE — H&P
Pt for hystreroscopy with dnc ablation  HPI:   38 y.o.   OB History            3    Para   2    Term   1       1    AB   0    Living   2           SAB   0    IAB   0    Ectopic   0    Multiple   0    Live Births   2                Patient's last menstrual period was 2023.    Patient is  here for her annual gynecologic exam.  She has no complaints.      ROS:  GENERAL: No fever, chills, fatigability or weight loss.  SKIN: No rashes, itching or changes in color or texture of skin.  HEAD: No headaches or recent head trauma.  EYES: Visual acuity fine. No photophobia, ocular pain or diplopia.  EARS: Denies ear pain, discharge or vertigo.  NOSE: No loss of smell, no epistaxis or postnasal drip.  MOUTH & THROAT: No hoarseness or change in voice. No excessive gum bleeding.  NODES: Denies swollen glands.  CHEST: Denies SWEET, cyanosis, wheezing, cough and sputum production.  CARDIOVASCULAR: Denies chest pain, PND, orthopnea or reduced exercise tolerance.  ABDOMEN: Appetite fine. No weight loss. Denies diarrhea, abdominal pain, hematemesis or blood in stool.  URINARY: No flank pain, dysuria or hematuria.  PERIPHERAL VASCULAR: No claudication or cyanosis.  MUSCULOSKELETAL: No joint stiffness or swelling. Denies back pain.  NEUROLOGIC: No history of seizures, paralysis, alteration of gait or coordination.     PE:   /84   Wt 78 kg (172 lb)   LMP 2023   BMI 28.62 kg/m²   APPEARANCE: Well nourished, well developed, in no acute distress.  NECK: Neck symmetric without masses or thyromegaly.  BREASTS: Symmetrical, no skin changes or visible lesions. No palpable masses, nipple discharge or adenopathy bilaterally.  ABDOMEN: Flat. Soft. No tenderness or masses. No hepatosplenomegaly. No hernias. No CVA tenderness.  VULVA: No lesions. Normal female genitalia.  URETHRAL MEATUS: Normal size and location, no lesions, no prolapse.  URETHRA: No masses, tenderness, prolapse or scarring.  VAGINA: Moist and well  rugated, no discharge, no significant cystocele or rectocele.  CERVIX: No lesions and discharge. PAP done.  UTERUS: Normal size, regular shape, mobile, non-tender, bladder base nontender.  ADNEXA: No masses, tenderness or CDS nodularity.  ANUS PERINEUM: Normal.     PROCEDURES:  Pap smear     Assessment:  Normal Gynecologic Exam     Plan:  Mammogram and Colonoscopy if indicated by current recommendations.  Return to clinic in one year or for any problems or complaints.  Heavy cycles, wants ablation  Discussd in detai, failure risks  Prev section x2, some pain issues incision

## 2024-04-29 ENCOUNTER — HOSPITAL ENCOUNTER (OUTPATIENT)
Facility: HOSPITAL | Age: 39
Discharge: HOME | End: 2024-04-29
Attending: OBSTETRICS & GYNECOLOGY | Admitting: OBSTETRICS & GYNECOLOGY
Payer: COMMERCIAL

## 2024-04-29 ENCOUNTER — TELEPHONE (OUTPATIENT)
Dept: OBSTETRICS AND GYNECOLOGY | Facility: CLINIC | Age: 39
End: 2024-04-29
Payer: COMMERCIAL

## 2024-04-29 ENCOUNTER — ANESTHESIA (OUTPATIENT)
Dept: SURGERY | Facility: HOSPITAL | Age: 39
End: 2024-04-29
Payer: COMMERCIAL

## 2024-04-29 VITALS
WEIGHT: 174 LBS | RESPIRATION RATE: 17 BRPM | TEMPERATURE: 97 F | OXYGEN SATURATION: 97 % | SYSTOLIC BLOOD PRESSURE: 119 MMHG | DIASTOLIC BLOOD PRESSURE: 74 MMHG | HEIGHT: 65 IN | HEART RATE: 66 BPM | BODY MASS INDEX: 28.99 KG/M2

## 2024-04-29 DIAGNOSIS — N92.0 MENORRHAGIA WITH REGULAR CYCLE: ICD-10-CM

## 2024-04-29 DIAGNOSIS — D64.9 ANEMIA, UNSPECIFIED TYPE: Primary | ICD-10-CM

## 2024-04-29 DIAGNOSIS — Z09 ENCOUNTER FOR EXAMINATION FOLLOWING SURGERY: Primary | ICD-10-CM

## 2024-04-29 LAB
B-HCG UR QL: NEGATIVE
CTP QC/QA: YES

## 2024-04-29 PROCEDURE — 63600175 PHARM REV CODE 636 W HCPCS: Performed by: OBSTETRICS & GYNECOLOGY

## 2024-04-29 PROCEDURE — 58563 HYSTEROSCOPY ABLATION: CPT | Mod: ,,, | Performed by: OBSTETRICS & GYNECOLOGY

## 2024-04-29 PROCEDURE — 37000008 HC ANESTHESIA 1ST 15 MINUTES: Performed by: OBSTETRICS & GYNECOLOGY

## 2024-04-29 PROCEDURE — 25000003 PHARM REV CODE 250: Performed by: ANESTHESIOLOGY

## 2024-04-29 PROCEDURE — 63600175 PHARM REV CODE 636 W HCPCS: Performed by: ANESTHESIOLOGY

## 2024-04-29 PROCEDURE — 37000009 HC ANESTHESIA EA ADD 15 MINS: Performed by: OBSTETRICS & GYNECOLOGY

## 2024-04-29 PROCEDURE — 63600175 PHARM REV CODE 636 W HCPCS: Performed by: NURSE ANESTHETIST, CERTIFIED REGISTERED

## 2024-04-29 PROCEDURE — 71000033 HC RECOVERY, INTIAL HOUR: Performed by: OBSTETRICS & GYNECOLOGY

## 2024-04-29 PROCEDURE — 71000016 HC POSTOP RECOV ADDL HR: Performed by: OBSTETRICS & GYNECOLOGY

## 2024-04-29 PROCEDURE — 36000706: Performed by: OBSTETRICS & GYNECOLOGY

## 2024-04-29 PROCEDURE — 36000707: Performed by: OBSTETRICS & GYNECOLOGY

## 2024-04-29 PROCEDURE — 27201423 OPTIME MED/SURG SUP & DEVICES STERILE SUPPLY: Performed by: OBSTETRICS & GYNECOLOGY

## 2024-04-29 PROCEDURE — 88305 TISSUE EXAM BY PATHOLOGIST: CPT | Mod: 26,,, | Performed by: PATHOLOGY

## 2024-04-29 PROCEDURE — D9220A PRA ANESTHESIA: Mod: ANES,,, | Performed by: ANESTHESIOLOGY

## 2024-04-29 PROCEDURE — 71000015 HC POSTOP RECOV 1ST HR: Performed by: OBSTETRICS & GYNECOLOGY

## 2024-04-29 PROCEDURE — D9220A PRA ANESTHESIA: Mod: CRNA,,, | Performed by: NURSE ANESTHETIST, CERTIFIED REGISTERED

## 2024-04-29 PROCEDURE — 25000003 PHARM REV CODE 250: Performed by: NURSE ANESTHETIST, CERTIFIED REGISTERED

## 2024-04-29 PROCEDURE — 25000003 PHARM REV CODE 250: Performed by: OBSTETRICS & GYNECOLOGY

## 2024-04-29 PROCEDURE — 88305 TISSUE EXAM BY PATHOLOGIST: CPT | Performed by: PATHOLOGY

## 2024-04-29 RX ORDER — PROPOFOL 10 MG/ML
VIAL (ML) INTRAVENOUS
Status: DISCONTINUED | OUTPATIENT
Start: 2024-04-29 | End: 2024-04-29

## 2024-04-29 RX ORDER — FENTANYL CITRATE 50 UG/ML
INJECTION, SOLUTION INTRAMUSCULAR; INTRAVENOUS
Status: DISCONTINUED | OUTPATIENT
Start: 2024-04-29 | End: 2024-04-29

## 2024-04-29 RX ORDER — SODIUM CHLORIDE, SODIUM LACTATE, POTASSIUM CHLORIDE, CALCIUM CHLORIDE 600; 310; 30; 20 MG/100ML; MG/100ML; MG/100ML; MG/100ML
INJECTION, SOLUTION INTRAVENOUS CONTINUOUS
Status: DISCONTINUED | OUTPATIENT
Start: 2024-04-29 | End: 2024-04-29 | Stop reason: HOSPADM

## 2024-04-29 RX ORDER — DEXAMETHASONE SODIUM PHOSPHATE 4 MG/ML
INJECTION, SOLUTION INTRA-ARTICULAR; INTRALESIONAL; INTRAMUSCULAR; INTRAVENOUS; SOFT TISSUE
Status: DISCONTINUED | OUTPATIENT
Start: 2024-04-29 | End: 2024-04-29

## 2024-04-29 RX ORDER — LIDOCAINE HYDROCHLORIDE 20 MG/ML
INJECTION INTRAVENOUS
Status: DISCONTINUED | OUTPATIENT
Start: 2024-04-29 | End: 2024-04-29

## 2024-04-29 RX ORDER — PROCHLORPERAZINE EDISYLATE 5 MG/ML
5 INJECTION INTRAMUSCULAR; INTRAVENOUS ONCE
Status: COMPLETED | OUTPATIENT
Start: 2024-04-29 | End: 2024-04-29

## 2024-04-29 RX ORDER — MIDAZOLAM HYDROCHLORIDE 1 MG/ML
INJECTION INTRAMUSCULAR; INTRAVENOUS
Status: DISCONTINUED | OUTPATIENT
Start: 2024-04-29 | End: 2024-04-29

## 2024-04-29 RX ORDER — OXYCODONE HYDROCHLORIDE 5 MG/1
5 TABLET ORAL
Status: DISCONTINUED | OUTPATIENT
Start: 2024-04-29 | End: 2024-04-29 | Stop reason: HOSPADM

## 2024-04-29 RX ORDER — KETOROLAC TROMETHAMINE 30 MG/ML
INJECTION, SOLUTION INTRAMUSCULAR; INTRAVENOUS
Status: DISCONTINUED | OUTPATIENT
Start: 2024-04-29 | End: 2024-04-29

## 2024-04-29 RX ORDER — SODIUM CHLORIDE 0.9 % (FLUSH) 0.9 %
3 SYRINGE (ML) INJECTION
Status: DISCONTINUED | OUTPATIENT
Start: 2024-04-29 | End: 2024-04-29 | Stop reason: HOSPADM

## 2024-04-29 RX ORDER — CEFAZOLIN SODIUM 2 G/50ML
2 SOLUTION INTRAVENOUS
Status: DISCONTINUED | OUTPATIENT
Start: 2024-04-29 | End: 2024-04-29 | Stop reason: HOSPADM

## 2024-04-29 RX ORDER — SODIUM CHLORIDE, SODIUM LACTATE, POTASSIUM CHLORIDE, CALCIUM CHLORIDE 600; 310; 30; 20 MG/100ML; MG/100ML; MG/100ML; MG/100ML
INJECTION, SOLUTION INTRAVENOUS CONTINUOUS
Status: ACTIVE | OUTPATIENT
Start: 2024-04-29

## 2024-04-29 RX ORDER — ACETAMINOPHEN 10 MG/ML
INJECTION, SOLUTION INTRAVENOUS
Status: DISCONTINUED | OUTPATIENT
Start: 2024-04-29 | End: 2024-04-29

## 2024-04-29 RX ORDER — ONDANSETRON HYDROCHLORIDE 2 MG/ML
INJECTION, SOLUTION INTRAVENOUS
Status: DISCONTINUED | OUTPATIENT
Start: 2024-04-29 | End: 2024-04-29

## 2024-04-29 RX ORDER — LIDOCAINE HYDROCHLORIDE 10 MG/ML
1 INJECTION, SOLUTION EPIDURAL; INFILTRATION; INTRACAUDAL; PERINEURAL ONCE
Status: ACTIVE | OUTPATIENT
Start: 2024-04-29

## 2024-04-29 RX ORDER — IBUPROFEN 600 MG/1
600 TABLET ORAL ONCE
Status: COMPLETED | OUTPATIENT
Start: 2024-04-29 | End: 2024-04-29

## 2024-04-29 RX ORDER — CEFAZOLIN SODIUM 1 G/3ML
INJECTION, POWDER, FOR SOLUTION INTRAMUSCULAR; INTRAVENOUS
Status: DISCONTINUED | OUTPATIENT
Start: 2024-04-29 | End: 2024-04-29

## 2024-04-29 RX ORDER — ONDANSETRON HYDROCHLORIDE 2 MG/ML
4 INJECTION, SOLUTION INTRAVENOUS ONCE AS NEEDED
Status: COMPLETED | OUTPATIENT
Start: 2024-04-29 | End: 2024-04-29

## 2024-04-29 RX ORDER — MEPERIDINE HYDROCHLORIDE 50 MG/ML
12.5 INJECTION INTRAMUSCULAR; INTRAVENOUS; SUBCUTANEOUS ONCE AS NEEDED
Status: DISCONTINUED | OUTPATIENT
Start: 2024-04-29 | End: 2024-04-29 | Stop reason: HOSPADM

## 2024-04-29 RX ORDER — OXYCODONE AND ACETAMINOPHEN 5; 325 MG/1; MG/1
1 TABLET ORAL EVERY 8 HOURS PRN
Qty: 12 TABLET | Refills: 0 | Status: SHIPPED | OUTPATIENT
Start: 2024-04-29 | End: 2024-05-22 | Stop reason: ALTCHOICE

## 2024-04-29 RX ORDER — IBUPROFEN 600 MG/1
600 TABLET ORAL EVERY 6 HOURS PRN
Qty: 20 TABLET | Refills: 1 | Status: SHIPPED | OUTPATIENT
Start: 2024-04-29 | End: 2025-04-29

## 2024-04-29 RX ORDER — HYDROMORPHONE HYDROCHLORIDE 2 MG/ML
0.2 INJECTION, SOLUTION INTRAMUSCULAR; INTRAVENOUS; SUBCUTANEOUS EVERY 5 MIN PRN
Status: DISCONTINUED | OUTPATIENT
Start: 2024-04-29 | End: 2024-04-29 | Stop reason: HOSPADM

## 2024-04-29 RX ORDER — LIDOCAINE HYDROCHLORIDE 10 MG/ML
1 INJECTION, SOLUTION EPIDURAL; INFILTRATION; INTRACAUDAL; PERINEURAL ONCE
Status: DISCONTINUED | OUTPATIENT
Start: 2024-04-29 | End: 2024-04-29 | Stop reason: HOSPADM

## 2024-04-29 RX ADMIN — KETOROLAC TROMETHAMINE 30 MG: 30 INJECTION, SOLUTION INTRAMUSCULAR; INTRAVENOUS at 07:04

## 2024-04-29 RX ADMIN — FENTANYL CITRATE 25 MCG: 50 INJECTION INTRAMUSCULAR; INTRAVENOUS at 07:04

## 2024-04-29 RX ADMIN — ONDANSETRON 4 MG: 2 INJECTION, SOLUTION INTRAMUSCULAR; INTRAVENOUS at 07:04

## 2024-04-29 RX ADMIN — DEXAMETHASONE SODIUM PHOSPHATE 8 MG: 4 INJECTION, SOLUTION INTRA-ARTICULAR; INTRALESIONAL; INTRAMUSCULAR; INTRAVENOUS; SOFT TISSUE at 07:04

## 2024-04-29 RX ADMIN — HYDROMORPHONE HYDROCHLORIDE 0.2 MG: 2 INJECTION, SOLUTION INTRAMUSCULAR; INTRAVENOUS; SUBCUTANEOUS at 08:04

## 2024-04-29 RX ADMIN — CEFAZOLIN 2 G: 330 INJECTION, POWDER, FOR SOLUTION INTRAMUSCULAR; INTRAVENOUS at 07:04

## 2024-04-29 RX ADMIN — ONDANSETRON 4 MG: 2 INJECTION INTRAMUSCULAR; INTRAVENOUS at 07:04

## 2024-04-29 RX ADMIN — HYDROMORPHONE HYDROCHLORIDE 0.2 MG: 2 INJECTION, SOLUTION INTRAMUSCULAR; INTRAVENOUS; SUBCUTANEOUS at 07:04

## 2024-04-29 RX ADMIN — SODIUM CHLORIDE, POTASSIUM CHLORIDE, SODIUM LACTATE AND CALCIUM CHLORIDE: 600; 310; 30; 20 INJECTION, SOLUTION INTRAVENOUS at 06:04

## 2024-04-29 RX ADMIN — PROPOFOL 200 MG: 10 INJECTION, EMULSION INTRAVENOUS at 07:04

## 2024-04-29 RX ADMIN — IBUPROFEN 600 MG: 600 TABLET, FILM COATED ORAL at 09:04

## 2024-04-29 RX ADMIN — SODIUM CHLORIDE, POTASSIUM CHLORIDE, SODIUM LACTATE AND CALCIUM CHLORIDE: 600; 310; 30; 20 INJECTION, SOLUTION INTRAVENOUS at 07:04

## 2024-04-29 RX ADMIN — PROCHLORPERAZINE EDISYLATE 5 MG: 5 INJECTION INTRAMUSCULAR; INTRAVENOUS at 08:04

## 2024-04-29 RX ADMIN — ACETAMINOPHEN 1000 MG: 10 INJECTION, SOLUTION INTRAVENOUS at 07:04

## 2024-04-29 RX ADMIN — GLYCOPYRROLATE 0.2 MG: 0.2 INJECTION, SOLUTION INTRAMUSCULAR; INTRAVITREAL at 07:04

## 2024-04-29 RX ADMIN — MIDAZOLAM HYDROCHLORIDE 2 MG: 1 INJECTION, SOLUTION INTRAMUSCULAR; INTRAVENOUS at 06:04

## 2024-04-29 RX ADMIN — LIDOCAINE HYDROCHLORIDE 100 MG: 20 INJECTION, SOLUTION INTRAVENOUS at 07:04

## 2024-04-29 RX ADMIN — OXYCODONE 5 MG: 5 TABLET ORAL at 07:04

## 2024-04-29 NOTE — BRIEF OP NOTE
April 29  Dx menorrhagia  Procedure dnc with hysteroscopy and ablation  Ebl min  Complications none  2 gms ancef  Pathology endometrium  mwiedemann

## 2024-04-29 NOTE — PLAN OF CARE
Patient oob and unable to urinate, request a motrin for pain, will continue fluids then discharge once able to urinate

## 2024-04-29 NOTE — ADDENDUM NOTE
Addendum  created 04/29/24 1027 by Sharri Madrigal, CRNA    Child order released for a procedure order, Clinical Note Signed, Intraprocedure Blocks edited, Intraprocedure Event edited, LDA created via procedure documentation, SmartForm saved

## 2024-04-29 NOTE — INTERVAL H&P NOTE
No change to hp  Had section x 2  Discussed ablation, failure rate and risks  Consents done  Pt wants to proceed

## 2024-04-29 NOTE — PLAN OF CARE
Patient transported to hospitals in stable condition, nausea resolved, pain 5/10 and tolerable, aaox4, vss.

## 2024-04-29 NOTE — ANESTHESIA PROCEDURE NOTES
Intubation    Date/Time: 4/29/2024 7:11 AM    Performed by: Sharri Madrigal CRNA  Authorized by: Miguel Graves Jr., MD    Intubation:     Induction:  Intravenous    Intubated:  Postinduction    Mask Ventilation:  Easy mask    Attempts:  1    Attempted By:  CRNA    Method of Intubation:  Other (see comments)    Blade:  Other (see comments)    Difficult Airway Encountered?: No      Complications:  None    Airway Device:  Supraglottic airway/LMA    Airway Device Size:  4.0    Secured at:  The lips    Placement Verified By:  Capnometry    Complicating Factors:  None    Findings Post-Intubation:  BS equal bilateral

## 2024-04-29 NOTE — ANESTHESIA POSTPROCEDURE EVALUATION
Anesthesia Post Evaluation    Patient: Corrine Fregosole    Procedure(s) Performed: Procedure(s) (LRB):  ABLATION, ENDOMETRIUM (N/A)    Final Anesthesia Type: general      Patient location during evaluation: PACU  Patient participation: Yes- Able to Participate  Level of consciousness: awake and alert  Post-procedure vital signs: reviewed and stable  Pain management: adequate  Airway patency: patent    PONV status at discharge: nausea (controlled)  Anesthetic complications: no      Cardiovascular status: stable  Respiratory status: spontaneous ventilation  Hydration status: euvolemic  Follow-up not needed.              Vitals Value Taken Time   /77 04/29/24 0826   Temp 36.2 °C (97.1 °F) 04/29/24 0751   Pulse 68 04/29/24 0831   Resp 37 04/29/24 0831   SpO2 93 % 04/29/24 0831   Vitals shown include unfiled device data.      Event Time   Out of Recovery 08:31:04         Pain/Philip Score: Pain Rating Prior to Med Admin: 7 (4/29/2024  7:56 AM)

## 2024-04-29 NOTE — PLAN OF CARE
Patient unable to urinate, bladder scan on 87ml, dr Wiedemann contacted and stated can be discharged at her request

## 2024-04-30 ENCOUNTER — TELEPHONE (OUTPATIENT)
Dept: OBSTETRICS AND GYNECOLOGY | Facility: CLINIC | Age: 39
End: 2024-04-30
Payer: COMMERCIAL

## 2024-04-30 NOTE — TELEPHONE ENCOUNTER
Called pt, voicemal  Message left  Surg went well  Fu  if any issues  If not, lmk how cycles are in few months

## 2024-04-30 NOTE — OP NOTE
April 29  Dx menorrhagia  Procedure dnc with hysteroscopy and ablation  Ebl min  Complications none  2 gms ancef  Pathology endometrium  Mwiedemann    The patient was placed under general anesthesia prepped and draped in lithotomy a sterile catheter was used to empty the bladder.  Bimanual exam was done which was normal the weighted speculum was placed and the cervix was progressively dilated.  Hysteroscopy showed no evidence of any abnormalities and therefore a NovaSure ablation was then used lasting 1 minute 15 seconds.  Post ablation hysteroscopy showed no evidence of injury or bleeding and what appeared to be global ablation.  A D&C was performed prior to the ablation and pathology was submitted.  There were no complications the patient was taken to recovery in good condition with minimal bleeding.

## 2024-05-01 ENCOUNTER — PATIENT MESSAGE (OUTPATIENT)
Dept: OBSTETRICS AND GYNECOLOGY | Facility: CLINIC | Age: 39
End: 2024-05-01
Payer: COMMERCIAL

## 2024-05-01 LAB
FINAL PATHOLOGIC DIAGNOSIS: NORMAL
GROSS: NORMAL
Lab: NORMAL

## 2024-05-22 ENCOUNTER — TELEPHONE (OUTPATIENT)
Dept: INTERNAL MEDICINE | Facility: CLINIC | Age: 39
End: 2024-05-22
Payer: COMMERCIAL

## 2024-05-22 ENCOUNTER — LAB VISIT (OUTPATIENT)
Dept: LAB | Facility: OTHER | Age: 39
End: 2024-05-22
Attending: STUDENT IN AN ORGANIZED HEALTH CARE EDUCATION/TRAINING PROGRAM
Payer: COMMERCIAL

## 2024-05-22 ENCOUNTER — PATIENT MESSAGE (OUTPATIENT)
Dept: INTERNAL MEDICINE | Facility: CLINIC | Age: 39
End: 2024-05-22

## 2024-05-22 ENCOUNTER — OFFICE VISIT (OUTPATIENT)
Dept: INTERNAL MEDICINE | Facility: CLINIC | Age: 39
End: 2024-05-22
Payer: COMMERCIAL

## 2024-05-22 VITALS
HEIGHT: 65 IN | DIASTOLIC BLOOD PRESSURE: 70 MMHG | OXYGEN SATURATION: 100 % | BODY MASS INDEX: 29.75 KG/M2 | WEIGHT: 178.56 LBS | HEART RATE: 78 BPM | SYSTOLIC BLOOD PRESSURE: 125 MMHG

## 2024-05-22 DIAGNOSIS — E66.09 CLASS 1 OBESITY DUE TO EXCESS CALORIES WITHOUT SERIOUS COMORBIDITY WITH BODY MASS INDEX (BMI) OF 31.0 TO 31.9 IN ADULT: Primary | ICD-10-CM

## 2024-05-22 DIAGNOSIS — F32.A DEPRESSION, UNSPECIFIED DEPRESSION TYPE: ICD-10-CM

## 2024-05-22 DIAGNOSIS — F90.2 ATTENTION DEFICIT HYPERACTIVITY DISORDER (ADHD), COMBINED TYPE: ICD-10-CM

## 2024-05-22 DIAGNOSIS — R25.2 LEG CRAMPS: ICD-10-CM

## 2024-05-22 DIAGNOSIS — R19.09 LUMP IN THE GROIN: ICD-10-CM

## 2024-05-22 DIAGNOSIS — F32.A DEPRESSION, UNSPECIFIED DEPRESSION TYPE: Primary | ICD-10-CM

## 2024-05-22 DIAGNOSIS — D50.0 IRON DEFICIENCY ANEMIA DUE TO CHRONIC BLOOD LOSS: ICD-10-CM

## 2024-05-22 LAB
ALBUMIN SERPL BCP-MCNC: 3.7 G/DL (ref 3.5–5.2)
ALP SERPL-CCNC: 76 U/L (ref 55–135)
ALT SERPL W/O P-5'-P-CCNC: 18 U/L (ref 10–44)
ANION GAP SERPL CALC-SCNC: 9 MMOL/L (ref 8–16)
AST SERPL-CCNC: 18 U/L (ref 10–40)
BASOPHILS # BLD AUTO: 0.04 K/UL (ref 0–0.2)
BASOPHILS NFR BLD: 0.5 % (ref 0–1.9)
BILIRUB SERPL-MCNC: 0.2 MG/DL (ref 0.1–1)
BUN SERPL-MCNC: 15 MG/DL (ref 6–20)
CALCIUM SERPL-MCNC: 9.3 MG/DL (ref 8.7–10.5)
CHLORIDE SERPL-SCNC: 107 MMOL/L (ref 95–110)
CO2 SERPL-SCNC: 22 MMOL/L (ref 23–29)
CREAT SERPL-MCNC: 0.7 MG/DL (ref 0.5–1.4)
DIFFERENTIAL METHOD BLD: ABNORMAL
EOSINOPHIL # BLD AUTO: 0.2 K/UL (ref 0–0.5)
EOSINOPHIL NFR BLD: 2.7 % (ref 0–8)
ERYTHROCYTE [DISTWIDTH] IN BLOOD BY AUTOMATED COUNT: 18.8 % (ref 11.5–14.5)
EST. GFR  (NO RACE VARIABLE): >60 ML/MIN/1.73 M^2
GLUCOSE SERPL-MCNC: 85 MG/DL (ref 70–110)
HCT VFR BLD AUTO: 26.9 % (ref 37–48.5)
HGB BLD-MCNC: 7.3 G/DL (ref 12–16)
IMM GRANULOCYTES # BLD AUTO: 0.03 K/UL (ref 0–0.04)
IMM GRANULOCYTES NFR BLD AUTO: 0.4 % (ref 0–0.5)
LYMPHOCYTES # BLD AUTO: 2.4 K/UL (ref 1–4.8)
LYMPHOCYTES NFR BLD: 30.7 % (ref 18–48)
MAGNESIUM SERPL-MCNC: 2 MG/DL (ref 1.6–2.6)
MCH RBC QN AUTO: 19.5 PG (ref 27–31)
MCHC RBC AUTO-ENTMCNC: 27.1 G/DL (ref 32–36)
MCV RBC AUTO: 72 FL (ref 82–98)
MONOCYTES # BLD AUTO: 0.8 K/UL (ref 0.3–1)
MONOCYTES NFR BLD: 9.5 % (ref 4–15)
NEUTROPHILS # BLD AUTO: 4.4 K/UL (ref 1.8–7.7)
NEUTROPHILS NFR BLD: 56.2 % (ref 38–73)
NRBC BLD-RTO: 0 /100 WBC
PLATELET # BLD AUTO: 590 K/UL (ref 150–450)
PMV BLD AUTO: 8.9 FL (ref 9.2–12.9)
POTASSIUM SERPL-SCNC: 3.8 MMOL/L (ref 3.5–5.1)
PROT SERPL-MCNC: 7.6 G/DL (ref 6–8.4)
RBC # BLD AUTO: 3.75 M/UL (ref 4–5.4)
SODIUM SERPL-SCNC: 138 MMOL/L (ref 136–145)
WBC # BLD AUTO: 7.88 K/UL (ref 3.9–12.7)

## 2024-05-22 PROCEDURE — 3008F BODY MASS INDEX DOCD: CPT | Mod: CPTII,S$GLB,, | Performed by: STUDENT IN AN ORGANIZED HEALTH CARE EDUCATION/TRAINING PROGRAM

## 2024-05-22 PROCEDURE — 80053 COMPREHEN METABOLIC PANEL: CPT | Performed by: STUDENT IN AN ORGANIZED HEALTH CARE EDUCATION/TRAINING PROGRAM

## 2024-05-22 PROCEDURE — 99999 PR PBB SHADOW E&M-EST. PATIENT-LVL IV: CPT | Mod: PBBFAC,,, | Performed by: STUDENT IN AN ORGANIZED HEALTH CARE EDUCATION/TRAINING PROGRAM

## 2024-05-22 PROCEDURE — 83735 ASSAY OF MAGNESIUM: CPT | Performed by: STUDENT IN AN ORGANIZED HEALTH CARE EDUCATION/TRAINING PROGRAM

## 2024-05-22 PROCEDURE — 99214 OFFICE O/P EST MOD 30 MIN: CPT | Mod: S$GLB,,, | Performed by: STUDENT IN AN ORGANIZED HEALTH CARE EDUCATION/TRAINING PROGRAM

## 2024-05-22 PROCEDURE — 85025 COMPLETE CBC W/AUTO DIFF WBC: CPT | Performed by: STUDENT IN AN ORGANIZED HEALTH CARE EDUCATION/TRAINING PROGRAM

## 2024-05-22 PROCEDURE — 3078F DIAST BP <80 MM HG: CPT | Mod: CPTII,S$GLB,, | Performed by: STUDENT IN AN ORGANIZED HEALTH CARE EDUCATION/TRAINING PROGRAM

## 2024-05-22 PROCEDURE — 3074F SYST BP LT 130 MM HG: CPT | Mod: CPTII,S$GLB,, | Performed by: STUDENT IN AN ORGANIZED HEALTH CARE EDUCATION/TRAINING PROGRAM

## 2024-05-22 PROCEDURE — 1159F MED LIST DOCD IN RCRD: CPT | Mod: CPTII,S$GLB,, | Performed by: STUDENT IN AN ORGANIZED HEALTH CARE EDUCATION/TRAINING PROGRAM

## 2024-05-22 PROCEDURE — 36415 COLL VENOUS BLD VENIPUNCTURE: CPT | Performed by: STUDENT IN AN ORGANIZED HEALTH CARE EDUCATION/TRAINING PROGRAM

## 2024-05-22 RX ORDER — BUPROPION HYDROCHLORIDE 150 MG/1
300 TABLET ORAL DAILY
Qty: 60 TABLET | Refills: 11 | Status: SHIPPED | OUTPATIENT
Start: 2024-05-22 | End: 2025-05-22

## 2024-05-22 RX ORDER — SEMAGLUTIDE 0.5 MG/.5ML
0.5 INJECTION, SOLUTION SUBCUTANEOUS
Qty: 2 ML | Refills: 11 | Status: SHIPPED | OUTPATIENT
Start: 2024-05-22 | End: 2025-05-22

## 2024-05-22 RX ORDER — BUPROPION HYDROCHLORIDE 300 MG/1
300 TABLET ORAL DAILY
Qty: 30 TABLET | Refills: 11 | Status: CANCELLED | OUTPATIENT
Start: 2024-05-22 | End: 2025-05-22

## 2024-05-22 RX ORDER — POTASSIUM CHLORIDE 750 MG/1
10 TABLET, EXTENDED RELEASE ORAL DAILY
Qty: 30 TABLET | Refills: 0 | Status: SHIPPED | OUTPATIENT
Start: 2024-05-22 | End: 2024-06-29

## 2024-05-22 NOTE — PROGRESS NOTES
"[facility]  Subjective:       Patient ID: Corrine Gan is a 39 y.o. female.  Chief Complaint:  Depression, ADHD, weight.  History was obtained from the patient and supplemented through chart review.    HPI:  Patient is a 39 y.o. female who presents for the above chief complaint.     Has been taking calexa for about the past 9 months. Intially when she started this she felt less "noisy" and more clear headed, but feels this effect has worn off.  Also reports she has a no sex drive.     Takes vyvanse in her car on the way to work. The effect of this has also worn off somewhat. By midshift her ADHD symtoms are uncontrolled. Has been on this current dose for about 18 months. Even co-workes have noticed that she is no longer able to focus properly.   She takes vyvanse mainly at work but also when off and needs to do house chores/ erranands on off days    Previously tried adderall but this made her anxious.  She would feel a stimulant effect follow up by a crash after which she would binge eat.  She finds the Vyvanse is a more gentle medicine that works more specifically to maintain focus without other adverse side effects of stimulants and does not result in a crash that leads to binge eating when the medication wears off likely Adderall would.     She works as a charge nurse in the ER.     Get sciatica pain every now and then. It is a pain that is in the inside of her hip. Was using tramadol. Would use 30 tables adrianna 3 months or so. Tries to avoid NSAIDs due to history of gastric sleeve.   Takes omeprazole daily     She was on Wegovy after she regained some of her weight compared to her postop weight.  This was effective and has helped her lose weight.  Her BMI was above 30 prior to initiating this medication.     Patient has a nodule in her right inguinal subcutaneous fat.  It is painful and bothersome.  She was interested in being further evaluated by General surgery.   Medical " History  Past Medical History:   Diagnosis Date    Coronary artery disease     large heart noted on CXR , No problems    Diabetes mellitus     previous and current pregnancy, diet controlled. Gestational DM only    Gestational diabetes     Hypertension     preEclampsia    Pregnancy     x 2; preecalmpsia for second one         Surgical hx, family hx, social hx   Family History   Problem Relation Name Age of Onset    Diabetes Mother Mom     Hypertension Father Cj     Diabetes Maternal Grandmother Darkene      Past Surgical History:   Procedure Laterality Date     SECTION      DILATION AND CURETTAGE OF UTERUS N/A 2024    Procedure: DILATION AND CURETTAGE, UTERUS;  Surgeon: Wiedemann, Michael A., MD;  Location: [facility] OR;  Service: OB/GYN;  Laterality: N/A;    ENDOMETRIAL ABLATION N/A 2024    Procedure: ABLATION, ENDOMETRIUM;  Surgeon: Wiedemann, Michael A., MD;  Location: [facility] OR;  Service: OB/GYN;  Laterality: N/A;    gastric sleeve      HYSTEROSCOPY N/A 2024    Procedure: HYSTEROSCOPY;  Surgeon: Wiedemann, Michael A., MD;  Location: [facility] OR;  Service: OB/GYN;  Laterality: N/A;    TONSILLECTOMY       Social History     Socioeconomic History    Marital status: Single   Tobacco Use    Smoking status: Every Day     Types: Vaping with nicotine    Smokeless tobacco: Never   Substance and Sexual Activity    Alcohol use: Yes     Comment: couple drinks daily    Drug use: No    Sexual activity: Yes     Social Determinants of Health     Financial Resource Strain: Patient Declined (2023)    Overall Financial Resource Strain (CARDIA)     Difficulty of Paying Living Expenses: Patient declined   Food Insecurity: Patient Declined (2023)    Hunger Vital Sign     Worried About Running Out of Food in the Last Year: Patient declined     Ran Out of Food in the Last Year: Patient declined   Transportation Needs: Patient Declined (2023)    PRAPARE - Transportation     Lack of Transportation  "(Medical): Patient declined     Lack of Transportation (Non-Medical): Patient declined   Physical Activity: Insufficiently Active (5/17/2023)    Exercise Vital Sign     Days of Exercise per Week: 3 days     Minutes of Exercise per Session: 30 min   Stress: Stress Concern Present (5/17/2023)    [employer] - Occupational Stress Questionnaire     Feeling of Stress : To some extent   Housing Stability: Unknown (5/17/2023)    Housing Stability Vital Sign     Unable to Pay for Housing in the Last Year: Patient refused     Unstable Housing in the Last Year: No     Immunization History   Administered Date(s) Administered    COVID-19, MRNA, LN-S, PF (Pfizer) (Purple Cap) 12/15/2020, 01/05/2021    Influenza 09/16/2013, 10/16/2018    Influenza - Quadrivalent - MDCK - PF 10/31/2022    Influenza - Quadrivalent - PF *Preferred* (6 months and older) 10/17/2016, 10/18/2017, 10/16/2018, 08/30/2019, 10/05/2020, 10/11/2021    Influenza - Trivalent (ADULT) 09/16/2013    Influenza A (H1N1) 2009 Monovalent - IM - PF 11/02/2009, 11/02/2009    Tdap 07/29/2017     Current Outpatient Medications   Medication Instructions    buPROPion (WELLBUTRIN XL) 300 mg, Oral, Daily    citalopram (CELEXA) 10 mg, Oral, Daily    ibuprofen (ADVIL,MOTRIN) 600 mg, Oral, Every 6 hours PRN    lisdexamfetamine (VYVANSE) 40 mg, Oral, Every morning, For ADD    nystatin (MYCOSTATIN) powder Topical (Top), 2 times daily    omeprazole (PRILOSEC) 20 mg, Oral, Daily    potassium chloride SA (K-DUR,KLOR-CON M) 10 MEQ tablet 10 mEq, Oral, Daily, Take with lasix daily    traMADoL (ULTRAM) 50 mg, Oral, Daily PRN, For foot pain    WEGOVY 0.5 mg, Subcutaneous, Every 7 days     Objective:      Body mass index is 29.72 kg/m².  Vitals:    05/22/24 1257   BP: 125/70   Pulse: 78   SpO2: 100%   Weight: 81 kg (178 lb 9.2 oz)   Height: 5' 5" (1.651 m)   PainSc:   5   PainLoc: Back     Physical Exam  Constitutional:       Appearance: Normal appearance. "   Cardiovascular:      Rate and Rhythm: Normal rate.      Heart sounds: No murmur heard.  Pulmonary:      Effort: Pulmonary effort is normal. No respiratory distress.   Abdominal:      General: Abdomen is flat.          Comments: Nontender palpable nodule that correlates with mass seen on CT scan.    Musculoskeletal:      Right lower leg: No edema.      Left lower leg: No edema.   Neurological:      Mental Status: She is alert.   Psychiatric:         Mood and Affect: Mood normal.           Lab Results   Component Value Date    WBC 7.88 05/22/2024    HGB 7.3 (L) 05/22/2024    HCT 26.9 (L) 05/22/2024     (H) 05/22/2024    CHOL 122 08/18/2023    TRIG 28 (L) 08/18/2023    HDL 51 08/18/2023    ALT 18 05/22/2024    AST 18 05/22/2024     05/22/2024    K 3.8 05/22/2024     05/22/2024    CREATININE 0.7 05/22/2024    BUN 15 05/22/2024    CO2 22 (L) 05/22/2024    TSH 1.143 08/18/2023    INR 0.9 07/31/2017    HGBA1C 5.4 04/28/2022       The ASCVD Risk score (Harrison DK, et al., 2019) failed to calculate for the following reasons:    The 2019 ASCVD risk score is only valid for ages 40 to 79  Assessment:       1. Class 1 obesity due to excess calories without serious comorbidity with body mass index (BMI) of 31.0 to 31.9 in adult    2. Leg cramps    3. Depression, unspecified depression type    4. Attention deficit hyperactivity disorder (ADHD), combined type    5. Lump in the groin          Plan:   Switch from Celexa to Wellbutrin for better treatment of depression with comorbid ADHD due to the fact that Celexa has a given her sexual side effects and patient is no longer getting benefit from Celexa.    No electrolyte abnormalities which would explain her leg cramps.  Possible that severe anemia is contributing.  She was status post endometrial ablation for menorrhagia. Advised iron supplementation.    Follow up in 2-3 weeks for virtual to assess response to Wellbutrin.     Class 1 obesity due to excess calories  without serious comorbidity with body mass index (BMI) of 31.0 to 31.9 in adult  -     semaglutide, weight loss, (WEGOVY) 0.5 mg/0.5 mL PnIj; Inject 0.5 mg into the skin every 7 days.  Dispense: 2 mL; Refill: 11    Leg cramps  -     potassium chloride SA (K-DUR,KLOR-CON M) 10 MEQ tablet; Take 1 tablet (10 mEq total) by mouth once daily. Take with lasix daily  Dispense: 30 tablet; Refill: 0  -     CBC W/ AUTO DIFFERENTIAL; Future; Expected date: 05/22/2024  -     COMPREHENSIVE METABOLIC PANEL; Future; Expected date: 05/22/2024  -     Magnesium; Future; Expected date: 05/22/2024    Depression, unspecified depression type  -     buPROPion (WELLBUTRIN XL) 150 MG TB24 tablet; Take 2 tablets (300 mg total) by mouth once daily.  Dispense: 60 tablet; Refill: 11    Attention deficit hyperactivity disorder (ADHD), combined type  -     buPROPion (WELLBUTRIN XL) 150 MG TB24 tablet; Take 2 tablets (300 mg total) by mouth once daily.  Dispense: 60 tablet; Refill: 11    Lump in the groin  -     US Soft Tissue, Groin Right; Future; Expected date: 05/22/2024  -     Ambulatory referral/consult to General Surgery; Future; Expected date: 05/29/2024        Health Maintenance    Tests to Keep You Healthy    Cervical Cancer Screening: Met on 9/19/2023  Tobacco Cessation: NO    No follow-ups on file. or sooner sergio Willoughby  [facility]  [facility] [address] [address]  Phone [phone]  Fax [phone]    This note is dictated using the M*Modal Fluency Direct word recognition program. It may contain word recognition mistakes or wrong word substitutions (commonly he/she and is/was substitutions) that were missed on review.

## 2024-05-23 ENCOUNTER — TELEPHONE (OUTPATIENT)
Dept: ORTHOPEDICS | Facility: CLINIC | Age: 39
End: 2024-05-23
Payer: COMMERCIAL

## 2024-05-24 ENCOUNTER — TELEPHONE (OUTPATIENT)
Dept: ORTHOPEDICS | Facility: CLINIC | Age: 39
End: 2024-05-24
Payer: COMMERCIAL

## 2024-05-29 NOTE — TELEPHONE ENCOUNTER
Called patient as second attempt to review what I sent in message  (2-3 week Virtual Visit needed)    Left a voicemail    I set a recall

## 2024-06-27 ENCOUNTER — PATIENT MESSAGE (OUTPATIENT)
Dept: INTERNAL MEDICINE | Facility: CLINIC | Age: 39
End: 2024-06-27
Payer: COMMERCIAL

## 2024-06-27 DIAGNOSIS — M54.32 BILATERAL SCIATICA: Primary | ICD-10-CM

## 2024-06-27 DIAGNOSIS — M54.31 BILATERAL SCIATICA: Primary | ICD-10-CM

## 2024-06-27 DIAGNOSIS — R25.2 LEG CRAMPS: ICD-10-CM

## 2024-06-27 RX ORDER — AMOXICILLIN 500 MG/1
500 CAPSULE ORAL EVERY 12 HOURS
Qty: 20 CAPSULE | Refills: 0 | Status: SHIPPED | OUTPATIENT
Start: 2024-06-27 | End: 2024-07-07

## 2024-06-27 NOTE — TELEPHONE ENCOUNTER
No care due was identified.  [facility] Embedded Care Due Messages. Reference number: [id number].   6/27/2024 5:31:15 PM CDT

## 2024-06-27 NOTE — TELEPHONE ENCOUNTER
Care Due:                  Date            Visit Type   Department     Provider  --------------------------------------------------------------------------------                                             [facility]  Last Visit: 05-      [facility]       Wilbert Willoughby  Next Visit: None Scheduled  None         None Found                                                            Last  Test          Frequency    Reason                     Performed    Due Date  --------------------------------------------------------------------------------    HBA1C.......  6 months...  semaglutide,.............  04-   10-    [facility] Embedded Care Due Messages. Reference number: [id number].   6/27/2024 5:30:13 PM CDT

## 2024-06-27 NOTE — TELEPHONE ENCOUNTER
No care due was identified.  [facility] Embedded Care Due Messages. Reference number: [id number].   6/27/2024 5:30:42 PM CDT

## 2024-06-27 NOTE — TELEPHONE ENCOUNTER
Refill Routing Note   Medication(s) are not appropriate for processing by [facility] for the following reason(s):        Outside of protocol    [facility] action(s):  Route               Appointments  past 12m or future 3m with PCP    Date Provider   Last Visit   5/22/2024 Wilbert Willoughby MD   Next Visit   Visit date not found Wilbert Willoughby MD   ED visits in past 90 days: 0        Note composed:5:31 PM 06/27/2024

## 2024-06-28 RX ORDER — POTASSIUM CHLORIDE 750 MG/1
10 TABLET, EXTENDED RELEASE ORAL DAILY
Qty: 30 TABLET | Refills: 5 | Status: SHIPPED | OUTPATIENT
Start: 2024-06-28 | End: 2024-12-25

## 2024-06-28 RX ORDER — LISDEXAMFETAMINE DIMESYLATE 40 MG/1
40 CAPSULE ORAL EVERY MORNING
Qty: 90 CAPSULE | Refills: 0 | Status: SHIPPED | OUTPATIENT
Start: 2024-06-28

## 2024-06-28 RX ORDER — TRAMADOL HYDROCHLORIDE 50 MG/1
50 TABLET ORAL DAILY PRN
Qty: 30 EACH | Refills: 0 | Status: SHIPPED | OUTPATIENT
Start: 2024-06-28

## 2024-07-08 ENCOUNTER — TELEPHONE (OUTPATIENT)
Dept: ORTHOPEDICS | Facility: CLINIC | Age: 39
End: 2024-07-08
Payer: COMMERCIAL

## 2024-07-09 ENCOUNTER — PATIENT MESSAGE (OUTPATIENT)
Dept: INTERNAL MEDICINE | Facility: CLINIC | Age: 39
End: 2024-07-09
Payer: COMMERCIAL

## 2024-07-09 DIAGNOSIS — B37.0 ORAL THRUSH: ICD-10-CM

## 2024-07-09 DIAGNOSIS — F32.A DEPRESSION, UNSPECIFIED DEPRESSION TYPE: Primary | ICD-10-CM

## 2024-07-09 RX ORDER — BUPROPION HYDROCHLORIDE 300 MG/1
300 TABLET ORAL DAILY
Qty: 30 TABLET | Refills: 11 | Status: SHIPPED | OUTPATIENT
Start: 2024-07-09 | End: 2025-07-09

## 2024-07-09 RX ORDER — CLOTRIMAZOLE 10 MG/1
LOZENGE ORAL; TOPICAL
Qty: 35 TABLET | Refills: 0 | Status: SHIPPED | OUTPATIENT
Start: 2024-07-09

## 2024-07-09 NOTE — TELEPHONE ENCOUNTER
No care due was identified.  [facility] Embedded Care Due Messages. Reference number: [id number].   7/09/2024 9:58:16 AM CDT

## 2024-07-10 ENCOUNTER — LAB VISIT (OUTPATIENT)
Dept: LAB | Facility: HOSPITAL | Age: 39
End: 2024-07-10
Attending: STUDENT IN AN ORGANIZED HEALTH CARE EDUCATION/TRAINING PROGRAM
Payer: COMMERCIAL

## 2024-07-10 DIAGNOSIS — B37.0 ORAL THRUSH: ICD-10-CM

## 2024-07-10 LAB — HIV 1+2 AB+HIV1 P24 AG SERPL QL IA: NORMAL

## 2024-07-10 PROCEDURE — 36415 COLL VENOUS BLD VENIPUNCTURE: CPT | Performed by: STUDENT IN AN ORGANIZED HEALTH CARE EDUCATION/TRAINING PROGRAM

## 2024-07-10 PROCEDURE — 87389 HIV-1 AG W/HIV-1&-2 AB AG IA: CPT | Performed by: STUDENT IN AN ORGANIZED HEALTH CARE EDUCATION/TRAINING PROGRAM

## 2024-07-17 RX ORDER — CITALOPRAM 10 MG/1
10 TABLET ORAL DAILY
Qty: 90 TABLET | Refills: 3 | Status: SHIPPED | OUTPATIENT
Start: 2024-07-17

## 2024-07-17 NOTE — TELEPHONE ENCOUNTER
Refill Routing Note   Medication(s) are not appropriate for processing by [facility] for the following reason(s):        No active prescription written by provider    [facility] action(s):  Defer               Appointments  past 12m or future 3m with PCP    Date Provider   Last Visit   5/22/2024 Wilbert Willoughby MD   Next Visit   Visit date not found Wilbert Willoughby MD   ED visits in past 90 days: 0        Note composed:3:53 AM 07/17/2024

## 2024-07-17 NOTE — TELEPHONE ENCOUNTER
No care due was identified.  [facility] Embedded Care Due Messages. Reference number: [id number].   7/17/2024 3:35:46 AM CDT

## 2024-07-21 DIAGNOSIS — F32.A DEPRESSION, UNSPECIFIED DEPRESSION TYPE: ICD-10-CM

## 2024-07-21 NOTE — TELEPHONE ENCOUNTER
No care due was identified.  [facility] Embedded Care Due Messages. Reference number: [id number].   7/21/2024 12:45:46 AM CDT

## 2024-07-22 RX ORDER — BUPROPION HYDROCHLORIDE 300 MG/1
300 TABLET ORAL DAILY
Qty: 90 TABLET | Refills: 3 | Status: SHIPPED | OUTPATIENT
Start: 2024-07-22 | End: 2025-07-22

## 2024-07-22 NOTE — TELEPHONE ENCOUNTER
Refill Routing Note   Medication(s) are not appropriate for processing by [facility] for the following reason(s):        New or recently adjusted medication    [facility] action(s):  Defer             Appointments  past 12m or future 3m with PCP    Date Provider   Last Visit   5/22/2024 Wilbert Willoughby MD   Next Visit   Visit date not found Wilbert Willoughby MD   ED visits in past 90 days: 0        Note composed:7:56 AM 07/22/2024

## 2024-08-13 ENCOUNTER — HOSPITAL ENCOUNTER (OUTPATIENT)
Dept: RADIOLOGY | Facility: OTHER | Age: 39
Discharge: HOME | End: 2024-08-13
Attending: STUDENT IN AN ORGANIZED HEALTH CARE EDUCATION/TRAINING PROGRAM
Payer: COMMERCIAL

## 2024-08-13 DIAGNOSIS — R19.09 LUMP IN THE GROIN: ICD-10-CM

## 2024-08-13 PROCEDURE — 76882 US LMTD JT/FCL EVL NVASC XTR: CPT | Mod: TC,RT

## 2024-08-13 PROCEDURE — 76882 US LMTD JT/FCL EVL NVASC XTR: CPT | Mod: 26,RT,, | Performed by: RADIOLOGY

## 2024-08-20 ENCOUNTER — PATIENT MESSAGE (OUTPATIENT)
Dept: INTERNAL MEDICINE | Facility: CLINIC | Age: 39
End: 2024-08-20
Payer: COMMERCIAL

## 2024-08-25 ENCOUNTER — PATIENT MESSAGE (OUTPATIENT)
Dept: INTERNAL MEDICINE | Facility: CLINIC | Age: 39
End: 2024-08-25
Payer: COMMERCIAL

## 2024-08-26 ENCOUNTER — LAB VISIT (OUTPATIENT)
Dept: LAB | Facility: HOSPITAL | Age: 39
End: 2024-08-26
Attending: STUDENT IN AN ORGANIZED HEALTH CARE EDUCATION/TRAINING PROGRAM
Payer: COMMERCIAL

## 2024-08-26 ENCOUNTER — TELEPHONE (OUTPATIENT)
Dept: EMERGENCY MEDICINE | Facility: HOSPITAL | Age: 39
End: 2024-08-26
Payer: COMMERCIAL

## 2024-08-26 DIAGNOSIS — B37.0 THRUSH: ICD-10-CM

## 2024-08-26 DIAGNOSIS — B37.0 THRUSH: Primary | ICD-10-CM

## 2024-08-26 PROCEDURE — 87070 CULTURE OTHR SPECIMN AEROBIC: CPT | Performed by: STUDENT IN AN ORGANIZED HEALTH CARE EDUCATION/TRAINING PROGRAM

## 2024-08-26 RX ORDER — NYSTATIN 100000 [USP'U]/ML
500000 SUSPENSION ORAL 4 TIMES DAILY
Qty: 200 ML | Refills: 0 | Status: SHIPPED | OUTPATIENT
Start: 2024-08-26 | End: 2024-09-05

## 2024-08-28 LAB — BACTERIA THROAT CULT: NORMAL

## 2024-10-03 RX ORDER — OMEPRAZOLE 20 MG/1
20 CAPSULE, DELAYED RELEASE ORAL DAILY
Qty: 90 CAPSULE | Refills: 2 | Status: SHIPPED | OUTPATIENT
Start: 2024-10-03 | End: 2025-06-30

## 2024-10-03 NOTE — TELEPHONE ENCOUNTER
Care Due:                  Date            Visit Type   Department     Provider  --------------------------------------------------------------------------------                                             [facility]  Last Visit: 05-      [facility]       Wilbert Willoughby  Next Visit: None Scheduled  None         None Found                                                            Last  Test          Frequency    Reason                     Performed    Due Date  --------------------------------------------------------------------------------    HBA1C.......  6 months...  semaglutide,.............  04-   10-    [facility] Embedded Care Due Messages. Reference number: [id number].   10/03/2024 8:04:17 AM CDT

## 2024-10-03 NOTE — TELEPHONE ENCOUNTER
Provider Staff:  Action required for this patient    Requires appointment   Requires labs      Please see care gap opportunities below in Care Due Message.    Thanks!  [facility]     Appointments      Date Provider   Last Visit   5/22/2024 Wilbert Willoughby MD   Next Visit   Visit date not found Wilbert Willoughby MD     Refill Decision Note   Corrine Gan  is requesting a refill authorization.  Brief Assessment and Rationale for Refill:  Approve     Medication Therapy Plan:        Comments:     Note composed:3:07 PM 10/03/2024

## 2024-11-07 ENCOUNTER — OFFICE VISIT (OUTPATIENT)
Dept: INTERNAL MEDICINE | Facility: CLINIC | Age: 39
End: 2024-11-07
Payer: COMMERCIAL

## 2024-11-07 ENCOUNTER — LAB VISIT (OUTPATIENT)
Dept: LAB | Facility: OTHER | Age: 39
End: 2024-11-07
Attending: STUDENT IN AN ORGANIZED HEALTH CARE EDUCATION/TRAINING PROGRAM
Payer: COMMERCIAL

## 2024-11-07 VITALS
HEART RATE: 72 BPM | DIASTOLIC BLOOD PRESSURE: 63 MMHG | HEIGHT: 65 IN | SYSTOLIC BLOOD PRESSURE: 133 MMHG | WEIGHT: 173.94 LBS | BODY MASS INDEX: 28.98 KG/M2 | OXYGEN SATURATION: 98 %

## 2024-11-07 DIAGNOSIS — B37.0 ORAL THRUSH: Primary | ICD-10-CM

## 2024-11-07 DIAGNOSIS — F90.2 ATTENTION DEFICIT HYPERACTIVITY DISORDER (ADHD), COMBINED TYPE: ICD-10-CM

## 2024-11-07 DIAGNOSIS — M25.50 ARTHRALGIA, UNSPECIFIED JOINT: ICD-10-CM

## 2024-11-07 DIAGNOSIS — K21.9 GASTROESOPHAGEAL REFLUX DISEASE, UNSPECIFIED WHETHER ESOPHAGITIS PRESENT: ICD-10-CM

## 2024-11-07 DIAGNOSIS — L52 ERYTHEMA NODOSUM: ICD-10-CM

## 2024-11-07 DIAGNOSIS — B37.81 ESOPHAGEAL CANDIDIASIS: ICD-10-CM

## 2024-11-07 DIAGNOSIS — B37.0 ORAL THRUSH: ICD-10-CM

## 2024-11-07 DIAGNOSIS — M54.32 BILATERAL SCIATICA: ICD-10-CM

## 2024-11-07 DIAGNOSIS — M54.31 BILATERAL SCIATICA: ICD-10-CM

## 2024-11-07 LAB
ALBUMIN SERPL BCP-MCNC: 3.9 G/DL (ref 3.5–5.2)
ALP SERPL-CCNC: 78 U/L (ref 40–150)
ALT SERPL W/O P-5'-P-CCNC: 26 U/L (ref 10–44)
ANION GAP SERPL CALC-SCNC: 8 MMOL/L (ref 8–16)
AST SERPL-CCNC: 22 U/L (ref 10–40)
BASOPHILS # BLD AUTO: 0.05 K/UL (ref 0–0.2)
BASOPHILS NFR BLD: 0.8 % (ref 0–1.9)
BILIRUB SERPL-MCNC: 0.3 MG/DL (ref 0.1–1)
BUN SERPL-MCNC: 21 MG/DL (ref 6–20)
CALCIUM SERPL-MCNC: 9.6 MG/DL (ref 8.7–10.5)
CCP AB SER IA-ACNC: 0.9 U/ML
CHLORIDE SERPL-SCNC: 106 MMOL/L (ref 95–110)
CO2 SERPL-SCNC: 24 MMOL/L (ref 23–29)
CREAT SERPL-MCNC: 0.8 MG/DL (ref 0.5–1.4)
CRP SERPL-MCNC: 1.5 MG/L (ref 0–8.2)
DIFFERENTIAL METHOD BLD: ABNORMAL
EOSINOPHIL # BLD AUTO: 0.2 K/UL (ref 0–0.5)
EOSINOPHIL NFR BLD: 2.3 % (ref 0–8)
ERYTHROCYTE [DISTWIDTH] IN BLOOD BY AUTOMATED COUNT: 18.9 % (ref 11.5–14.5)
ERYTHROCYTE [SEDIMENTATION RATE] IN BLOOD BY PHOTOMETRIC METHOD: 66 MM/HR (ref 0–36)
EST. GFR  (NO RACE VARIABLE): >60 ML/MIN/1.73 M^2
GLUCOSE SERPL-MCNC: 82 MG/DL (ref 70–110)
HCT VFR BLD AUTO: 27.2 % (ref 37–48.5)
HCV AB SERPL QL IA: NEGATIVE
HGB BLD-MCNC: 7.5 G/DL (ref 12–16)
IMM GRANULOCYTES # BLD AUTO: 0.02 K/UL (ref 0–0.04)
IMM GRANULOCYTES NFR BLD AUTO: 0.3 % (ref 0–0.5)
LYMPHOCYTES # BLD AUTO: 2 K/UL (ref 1–4.8)
LYMPHOCYTES NFR BLD: 30.8 % (ref 18–48)
MCH RBC QN AUTO: 19.8 PG (ref 27–31)
MCHC RBC AUTO-ENTMCNC: 27.6 G/DL (ref 32–36)
MCV RBC AUTO: 72 FL (ref 82–98)
MONOCYTES # BLD AUTO: 0.6 K/UL (ref 0.3–1)
MONOCYTES NFR BLD: 9.8 % (ref 4–15)
NEUTROPHILS # BLD AUTO: 3.7 K/UL (ref 1.8–7.7)
NEUTROPHILS NFR BLD: 56 % (ref 38–73)
NRBC BLD-RTO: 0 /100 WBC
PLATELET # BLD AUTO: 674 K/UL (ref 150–450)
PMV BLD AUTO: 8.6 FL (ref 9.2–12.9)
POTASSIUM SERPL-SCNC: 3.7 MMOL/L (ref 3.5–5.1)
PROT SERPL-MCNC: 7.7 G/DL (ref 6–8.4)
RBC # BLD AUTO: 3.78 M/UL (ref 4–5.4)
RHEUMATOID FACT SERPL-ACNC: <13 IU/ML (ref 0–15)
SODIUM SERPL-SCNC: 138 MMOL/L (ref 136–145)
WBC # BLD AUTO: 6.53 K/UL (ref 3.9–12.7)

## 2024-11-07 PROCEDURE — 36415 COLL VENOUS BLD VENIPUNCTURE: CPT | Performed by: STUDENT IN AN ORGANIZED HEALTH CARE EDUCATION/TRAINING PROGRAM

## 2024-11-07 PROCEDURE — 86200 CCP ANTIBODY: CPT | Performed by: STUDENT IN AN ORGANIZED HEALTH CARE EDUCATION/TRAINING PROGRAM

## 2024-11-07 PROCEDURE — 99214 OFFICE O/P EST MOD 30 MIN: CPT | Mod: S$GLB,,, | Performed by: STUDENT IN AN ORGANIZED HEALTH CARE EDUCATION/TRAINING PROGRAM

## 2024-11-07 PROCEDURE — 85652 RBC SED RATE AUTOMATED: CPT | Performed by: STUDENT IN AN ORGANIZED HEALTH CARE EDUCATION/TRAINING PROGRAM

## 2024-11-07 PROCEDURE — 85025 COMPLETE CBC W/AUTO DIFF WBC: CPT | Performed by: STUDENT IN AN ORGANIZED HEALTH CARE EDUCATION/TRAINING PROGRAM

## 2024-11-07 PROCEDURE — 86431 RHEUMATOID FACTOR QUANT: CPT | Performed by: STUDENT IN AN ORGANIZED HEALTH CARE EDUCATION/TRAINING PROGRAM

## 2024-11-07 PROCEDURE — 86225 DNA ANTIBODY NATIVE: CPT | Mod: 59 | Performed by: STUDENT IN AN ORGANIZED HEALTH CARE EDUCATION/TRAINING PROGRAM

## 2024-11-07 PROCEDURE — 80053 COMPREHEN METABOLIC PANEL: CPT | Performed by: STUDENT IN AN ORGANIZED HEALTH CARE EDUCATION/TRAINING PROGRAM

## 2024-11-07 PROCEDURE — 3075F SYST BP GE 130 - 139MM HG: CPT | Mod: CPTII,S$GLB,, | Performed by: STUDENT IN AN ORGANIZED HEALTH CARE EDUCATION/TRAINING PROGRAM

## 2024-11-07 PROCEDURE — 86803 HEPATITIS C AB TEST: CPT | Performed by: STUDENT IN AN ORGANIZED HEALTH CARE EDUCATION/TRAINING PROGRAM

## 2024-11-07 PROCEDURE — 3008F BODY MASS INDEX DOCD: CPT | Mod: CPTII,S$GLB,, | Performed by: STUDENT IN AN ORGANIZED HEALTH CARE EDUCATION/TRAINING PROGRAM

## 2024-11-07 PROCEDURE — 99999 PR PBB SHADOW E&M-EST. PATIENT-LVL IV: CPT | Mod: PBBFAC,,, | Performed by: STUDENT IN AN ORGANIZED HEALTH CARE EDUCATION/TRAINING PROGRAM

## 2024-11-07 PROCEDURE — 86140 C-REACTIVE PROTEIN: CPT | Performed by: STUDENT IN AN ORGANIZED HEALTH CARE EDUCATION/TRAINING PROGRAM

## 2024-11-07 PROCEDURE — 86235 NUCLEAR ANTIGEN ANTIBODY: CPT | Performed by: STUDENT IN AN ORGANIZED HEALTH CARE EDUCATION/TRAINING PROGRAM

## 2024-11-07 PROCEDURE — 1159F MED LIST DOCD IN RCRD: CPT | Mod: CPTII,S$GLB,, | Performed by: STUDENT IN AN ORGANIZED HEALTH CARE EDUCATION/TRAINING PROGRAM

## 2024-11-07 PROCEDURE — 3078F DIAST BP <80 MM HG: CPT | Mod: CPTII,S$GLB,, | Performed by: STUDENT IN AN ORGANIZED HEALTH CARE EDUCATION/TRAINING PROGRAM

## 2024-11-07 PROCEDURE — 86060 ANTISTREPTOLYSIN O TITER: CPT | Performed by: STUDENT IN AN ORGANIZED HEALTH CARE EDUCATION/TRAINING PROGRAM

## 2024-11-07 PROCEDURE — 86038 ANTINUCLEAR ANTIBODIES: CPT | Performed by: STUDENT IN AN ORGANIZED HEALTH CARE EDUCATION/TRAINING PROGRAM

## 2024-11-07 PROCEDURE — 86225 DNA ANTIBODY NATIVE: CPT | Performed by: STUDENT IN AN ORGANIZED HEALTH CARE EDUCATION/TRAINING PROGRAM

## 2024-11-07 RX ORDER — LISDEXAMFETAMINE DIMESYLATE 50 MG/1
CAPSULE ORAL
Qty: 30 CAPSULE | Refills: 0 | Status: SHIPPED | OUTPATIENT
Start: 2024-11-07

## 2024-11-07 RX ORDER — LISDEXAMFETAMINE DIMESYLATE 50 MG/1
CAPSULE ORAL
Qty: 30 CAPSULE | Refills: 0 | Status: SHIPPED | OUTPATIENT
Start: 2024-11-07 | End: 2024-11-07

## 2024-11-07 RX ORDER — PANTOPRAZOLE SODIUM 40 MG/1
40 TABLET, DELAYED RELEASE ORAL DAILY
Qty: 90 TABLET | Refills: 1 | Status: SHIPPED | OUTPATIENT
Start: 2024-11-07 | End: 2024-11-07

## 2024-11-07 RX ORDER — PANTOPRAZOLE SODIUM 40 MG/1
40 TABLET, DELAYED RELEASE ORAL DAILY
Qty: 90 TABLET | Refills: 1 | Status: SHIPPED | OUTPATIENT
Start: 2024-11-07 | End: 2025-05-06

## 2024-11-07 RX ORDER — NYSTATIN 100000 [USP'U]/G
POWDER TOPICAL 2 TIMES DAILY
Qty: 60 G | Refills: 11 | Status: SHIPPED | OUTPATIENT
Start: 2024-11-07

## 2024-11-07 NOTE — TELEPHONE ENCOUNTER
No care due was identified.  [facility] Embedded Care Due Messages. Reference number: [id number].   11/07/2024 10:24:11 AM CST

## 2024-11-07 NOTE — TELEPHONE ENCOUNTER
Faxed GI referral to the listed facility below:    Ambulatory referral/consult to Gastroenterology [[id number]] (Order [id number])  Outpatient Referral  Date and Time: 2024 10:13 AM Department: [facility] Internal Medicine Rel By/Authorizing: Wilbert Willoughby MD     Linked Results    Procedure Abnormality Status   Ambulatory referral/consult to Gastroenterology       Department    Name Address Phone Fax   [facility] [address] [id number] [phone]     Patient Demographics    Patient Name  Corrine Valdes Legal Sex  Female   1985 SSN  [id number] Address  [address] [address] Phone  [phone] (Home)  [phone] (Mobile) *Preferred*     Primary Visit Coverage    Payer Plan Sponsor Code Group Number Group Name   [facility] ALL OUT OF STATE  [id number]        Primary Visit Coverage Subscriber    Subscriber ID Subscriber Name Subscriber Address   [id number] NATALIE VALDES [address] [place] [id number]       Reason for Exam  Priority: Routine  Dx: Oral thrush [B37.0 (ICD-10-CM)]; Esophageal candidiasis [B37.81 (ICD-10-CM)]     Referral Details    Referred By   Referred To   Wilbert Willoughby MD  [address]  [address]  [facility] [address]  Phone: [phone]  Fax: [phone] Diagnoses: Oral thrush  Esophageal candidiasis  Order: Ambulatory referral/consult to Gastroenterology  Reason: Specialty Services Required Locations, [facility]  [address]  [address]  [facility] [address]  Phone: [phone]  Fax: [phone]          Future Order Information    Expected Expires      2024               Associated Diagnoses     ICD-10-CM ICD-9-CM   Oral thrush  - Primary    B37.0 112.0   Esophageal candidiasis    B37.81 112.84     Order Questions    Question Answer   Location is [place] or [place]: No   [facility] General GI/GI Follow Up   Note: This  "question is linked to a decision tree used for scheduling when applicable.            Physician Signature          Signature: ________________________________________________________ Date: _____________         Print Name: ________________________________________________________       Ambulatory referral/consult to Gastroenterology    Electronically signed by: Wilbert Willoughby MD on 11/07/24 1013 Status: Active   Ordering user: Wilbert Willoughby MD 11/07/24 1013 Ordering mode: Standard              Electronically signed by: Wilbert Willoughby MD Lic # [id number] NPI: [id number]       Your fax has been successfully sent to [phone] at [phone].  ------------------------------------------------------------  From: [id number]  ------------------------------------------------------------  11/7/2024 5:17:03 PM Transmission Record          Sent to [phone] with remote ID "[email or web address]"          Result: (0/339;0/0) Success          Page record: 1 - 3          Elapsed time: 01:20 on channel 50    "

## 2024-11-07 NOTE — TELEPHONE ENCOUNTER
Refill Routing Note   Medication(s) are not appropriate for processing by [facility] for the following reason(s):        Outside of protocol  No active prescription written by provider    [facility] action(s):  Route               Appointments  past 12m or future 3m with PCP    Date Provider   Last Visit   11/7/2024 Wilbert Willoughby MD   Next Visit   Visit date not found Wilbert Willouhgby MD   ED visits in past 90 days: 0        Note composed:12:48 PM 11/07/2024

## 2024-11-08 ENCOUNTER — PATIENT MESSAGE (OUTPATIENT)
Dept: INTERNAL MEDICINE | Facility: CLINIC | Age: 39
End: 2024-11-08
Payer: COMMERCIAL

## 2024-11-08 DIAGNOSIS — D64.9 SEVERE ANEMIA: Primary | ICD-10-CM

## 2024-11-08 LAB
ANA SER QL IF: NORMAL
ANTI-SSA ANTIBODY: 0.03 RATIO (ref 0–0.99)
ANTI-SSA INTERPRETATION: NEGATIVE
DNA TITER: 0
DSDNA AB SER-ACNC: NORMAL [IU]/ML

## 2024-11-08 RX ORDER — TRAMADOL HYDROCHLORIDE 50 MG/1
50 TABLET ORAL DAILY PRN
Qty: 30 EACH | Refills: 0 | Status: SHIPPED | OUTPATIENT
Start: 2024-11-08

## 2024-11-08 NOTE — PROGRESS NOTES
[facility]  Subjective:     Patient ID: Corrine Gan is a 39 y.o. female.  History of Present Illness    CHIEF COMPLAINT:  Patient presents with persistent oral thrush, throat swelling, and multiple systemic symptoms, seeking evaluation for a possible autoimmune condition.    HPI:  Patient reports oral thrush since June 26th, coinciding with an increase in Wellbutrin dosage. She has had significant oral pain for the past 3 months. On the previous Sunday, she developed throat swelling and hemoptysis. By Monday, concerned about her symptoms, she performed a home strep test, which was positive.    She consulted with an ER physician who suggested she might be a strep carrier or have a chronic condition. She was advised to see an urgent ENT, who performed a nasal scope and observed signs of GERD and possible thrush in her throat.    Patient reports multiple systemic symptoms including fatigue and difficulty sleeping. She has sciatica-like pain 2-3 times per week, sometimes affecting her hip areas and inner groin, causing her to ambulate with a limp. The pain is severe enough to be noticed by coworkers. Toradol injections are the only effective treatment for this pain.    She also notes alopecia and easy bruising. Pictures from August show extensive, spontaneous, and painful bruising on her legs. On June 29th, she had non-traumatic swelling and pain in 2 fingers, which resolved spontaneously. She also reports nasal scabs.    Patient describes dysphonia and dysphagia. She can only tolerate water, as anything with flavor, spice, or carbonation causes pain or regurgitation.    She discontinued Wellbutrin and her SSRI reporting improved energy since cessation. She is currently only taking Vyvanse (recently increased from 40mg to 50mg) and omeprazole for GERD, though she reports worsening GERD symptoms.    Patient had a negative HIV test and reports that her recent labs was normal except for a low  "blood count.   Patient denies having arthritis or lupus.    MEDICATIONS:  Patient is on Vyvanse 50 mg daily for ADHD, Omeprazole 20 mg daily for GERD, and Diflucan 200 mg daily for 2 weeks to treat oral thrush.    MEDICAL HISTORY:  Patient has a history of GERD and ADHD. She also has recurrent oral thrush, with the most recent occurrence on June 26. She experienced erythema nodosum in August.    SURGICAL HISTORY:  Patient underwent an ablation procedure due to low blood count.      Current Outpatient Medications   Medication Instructions    fluconazole (DIFLUCAN) 200 MG Tab Take 1 tablet by mouth daily    ibuprofen (ADVIL,MOTRIN) 600 mg, Oral, Every 6 hours PRN    LIDOcaine viscous HCl 2% (LIDOCAINE VISCOUS) 2 % Soln 15 mL as needed Mouth/Throat every 3 hrs As needed for pain    lisdexamfetamine (VYVANSE) 50 MG capsule TAKE 1 CAPSULE BY MOUTH ONCE DAILY IN THE MORNING.    nystatin (MYCOSTATIN) powder Topical (Top), 2 times daily    pantoprazole (PROTONIX) 40 mg, Oral, Daily    potassium chloride SA (K-DUR,KLOR-CON M) 10 MEQ tablet 10 mEq, Oral, Daily, Take with lasix daily    traMADoL (ULTRAM) 50 mg, Oral, Daily PRN, For foot pain     Objective:      Body mass index is 28.95 kg/m².  Vitals:    11/07/24 0939   BP: 133/63   Pulse: 72   SpO2: 98%   Weight: 78.9 kg (173 lb 15.1 oz)   Height: 5' 5" (1.651 m)   PainSc:   8   PainLoc: Back     Physical Exam   Physical Exam    General: No acute distress. Normal appearance.  Head: Normocephalic.  Cardiovascular: Normal rate and rhythm. No murmur heard.  Lungs: Pulmonary effort is normal. Normal breath sounds. No wheezing. No rales.  Abdomen: Flat.  Musculoskeletal: No edema lower extremities.  Skin: Warm. Dry.  Neurological: Alert.  Psychiatric: Normal mood. Normal behavior.       Assessment:       1. Oral thrush    2. Erythema nodosum    3. Arthralgia, unspecified joint    4. Esophageal candidiasis    5. Gastroesophageal reflux disease, unspecified whether esophagitis " present    6. Attention deficit hyperactivity disorder (ADHD), combined type        Plan:   Assessment & Plan    AUTOIMMUNE WORKUP:  - Considering autoimmune workup due to symptoms including oral thrush, erythema nodosum, fatigue, and joint pain.  - Evaluating for possible lupus or other autoimmune disorders pending lab results.  - Autoimmune workup labs ordered.  - Considering referral to rheumatology pending lab results.    ERYTHEMA NODOSUM:  - Suspected erythema nodosum, potentially associated with strep pharyngitis or autoimmune condition.  - Explained erythema nodosum as painful red nodules associated with inflammatory or autoimmune conditions.    GERD:  - Assessing GERD symptoms and considering stronger acid suppression therapy.  - Started Protonix b.i.d.  - for GERD symptoms.  - Referred to [facility] for endoscopy to evaluate GERD.    ADHD:  - Reviewing psychiatric medication history and ADHD management.  - Continued Vyvanse 50 mg daily for ADHD.  - Refilled Vyvanse 50 mg.    ENT REFERRAL:  - Referred to [facility] for throat biopsy.    FOLLOW UP:  - Contact the office once lab results are available for further management and potential rheumatology referral.  - Follow up with records from previous psychiatrist and ENT visits.         Oral thrush  -     CBC auto differential; Future; Expected date: 11/07/2024  -     Comprehensive metabolic panel; Future; Expected date: 11/07/2024  -     Sedimentation rate; Future; Expected date: 11/07/2024  -     C-reactive protein; Future; Expected date: 11/07/2024  -     CARLOS; Future; Expected date: 11/07/2024  -     Sjogrens syndrome-A extractable nuclear antibody; Future; Expected date: 11/07/2024  -     Hepatitis C antibody; Future; Expected date: 11/07/2024  -     Cyclic citrul peptide antibody, IgG; Future; Expected date: 11/07/2024  -     Rheumatoid factor; Future; Expected date: 11/07/2024  -     ANTI-DNA ANTIBODY, DOUBLE-STRANDED; Future; Expected date:  11/07/2024  -     Ambulatory referral/consult to Gastroenterology; Future; Expected date: 11/14/2024  -     Ambulatory referral/consult to ENT; Future; Expected date: 11/14/2024  -     STREPTOCOCCAL ANTIBODIES (ASO); Future; Expected date: 11/07/2024    Erythema nodosum  -     CBC auto differential; Future; Expected date: 11/07/2024  -     Comprehensive metabolic panel; Future; Expected date: 11/07/2024  -     Sedimentation rate; Future; Expected date: 11/07/2024  -     C-reactive protein; Future; Expected date: 11/07/2024  -     CARLOS; Future; Expected date: 11/07/2024  -     Sjogrens syndrome-A extractable nuclear antibody; Future; Expected date: 11/07/2024  -     Hepatitis C antibody; Future; Expected date: 11/07/2024  -     Cyclic citrul peptide antibody, IgG; Future; Expected date: 11/07/2024  -     Rheumatoid factor; Future; Expected date: 11/07/2024  -     ANTI-DNA ANTIBODY, DOUBLE-STRANDED; Future; Expected date: 11/07/2024  -     STREPTOCOCCAL ANTIBODIES (ASO); Future; Expected date: 11/07/2024    Arthralgia, unspecified joint  -     CBC auto differential; Future; Expected date: 11/07/2024  -     Comprehensive metabolic panel; Future; Expected date: 11/07/2024  -     Sedimentation rate; Future; Expected date: 11/07/2024  -     C-reactive protein; Future; Expected date: 11/07/2024  -     CARLOS; Future; Expected date: 11/07/2024  -     Sjogrens syndrome-A extractable nuclear antibody; Future; Expected date: 11/07/2024  -     Hepatitis C antibody; Future; Expected date: 11/07/2024  -     Cyclic citrul peptide antibody, IgG; Future; Expected date: 11/07/2024  -     Rheumatoid factor; Future; Expected date: 11/07/2024  -     ANTI-DNA ANTIBODY, DOUBLE-STRANDED; Future; Expected date: 11/07/2024    Esophageal candidiasis  -     Ambulatory referral/consult to Gastroenterology; Future; Expected date: 11/14/2024    Gastroesophageal reflux disease, unspecified whether esophagitis present  -     Discontinue: pantoprazole  (PROTONIX) 40 MG tablet; Take 1 tablet (40 mg total) by mouth once daily.  Dispense: 90 tablet; Refill: 1  -     pantoprazole (PROTONIX) 40 MG tablet; Take 1 tablet (40 mg total) by mouth once daily.  Dispense: 90 tablet; Refill: 1    Attention deficit hyperactivity disorder (ADHD), combined type  -     Discontinue: lisdexamfetamine (VYVANSE) 50 MG capsule; TAKE 1 CAPSULE BY MOUTH ONCE DAILY IN THE MORNING.  Dispense: 30 capsule; Refill: 0  -     lisdexamfetamine (VYVANSE) 50 MG capsule; TAKE 1 CAPSULE BY MOUTH ONCE DAILY IN THE MORNING.  Dispense: 30 capsule; Refill: 0        Tests to Keep You Healthy    Cervical Cancer Screening: Met on 9/19/2023  Tobacco Cessation: NO    No follow-ups on file. or sooner prn (as needed)          Wilbert Willoughby  [facility]  [address] [address]  Phone [phone]  Fax [phone]    Part of this note is dictated using the M*Modal Fluency Direct word recognition program. It may contain word recognition mistakes or wrong word substitutions (commonly he/she and is/was substitutions) that were missed on review.    Part of this note was generated with the assistance of ambient listening technology. Verbal consent was obtained by the patient and accompanying visitor(s) for the recording of patient appointment to facilitate this note. I attest to having reviewed and edited the generated note for accuracy, though some syntax or spelling errors may persist. Please contact the author of this note for any clarification.

## 2024-11-11 ENCOUNTER — PATIENT MESSAGE (OUTPATIENT)
Dept: INTERNAL MEDICINE | Facility: CLINIC | Age: 39
End: 2024-11-11
Payer: COMMERCIAL

## 2024-11-11 DIAGNOSIS — J02.0 STREP THROAT: Primary | ICD-10-CM

## 2024-11-11 DIAGNOSIS — B37.0 ORAL THRUSH: ICD-10-CM

## 2024-11-11 LAB — ASO AB SERPL-ACNC: 548 IU/ML

## 2024-11-11 RX ORDER — PENICILLIN V POTASSIUM 500 MG/1
500 TABLET, FILM COATED ORAL EVERY 8 HOURS
Qty: 30 TABLET | Refills: 0 | Status: SHIPPED | OUTPATIENT
Start: 2024-11-11 | End: 2024-11-21

## 2024-11-21 ENCOUNTER — OFFICE VISIT (OUTPATIENT)
Dept: OTOLARYNGOLOGY | Facility: CLINIC | Age: 39
End: 2024-11-21
Payer: COMMERCIAL

## 2024-11-21 ENCOUNTER — LAB VISIT (OUTPATIENT)
Dept: LAB | Facility: HOSPITAL | Age: 39
End: 2024-11-21
Attending: OTOLARYNGOLOGY
Payer: COMMERCIAL

## 2024-11-21 VITALS
HEART RATE: 83 BPM | DIASTOLIC BLOOD PRESSURE: 73 MMHG | HEIGHT: 65 IN | BODY MASS INDEX: 29.02 KG/M2 | WEIGHT: 174.19 LBS | SYSTOLIC BLOOD PRESSURE: 113 MMHG

## 2024-11-21 DIAGNOSIS — K14.0 GLOSSITIS: Chronic | ICD-10-CM

## 2024-11-21 DIAGNOSIS — B37.0 ORAL THRUSH: Chronic | ICD-10-CM

## 2024-11-21 DIAGNOSIS — D64.9 ANEMIA, UNSPECIFIED TYPE: ICD-10-CM

## 2024-11-21 DIAGNOSIS — Z90.3 H/O GASTRIC SLEEVE: ICD-10-CM

## 2024-11-21 DIAGNOSIS — K21.9 LARYNGOPHARYNGEAL REFLUX (LPR): ICD-10-CM

## 2024-11-21 DIAGNOSIS — B37.0 ORAL THRUSH: Primary | Chronic | ICD-10-CM

## 2024-11-21 DIAGNOSIS — J34.89 NASAL VESTIBULITIS: ICD-10-CM

## 2024-11-21 PROCEDURE — 1159F MED LIST DOCD IN RCRD: CPT | Mod: CPTII,S$GLB,, | Performed by: OTOLARYNGOLOGY

## 2024-11-21 PROCEDURE — 3074F SYST BP LT 130 MM HG: CPT | Mod: CPTII,S$GLB,, | Performed by: OTOLARYNGOLOGY

## 2024-11-21 PROCEDURE — 82180 ASSAY OF ASCORBIC ACID: CPT | Performed by: OTOLARYNGOLOGY

## 2024-11-21 PROCEDURE — 3078F DIAST BP <80 MM HG: CPT | Mod: CPTII,S$GLB,, | Performed by: OTOLARYNGOLOGY

## 2024-11-21 PROCEDURE — 84630 ASSAY OF ZINC: CPT | Performed by: OTOLARYNGOLOGY

## 2024-11-21 PROCEDURE — 99999 PR PBB SHADOW E&M-EST. PATIENT-LVL IV: CPT | Mod: PBBFAC,,, | Performed by: OTOLARYNGOLOGY

## 2024-11-21 PROCEDURE — 3008F BODY MASS INDEX DOCD: CPT | Mod: CPTII,S$GLB,, | Performed by: OTOLARYNGOLOGY

## 2024-11-21 PROCEDURE — 36415 COLL VENOUS BLD VENIPUNCTURE: CPT | Performed by: OTOLARYNGOLOGY

## 2024-11-21 PROCEDURE — 1160F RVW MEDS BY RX/DR IN RCRD: CPT | Mod: CPTII,S$GLB,, | Performed by: OTOLARYNGOLOGY

## 2024-11-21 PROCEDURE — 84425 ASSAY OF VITAMIN B-1: CPT | Performed by: OTOLARYNGOLOGY

## 2024-11-21 PROCEDURE — 84207 ASSAY OF VITAMIN B-6: CPT | Performed by: OTOLARYNGOLOGY

## 2024-11-21 PROCEDURE — 99204 OFFICE O/P NEW MOD 45 MIN: CPT | Mod: 25,S$GLB,, | Performed by: OTOLARYNGOLOGY

## 2024-11-21 PROCEDURE — 31575 DIAGNOSTIC LARYNGOSCOPY: CPT | Mod: S$GLB,,, | Performed by: OTOLARYNGOLOGY

## 2024-11-21 RX ORDER — FLUCONAZOLE 200 MG/1
TABLET ORAL
Qty: 14 TABLET | Refills: 2 | Status: SHIPPED | OUTPATIENT
Start: 2024-11-21

## 2024-11-21 RX ORDER — MUPIROCIN 20 MG/G
OINTMENT TOPICAL 2 TIMES DAILY
Qty: 22 G | Refills: 3 | Status: SHIPPED | OUTPATIENT
Start: 2024-11-21 | End: 2024-12-01

## 2024-11-21 NOTE — PATIENT INSTRUCTIONS
Ensure your  toothpaste is sodium lauryl sulfate free such as [employer] organic toothpaste or original Crest toothpaste.  Start a multivitamin daily.  Start probiotic daily.  Start alpha lipoic acid.  Add B vitamin, Vitamin D, Lysine, and Zinc supplements.  I would recommend avoiding any sharp foods, spicy foods, cinnamon, and mint products.   Ensure adequate hydration.      Continue diflucan and add [employer] oral rinses.      See GI for workup and continue GERD medicine.      See ID as per PCP recommendations.     Get labs done today.     I will work on referral for nutrition/dietary for post- bariatric surgery.     Stop smoking.

## 2024-11-21 NOTE — Clinical Note
Dear colleagues, I know that neither of you have seen this patient, but I wasn't sure what do to on this, so figured I'd ask.  This patient had gastric sleeve in 2020, and she's have a lot of issues that I feel are very likely due to nutritional issues have arisen since then.  Is there anyone in [facility] who does dietetics consults or nutritional management for patient's who've had bariatric procedures?  How do a facilitate that consult  I appreciate any help for this.  She's an [employer] employee and is really struggling with this issue.    Adina Shay MD

## 2024-11-21 NOTE — PROGRESS NOTES
Chief Complaint   Patient presents with    Consult     Pt stated she has an oral thrush , pain in  throat , trouble swallowing        HPI: Sore Throat   This is a chronic problem. The current episode started more than 1 month ago. The problem has been waxing and waning (Has had recurrent issues with cheilitis, glossitis, sores in the nose, and was recently treated for oral thrush). Sore throat worse side: Throughout entire throat. There has been no fever. The pain is moderate. Associated symptoms include neck pain and trouble swallowing. Pertinent negatives include no hoarse voice. She has had exposure to strep. Exposure to: Patient did test positive for strep early on but recent throat culture showed only normal samuel. Treatments tried: Patient treated with nystatin and oral Diflucan which has helped.     Patient notes that she had a Gastric sleeve done in 2020.  She has not been taking vitamins or eating properly and does feel that she is nutritionally depleted.  She has had significant anemia on labs and was having significant menstrual bleeding.  She had ablation done a few months ago but has continued to be anemic since that time.  She does not have a history of fever blisters or herpes virus infection.  PCP did workup for some vitamin levels and ruled out autoimmune issues with CARLOS and other autoimmune labs, all of which were normal.  CBC has continued to show significant abnormalities and anemia.  Patient works as a nurse on the night shift, so she does admit that she does not sleep and eat as she should to help support her nutritionally.    Labs reviewed:   Latest Reference Range & Units 11/07/24 10:56   WBC 3.90 - 12.70 K/uL 6.53   RBC 4.00 - 5.40 M/uL 3.78 (L)   Hemoglobin 12.0 - 16.0 g/dL 7.5 (L)   Hematocrit 37.0 - 48.5 % 27.2 (L)   MCV 82 - 98 fL 72 (L)   MCH 27.0 - 31.0 pg 19.8 (L)   MCHC 32.0 - 36.0 g/dL 27.6 (L)   RDW 11.5 - 14.5 % 18.9 (H)   Platelet Count 150 - 450 K/uL 674 (H)   MPV 9.2 - 12.9  fL 8.6 (L)   Gran % 38.0 - 73.0 % 56.0   Lymph % 18.0 - 48.0 % 30.8   Mono % 4.0 - 15.0 % 9.8   Eos % 0.0 - 8.0 % 2.3   Basophil % 0.0 - 1.9 % 0.8   Immature Granulocytes 0.0 - 0.5 % 0.3   Gran # (ANC) 1.8 - 7.7 K/uL 3.7   Lymph # 1.0 - 4.8 K/uL 2.0   Mono # 0.3 - 1.0 K/uL 0.6   Eos # 0.0 - 0.5 K/uL 0.2   Baso # 0.00 - 0.20 K/uL 0.05   Immature Grans (Abs) 0.00 - 0.04 K/uL 0.02   nRBC 0 /100 WBC 0   Differential Method  Automated   (L): Data is abnormally low  (H): Data is abnormally high     Latest Reference Range & Units 11/07/24 10:56   CARLOS Screen Negative <1:80  Negative <1:80   ds DNA Ab Negative 1:10  Negative 1:10   DNA Titer  0   Anti-SSA Antibody 0.00 - 0.99 Ratio 0.03   Anti-SSA Interpretation Negative  Negative   CCP Antibodies <5.0 U/mL 0.9   Rheumatoid Factor 0.0 - 15.0 IU/mL <13.0       CULTURE, RESPIRATORY  - THROAT  Order: [id number]  Status: Final result       Visible to patient: Yes (seen)       Next appt: 12/16/2024 at 02:30 PM in Infectious Diseases (Wilbert Mas PA-C)       Dx: Thrush    Specimen Information: Throat   0 Result Notes      Component 2 mo ago   RESPIRATORY CULTURE - THROAT Normal respiratory samuel   Resulting Agency [facility]                      Past Medical History:   Diagnosis Date    Coronary artery disease     large heart noted on CXR 2014, No problems    Diabetes mellitus     previous and current pregnancy, diet controlled. Gestational DM only    Gestational diabetes     Hypertension     preEclampsia    Pregnancy     x 2; preecalmpsia for second one     Social History     Socioeconomic History    Marital status: Single   Tobacco Use    Smoking status: Every Day     Types: Vaping with nicotine     Start date: 03/2022    Smokeless tobacco: Never    Tobacco comments:     PATIENT VAPES' 1 TIME WEEKLY AS OF 11/7/2024   Substance and Sexual Activity    Alcohol use: Yes     Comment: couple drinks daily    Drug use: No    Sexual activity: Yes     Social Drivers of Health     Financial  Resource Strain: Patient Declined (2023)    Overall Financial Resource Strain (CARDIA)     Difficulty of Paying Living Expenses: Patient declined   Food Insecurity: Patient Declined (2023)    Hunger Vital Sign     Worried About Running Out of Food in the Last Year: Patient declined     Ran Out of Food in the Last Year: Patient declined   Transportation Needs: Patient Declined (2023)    PRAPARE - Transportation     Lack of Transportation (Medical): Patient declined     Lack of Transportation (Non-Medical): Patient declined   Physical Activity: Insufficiently Active (2023)    Exercise Vital Sign     Days of Exercise per Week: 3 days     Minutes of Exercise per Session: 30 min   Stress: Stress Concern Present (2023)    [employer] - Occupational Stress Questionnaire     Feeling of Stress : To some extent   Housing Stability: Unknown (2023)    Housing Stability Vital Sign     Unable to Pay for Housing in the Last Year: Patient refused     Unstable Housing in the Last Year: No     Past Surgical History:   Procedure Laterality Date     SECTION      DILATION AND CURETTAGE OF UTERUS N/A 2024    Procedure: DILATION AND CURETTAGE, UTERUS;  Surgeon: Wiedemann, Michael A., MD;  Location: [facility] OR;  Service: OB/GYN;  Laterality: N/A;    ENDOMETRIAL ABLATION N/A 2024    Procedure: ABLATION, ENDOMETRIUM;  Surgeon: Wiedemann, Michael A., MD;  Location: [facility] OR;  Service: OB/GYN;  Laterality: N/A;    gastric sleeve      HYSTEROSCOPY N/A 2024    Procedure: HYSTEROSCOPY;  Surgeon: Wiedemann, Michael A., MD;  Location: [facility] OR;  Service: OB/GYN;  Laterality: N/A;    TONSILLECTOMY       Family History   Problem Relation Name Age of Onset    Diabetes Mother Mom     Hypertension Father Cj     Diabetes Maternal Grandmother Darkene            Review of Systems  General: negative for chills, fever or weight loss  Psychological: negative for mood changes or  depression  Ophthalmic: negative for blurry vision, photophobia or eye pain  ENT: see HPI  Respiratory: no cough, shortness of breath, or wheezing  Cardiovascular: no chest pain or dyspnea on exertion  Gastrointestinal: no abdominal pain, change in bowel habits, or black/ bloody stools  Musculoskeletal: negative for gait disturbance or muscular weakness  Neurological: no syncope or seizures; no ataxia  Dermatological: negative for pruritis,  rash and jaundice  Hematologic/lymphatic: no easy bruising, no new adenopathy      Physical Exam:    Vitals:    11/21/24 0757   BP: 113/73   Pulse: 83         Constitutional:   She is oriented to person, place, and time. Vital signs are normal. She appears well-developed and well-nourished. She appears alert. She is cooperative.  Non-toxic appearance. Normal speech.  No hoarse voice.      Head:  Normocephalic and atraumatic. Salivary glands normal.  Facial strength is normal.      Ears:  Hearing normal to normal and whispered voice; external ear normal without scars, lesions, or masses; ear canal, tympanic membrane, and middle ear normal., right ear hearing normal to normal and whispered voice; external ear normal without scars, lesions, or masses; ear canal, tympanic membrane, and middle ear normal. and left ear hearing normal to normal and whispered voice; external ear normal without scars, lesions, or masses; ear canal, tympanic membrane, and middle ear normal..   Right Ear: Tympanic membrane is not perforated, not erythematous and not retracted. No middle ear effusion.   Left Ear: Tympanic membrane is not perforated, not erythematous and not retracted.  No middle ear effusion.     Nose:  Mucosal edema present. No rhinorrhea, septal deviation, nasal septal hematoma or polyps. Turbinate hypertrophy (2+ size).  No turbinate masses.  Right sinus exhibits no maxillary sinus tenderness and no frontal sinus tenderness. Left sinus exhibits no maxillary sinus tenderness and no  frontal sinus tenderness.         Mouth/Throat  Oropharynx clear and moist without lesions or asymmetry, normal uvula midline, lips, teeth, and gums normal and oropharynx normal. Normal dentition. Xerostomia present. No uvula swelling or oral lesions. No oropharyngeal exudate, posterior oropharyngeal edema or posterior oropharyngeal erythema. Tonsils present, +1.  Mirror exam not performed due to patient tolerance.  Mirror exam not performed due to patient tolerance.    Mucous membranes and lips dry.  Mild fissuring and glossitis of tongue.  No white plaques or erythematous areas of the throat noted.  No posterior pharyngeal or tonsillar exudates or erythema at this time.      Neck:  Neck normal without thyromegaly masses, asymmetry, normal tracheal structure, crepitus, and tenderness, thyroid normal, trachea normal, full range of motion with neck supple and no adenopathy. No JVD present. Carotid bruit is not present. Thyroid tenderness is present. No edema and no erythema present. No thyroid mass and no thyromegaly present.     She has no cervical adenopathy.     Cardiovascular:    Normal rate, regular rhythm, normal heart sounds and rate and rhythm, heart sounds, and pulses normal.              Pulmonary/Chest:   Effort and breath sounds normal.     Psychiatric:   She has a normal mood and affect. Her speech is normal and behavior is normal.     Neurological:   She is alert and oriented to person, place, and time. She has neurological normal, alert and oriented. No cranial nerve deficit.     Skin:   No abrasions, lacerations, lesions, or rashes.         Laryngoscopy    Date/Time: 11/21/2024 8:00 AM    Performed by: Adina Shay MD  Authorized by: Adina Shay MD    Consent Done?:  Yes (Verbal)  Anesthesia:     Local anesthetic:  Topical anesthetic    Patient tolerance:  Patient tolerated the procedure well with no immediate complications    Decongestion performed?: Yes    Laryngoscopy:     Areas examined:   Nasal cavities, vocal cords, nasopharynx, oropharynx, hypopharynx and larynx  Nose External:      No external nasal deformity  Nose Intranasal:      Mucosa no polyps     Mucosa ulcers not present     No mucosa lesions present     Septum gross deformity (slight deviation overall to left)     Turbinates not enlarged  Nasopharynx:      No mucosa lesions     Adenoids not present     Posterior choanae patent     Eustachian tube patent  Larynx/hypopharynx:      No epiglottis lesions (erythema of the mucosa of the laryngeal surface of the epiglottis)     No epiglottis edema     No AE folds lesions     No vocal cord polyps     Equal and normal bilateral     No hypopharynx lesions     No piriform sinus pooling     No piriform sinus lesions     Post cricoid edema     Post cricoid erythema     Generalized erythema of the mucosa of the laryngeal surface of the epiglottis, arytenoids, postcricoid and interarytenoid areas.  No white plaques or ongoing evidence of active thrush.  Findings consistent with resolving thrush and additionally of acid reflux.                  Assessment:    ICD-10-CM ICD-9-CM    1. Oral thrush  B37.0 112.0 Ambulatory referral/consult to ENT      Vitamin B1      Vitamin B6      Vitamin C      Zinc      fluconazole (DIFLUCAN) 200 MG Tab      2. Glossitis  K14.0 529.0 Vitamin B1      Vitamin B6      Vitamin C      Zinc      3. H/O gastric sleeve  Z90.3 V15.29       4. Anemia, unspecified type  D64.9 285.9       5. Nasal vestibulitis  J34.89 478.19 mupirocin (BACTROBAN) 2 % ointment      6. Laryngopharyngeal reflux (LPR)  K21.9 478.79         The primary encounter diagnosis was Oral thrush. Diagnoses of Glossitis, H/O gastric sleeve, Anemia, unspecified type, Nasal vestibulitis, and Laryngopharyngeal reflux (LPR) were also pertinent to this visit.      Plan:    Labs done today to assess CBC and nutritional parameters.    I have encouraged the patient to take multivitamin, vitamin-B and D, zinc, and a  probiotic.    I will continue the Diflucan and add an oral rinse from [employer] for continued treatment of thrush.    Mupirocin ointment to nose and corners of mouth.  May continue Aquaphor as needed as well.    I would like the patient to see Gastroenterology to assess the esophagus given the recurrent issues with esophagitis and thrush.  She should continue her reflux medication.    I will reach out to gastroenterology for any recommendations on who she can see for dietary/nutritional support given her previous bariatric surgery, as I feel this is probably the biggest factor in all of her ongoing issues.    PCP had suggested possible immunologic or infectious disease workup, which she can also continue to pursue.    Adina Shay MD

## 2024-11-25 ENCOUNTER — LAB VISIT (OUTPATIENT)
Dept: LAB | Facility: HOSPITAL | Age: 39
End: 2024-11-25
Attending: STUDENT IN AN ORGANIZED HEALTH CARE EDUCATION/TRAINING PROGRAM
Payer: COMMERCIAL

## 2024-11-25 DIAGNOSIS — D64.9 SEVERE ANEMIA: ICD-10-CM

## 2024-11-25 LAB
BASOPHILS # BLD AUTO: 0.04 K/UL (ref 0–0.2)
BASOPHILS NFR BLD: 0.5 % (ref 0–1.9)
DIFFERENTIAL METHOD BLD: ABNORMAL
EOSINOPHIL # BLD AUTO: 0.1 K/UL (ref 0–0.5)
EOSINOPHIL NFR BLD: 1.9 % (ref 0–8)
ERYTHROCYTE [DISTWIDTH] IN BLOOD BY AUTOMATED COUNT: 19 % (ref 11.5–14.5)
FERRITIN SERPL-MCNC: 3 NG/ML (ref 20–300)
FOLATE SERPL-MCNC: 7.3 NG/ML (ref 4–24)
HAPTOGLOB SERPL-MCNC: 166 MG/DL (ref 30–250)
HCT VFR BLD AUTO: 27.3 % (ref 37–48.5)
HGB BLD-MCNC: 7.4 G/DL (ref 12–16)
IMM GRANULOCYTES # BLD AUTO: 0.01 K/UL (ref 0–0.04)
IMM GRANULOCYTES NFR BLD AUTO: 0.1 % (ref 0–0.5)
IRON SERPL-MCNC: 19 UG/DL (ref 30–160)
LDH SERPL L TO P-CCNC: 167 U/L (ref 110–260)
LYMPHOCYTES # BLD AUTO: 2.8 K/UL (ref 1–4.8)
LYMPHOCYTES NFR BLD: 37.7 % (ref 18–48)
MCH RBC QN AUTO: 19.7 PG (ref 27–31)
MCHC RBC AUTO-ENTMCNC: 27.1 G/DL (ref 32–36)
MCV RBC AUTO: 73 FL (ref 82–98)
MONOCYTES # BLD AUTO: 0.6 K/UL (ref 0.3–1)
MONOCYTES NFR BLD: 8.3 % (ref 4–15)
NEUTROPHILS # BLD AUTO: 3.8 K/UL (ref 1.8–7.7)
NEUTROPHILS NFR BLD: 51.5 % (ref 38–73)
NRBC BLD-RTO: 0 /100 WBC
PLATELET # BLD AUTO: 721 K/UL (ref 150–450)
PMV BLD AUTO: 8.5 FL (ref 9.2–12.9)
RBC # BLD AUTO: 3.76 M/UL (ref 4–5.4)
SATURATED IRON: 3 % (ref 20–50)
TOTAL IRON BINDING CAPACITY: 675 UG/DL (ref 250–450)
TRANSFERRIN SERPL-MCNC: 456 MG/DL (ref 200–375)
VIT B12 SERPL-MCNC: 1314 PG/ML (ref 210–950)
VIT C SERPL-MCNC: 3 MG/L (ref 2–19)
WBC # BLD AUTO: 7.38 K/UL (ref 3.9–12.7)
ZINC SERPL-MCNC: 73 UG/DL (ref 60–130)

## 2024-11-25 PROCEDURE — 83615 LACTATE (LD) (LDH) ENZYME: CPT | Performed by: STUDENT IN AN ORGANIZED HEALTH CARE EDUCATION/TRAINING PROGRAM

## 2024-11-25 PROCEDURE — 82746 ASSAY OF FOLIC ACID SERUM: CPT | Performed by: STUDENT IN AN ORGANIZED HEALTH CARE EDUCATION/TRAINING PROGRAM

## 2024-11-25 PROCEDURE — 36415 COLL VENOUS BLD VENIPUNCTURE: CPT | Performed by: STUDENT IN AN ORGANIZED HEALTH CARE EDUCATION/TRAINING PROGRAM

## 2024-11-25 PROCEDURE — 85025 COMPLETE CBC W/AUTO DIFF WBC: CPT | Performed by: STUDENT IN AN ORGANIZED HEALTH CARE EDUCATION/TRAINING PROGRAM

## 2024-11-25 PROCEDURE — 82728 ASSAY OF FERRITIN: CPT | Performed by: STUDENT IN AN ORGANIZED HEALTH CARE EDUCATION/TRAINING PROGRAM

## 2024-11-25 PROCEDURE — 83010 ASSAY OF HAPTOGLOBIN QUANT: CPT | Performed by: STUDENT IN AN ORGANIZED HEALTH CARE EDUCATION/TRAINING PROGRAM

## 2024-11-25 PROCEDURE — 84466 ASSAY OF TRANSFERRIN: CPT | Performed by: STUDENT IN AN ORGANIZED HEALTH CARE EDUCATION/TRAINING PROGRAM

## 2024-11-25 PROCEDURE — 82607 VITAMIN B-12: CPT | Performed by: STUDENT IN AN ORGANIZED HEALTH CARE EDUCATION/TRAINING PROGRAM

## 2024-11-26 LAB — PYRIDOXAL SERPL-MCNC: 55 UG/L (ref 5–50)

## 2024-11-27 LAB — VIT B1 BLD-MCNC: 70 UG/L (ref 38–122)

## 2024-11-27 NOTE — PROGRESS NOTES
Labs reviewed and vitamin levels all appear within normal limits.  I would still have the consultation with nutrition, and I did place an order for this.     Adina Shay MD  [facility]

## 2024-11-29 ENCOUNTER — PATIENT MESSAGE (OUTPATIENT)
Dept: OTOLARYNGOLOGY | Facility: CLINIC | Age: 39
End: 2024-11-29
Payer: COMMERCIAL

## 2024-12-07 ENCOUNTER — PATIENT MESSAGE (OUTPATIENT)
Dept: GASTROENTEROLOGY | Facility: CLINIC | Age: 39
End: 2024-12-07
Payer: COMMERCIAL

## 2024-12-16 ENCOUNTER — OFFICE VISIT (OUTPATIENT)
Dept: GASTROENTEROLOGY | Facility: CLINIC | Age: 39
End: 2024-12-16
Payer: COMMERCIAL

## 2024-12-16 VITALS — HEIGHT: 65 IN | WEIGHT: 169 LBS | BODY MASS INDEX: 28.16 KG/M2

## 2024-12-16 DIAGNOSIS — B37.9 CANDIDA ALBICANS INFECTION: Primary | ICD-10-CM

## 2024-12-16 PROCEDURE — 99204 OFFICE O/P NEW MOD 45 MIN: CPT | Mod: 95,,,

## 2024-12-16 PROCEDURE — 3008F BODY MASS INDEX DOCD: CPT | Mod: CPTII,95,,

## 2024-12-16 PROCEDURE — 1159F MED LIST DOCD IN RCRD: CPT | Mod: CPTII,95,,

## 2024-12-16 NOTE — PROGRESS NOTES
The patient location is: Car   The chief complaint leading to consultation is: thrush    Visit type: audiovisual    Face to Face time with patient: 5 mins  15 minutes of total time spent on the encounter, which includes face to face time and non-face to face time preparing to see the patient (eg, review of tests), Obtaining and/or reviewing separately obtained history, Documenting clinical information in the electronic or other health record, Independently interpreting results (not separately reported) and communicating results to the patient/family/caregiver, or Care coordination (not separately reported).     Each patient to whom he or she provides medical services by telemedicine is:  (1) informed of the relationship between the physician and patient and the respective role of any other health care provider with respect to management of the patient; and (2) notified that he or she may decline to receive medical services by telemedicine and may withdraw from such care at any time.                                                                                 Gastroenterology Progress Note    Reason for Visit:  The encounter diagnosis was Candida albicans infection.    PCP:   Wilbert Willoughby         Initial HPI   This is a 39 y.o. female presenting for thrush since the summertime. She was started on Diflucan daily for the last month. Just finished her treatment. Recommended by her ENT to have an EGD. Does report that she will have intermittent esophageal pain, but this is improved since completing her Diflucan.         ROS:  Review of Systems   Constitutional:  Negative for chills, fever, malaise/fatigue and weight loss.   HENT:  Negative for sore throat.    Eyes:  Negative for redness.   Gastrointestinal:  Negative for abdominal pain, blood in stool, constipation, diarrhea, heartburn, melena, nausea and vomiting.   Skin:  Negative for rash.   Neurological:  Negative for dizziness, loss of consciousness and  "weakness.        Medical History:  has a past medical history of Coronary artery disease, Diabetes mellitus, Gestational diabetes, Hypertension, and Pregnancy.    Surgical History:  has a past surgical history that includes Tonsillectomy;  section; gastric sleeve; Endometrial ablation (N/A, 2024); Dilation and curettage of uterus (N/A, 2024); and Hysteroscopy (N/A, 2024).    Family History: family history includes Diabetes in her maternal grandmother and mother; Hypertension in her father..       Review of patient's allergies indicates:  No Known Allergies    Current Outpatient Medications on File Prior to Visit   Medication Sig Dispense Refill    fluconazole (DIFLUCAN) 200 MG Tab Take 1 tablet by mouth daily 14 tablet 2    ibuprofen (ADVIL,MOTRIN) 600 MG tablet Take 1 tablet (600 mg total) by mouth every 6 (six) hours as needed. 20 tablet 1    LIDOcaine viscous HCl 2% (LIDOCAINE VISCOUS) 2 % Soln 15 mL as needed Mouth/Throat every 3 hrs As needed for pain 100 mL 0    lisdexamfetamine (VYVANSE) 50 MG capsule TAKE 1 CAPSULE BY MOUTH ONCE DAILY IN THE MORNING. 30 capsule 0    nystatin (MYCOSTATIN) powder Apply topically 2 (two) times daily. 60 g 11    pantoprazole (PROTONIX) 40 MG tablet Take 1 tablet (40 mg total) by mouth once daily. 90 tablet 1    potassium chloride SA (K-DUR,KLOR-CON M) 10 MEQ tablet Take 1 tablet (10 mEq total) by mouth once daily. Take with lasix daily 30 tablet 5    traMADoL (ULTRAM) 50 mg tablet Take 1 tablet (50 mg total) by mouth daily as needed for Pain. For foot pain 30 each 0     Current Facility-Administered Medications on File Prior to Visit   Medication Dose Route Frequency Provider Last Rate Last Admin    lactated ringers infusion   Intravenous Continuous Jason Guerra DNP        LIDOcaine (PF) 10 mg/ml (1%) injection 10 mg  1 mL Intradermal Once Jason Guerra DNP             Objective Findings:    Vital Signs:  Ht 5' 5" (1.651 m)   Wt 76.7 kg (169 lb)  "  BMI 28.12 kg/m²   Body mass index is 28.12 kg/m².    Physical Exam:  Physical Exam        Labs:  Lab Results   Component Value Date    WBC 7.38 11/25/2024    HGB 7.4 (L) 11/25/2024    HCT 27.3 (L) 11/25/2024     (H) 11/25/2024    CRP 1.5 11/07/2024    CHOL 122 08/18/2023    TRIG 28 (L) 08/18/2023    HDL 51 08/18/2023    ALKPHOS 78 11/07/2024    ALT 26 11/07/2024    AST 22 11/07/2024     11/07/2024    K 3.7 11/07/2024     11/07/2024    CREATININE 0.8 11/07/2024    BUN 21 (H) 11/07/2024    CO2 24 11/07/2024    TSH 1.143 08/18/2023    INR 0.9 07/31/2017    HGBA1C 5.4 04/28/2022         Imaging reviewed: No pertinent imaging reviewed       Endoscopy reviewed: Laryngoscopy 11/21/2024  Nasopharynx:     No mucosa lesions     Adenoids not present     Posterior choanae patent     Eustachian tube patent  Larynx/hypopharynx:     No epiglottis lesions (erythema of the mucosa of the laryngeal surface  of the epiglottis)     No epiglottis edema     No AE folds lesions     No vocal cord polyps     Equal and normal bilateral     No hypopharynx lesions     No piriform sinus pooling     No piriform sinus lesions     Post cricoid edema     Post cricoid erythema     Generalized erythema of the mucosa of the laryngeal surface of the  epiglottis, arytenoids, postcricoid and interarytenoid areas.  No white plaques or ongoing evidence of active thrush.  Findings consistent with resolving thrush and additionally of acid reflux.      Last Resulted: 11/21/24 08:00 CST           Assessment:  1. Candida albicans infection             Recommendations:  Referral for EGD       Thank you for allowing me to participate in this patient's care.    Sincerely,     Vannesa Todd NP  Gastroenterology Department  [facility]

## 2024-12-16 NOTE — PROGRESS NOTES
"GENERAL GI PATIENT INTAKE:    COVID symptoms in the last 7 days (runny nose, sore throat, congestion, cough, fever): No  PCP: Wilbert Willoughby  If not PCP-  number given to establish [phone]: N/A    ALLERGIES REVIEWED:  Yes    CHIEF COMPLAINT:    Chief Complaint   Patient presents with    Gastroesophageal Reflux       VITAL SIGNS:  Ht 5' 5" (1.651 m)   Wt 76.7 kg (169 lb)   BMI 28.12 kg/m²      Change in medical, surgical, family or social history: No      REVIEWED MEDICATION LIST RECONCILED INCLUDING ABOVE MEDS:  Yes     "

## 2024-12-19 ENCOUNTER — PATIENT MESSAGE (OUTPATIENT)
Dept: GASTROENTEROLOGY | Facility: CLINIC | Age: 39
End: 2024-12-19
Payer: COMMERCIAL

## 2024-12-24 ENCOUNTER — TELEPHONE (OUTPATIENT)
Dept: ENDOSCOPY | Facility: HOSPITAL | Age: 39
End: 2024-12-24
Payer: COMMERCIAL

## 2024-12-24 VITALS — WEIGHT: 169 LBS | BODY MASS INDEX: 28.16 KG/M2 | HEIGHT: 65 IN

## 2024-12-24 DIAGNOSIS — B37.81 CANDIDA ESOPHAGITIS: Primary | ICD-10-CM

## 2024-12-24 NOTE — TELEPHONE ENCOUNTER
"----- Message from Med Assistant Coles sent at 2024  1:02 PM CST -----  Regarding: FW: EGD    ----- Message -----  From: Ani Jordan  Sent: 2024  10:00 AM CST  To: Sanket Ford MA  Subject: FW: EGD                                            ----- Message -----  From: Vannesa Todd NP  Sent: 2024  10:31 AM CST  To: [facility]  Subject: EGD                                              Procedure: EGD    Diagnosis: Candida esophagitis     Procedure Timin-12 weeks    #If within 4 weeks selected, please corey as high priority#    #If greater than 12 weeks, please select "5-12 weeks" and delay sending until 3 months prior to requested date#     Location: Any Site    Additional Scheduling Information: No scheduling concerns    Prep Specifications:NA    Is the patient taking a GLP-1 Agonist:no    Have you attached a patient to this message: yes    ##Best to call before 9:30 AM and after 3:00pm.  "

## 2024-12-24 NOTE — TELEPHONE ENCOUNTER
Referral for procedure from [facility]      Spoke to pt to schedule procedure(s) Upper Endoscopy (EGD)       Physician to perform procedure(s) Dr. JUAN JOSE Hoover  Date of Procedure (s) 02/11/25  Arrival Time 8:00 AM  Time of Procedure(s) 9:00 AM   Location of Procedure(s) [place] 2nd Floor  Type of Rx Prep sent to patient: N/A  Instructions provided to patient via [facility]    Patient was informed on the following information and verbalized understanding. Screening questionnaire reviewed with patient and complete. If procedure requires anesthesia, a responsible adult needs to be present to accompany the patient home, patient cannot drive after receiving anesthesia. Appointment details are tentative, especially check-in time. Patient will receive a prep-op call 7 days prior to confirm check-in time for procedure. If applicable the patient should contact their pharmacy to verify Rx for procedure prep is ready for pick-up. Patient was advised to call the scheduling department at [phone] if pharmacy states no Rx is available. Patient was advised to call the endoscopy scheduling department if any questions or concerns arise.       Endoscopy Scheduling Department

## 2025-01-15 DIAGNOSIS — B37.0 ORAL THRUSH: Chronic | ICD-10-CM

## 2025-01-15 DIAGNOSIS — M54.31 BILATERAL SCIATICA: ICD-10-CM

## 2025-01-15 DIAGNOSIS — M54.32 BILATERAL SCIATICA: ICD-10-CM

## 2025-01-15 DIAGNOSIS — F90.2 ATTENTION DEFICIT HYPERACTIVITY DISORDER (ADHD), COMBINED TYPE: ICD-10-CM

## 2025-01-15 RX ORDER — TRAMADOL HYDROCHLORIDE 50 MG/1
50 TABLET ORAL DAILY PRN
Qty: 30 EACH | Refills: 0 | Status: SHIPPED | OUTPATIENT
Start: 2025-01-15

## 2025-01-15 RX ORDER — LISDEXAMFETAMINE DIMESYLATE 50 MG/1
CAPSULE ORAL
Qty: 30 CAPSULE | Refills: 0 | Status: SHIPPED | OUTPATIENT
Start: 2025-01-15

## 2025-01-15 NOTE — TELEPHONE ENCOUNTER
Unable to retrieve patient chart and identify care due.  [facility] Embedded Care Due Messages. Reference number: [id number].   1/15/2025 9:37:50 AM CST

## 2025-01-15 NOTE — TELEPHONE ENCOUNTER
Unable to retrieve patient chart and identify care due.  [facility] Embedded Care Due Messages. Reference number: [id number].   1/15/2025 9:38:00 AM CST

## 2025-01-15 NOTE — TELEPHONE ENCOUNTER
Refill Routing Note   Medication(s) are not appropriate for processing by [facility] for the following reason(s):        Outside of protocol    [facility] action(s):  Route               Appointments  past 12m or future 3m with PCP    Date Provider   Last Visit   11/7/2024 Wilbert Willoughby MD   Next Visit   Visit date not found Wilbert Willoughby MD   ED visits in past 90 days: 0        Note composed:9:56 AM 01/15/2025

## 2025-01-16 RX ORDER — FLUCONAZOLE 200 MG/1
TABLET ORAL
Qty: 14 TABLET | Refills: 2 | Status: SHIPPED | OUTPATIENT
Start: 2025-01-16

## 2025-02-06 ENCOUNTER — ANESTHESIA EVENT (OUTPATIENT)
Dept: ENDOSCOPY | Facility: HOSPITAL | Age: 40
End: 2025-02-06
Payer: COMMERCIAL

## 2025-02-06 NOTE — ANESTHESIA PREPROCEDURE EVALUATION
02/06/2025  Corrine Gan is a 39 y.o., female.    Procedure: EGD (ESOPHAGOGASTRODUODENOSCOPY) (N/A)         Patient Active Problem List   Diagnosis    Obesity due to excess calories    Encounter for smoking cessation counseling    Attention deficit hyperactivity disorder (ADHD), combined type    Anemia    Yeast dermatitis    Bilateral sciatica    Gastroesophageal reflux disease    Smokes    Depression       Past Medical History:   Diagnosis Date    Coronary artery disease     large heart noted on CXR 2014, No problems    Diabetes mellitus     previous and current pregnancy, diet controlled. Gestational DM only    Gestational diabetes     Hypertension     preEclampsia    Pregnancy     x 2; preecalmpsia for second one       ECHO: No results found for this or any previous visit.      There is no height or weight on file to calculate BMI.    Tobacco Use: High Risk (11/7/2024)    Patient History     Smoking Tobacco Use: Every Day     Smokeless Tobacco Use: Never     Passive Exposure: Not on file       Social History     Substance and Sexual Activity   Drug Use No        Alcohol Use: Alcohol Misuse (12/16/2024)    AUDIT-C     Frequency of Alcohol Consumption: 2-4 times a month     Average Number of Drinks: 1 or 2     Frequency of Binge Drinking: Monthly       Review of patient's allergies indicates:  No Known Allergies      Airway:  No value filed.    Pre-op Assessment    I have reviewed the Patient Summary Reports.     I have reviewed the Nursing Notes. I have reviewed the NPO Status.   I have reviewed the Medications.     Review of Systems  Anesthesia Hx:  No problems with previous Anesthesia             Denies Family Hx of Anesthesia complications.    Denies Personal Hx of Anesthesia complications.                    Social:  Smoker       Hematology/Oncology:    Oncology Normal    -- Anemia:                                   EENT/Dental:  EENT/Dental Normal           Cardiovascular:  Cardiovascular Normal Exercise tolerance: good                                             Pulmonary:  Pulmonary Normal                       Renal/:  Renal/ Normal                 Hepatic/GI:     GERD                Musculoskeletal:  Musculoskeletal Normal                Neurological:  Neurology Normal                                      Endocrine:  Endocrine Normal            Dermatological:  Skin Normal    Psych:  Psychiatric History  depression              Physical Exam  General: Well nourished    Airway:  Mallampati: II   Mouth Opening: Normal  TM Distance: Normal  Tongue: Normal  Neck ROM: Normal ROM    Dental:  Intact        Anesthesia Plan  Type of Anesthesia, risks & benefits discussed:    Anesthesia Type: Gen Natural Airway  Intra-op Monitoring Plan: Standard ASA Monitors  Post Op Pain Control Plan: multimodal analgesia and IV/PO Opioids PRN  Induction:  IV  Informed Consent: Informed consent signed with the Patient and all parties understand the risks and agree with anesthesia plan.  All questions answered. Patient consented to blood products? No  ASA Score: 2  Day of Surgery Review of History & Physical: H&P Update referred to the surgeon/provider.I have interviewed and examined the patient. I have reviewed the patient's H&P dated:     Ready For Surgery From Anesthesia Perspective.     .

## 2025-02-11 ENCOUNTER — ANESTHESIA (OUTPATIENT)
Dept: ENDOSCOPY | Facility: HOSPITAL | Age: 40
End: 2025-02-11
Payer: COMMERCIAL

## 2025-02-11 ENCOUNTER — HOSPITAL ENCOUNTER (OUTPATIENT)
Facility: HOSPITAL | Age: 40
Discharge: HOME | End: 2025-02-11
Attending: STUDENT IN AN ORGANIZED HEALTH CARE EDUCATION/TRAINING PROGRAM | Admitting: STUDENT IN AN ORGANIZED HEALTH CARE EDUCATION/TRAINING PROGRAM
Payer: COMMERCIAL

## 2025-02-11 VITALS
BODY MASS INDEX: 29.82 KG/M2 | SYSTOLIC BLOOD PRESSURE: 124 MMHG | DIASTOLIC BLOOD PRESSURE: 68 MMHG | WEIGHT: 179 LBS | TEMPERATURE: 98 F | HEART RATE: 71 BPM | OXYGEN SATURATION: 100 % | HEIGHT: 65 IN | RESPIRATION RATE: 18 BRPM

## 2025-02-11 DIAGNOSIS — R13.10 ODYNOPHAGIA: ICD-10-CM

## 2025-02-11 DIAGNOSIS — K21.9 GASTROESOPHAGEAL REFLUX DISEASE, UNSPECIFIED WHETHER ESOPHAGITIS PRESENT: Primary | ICD-10-CM

## 2025-02-11 LAB
B-HCG UR QL: NEGATIVE
CTP QC/QA: YES

## 2025-02-11 PROCEDURE — 81025 URINE PREGNANCY TEST: CPT | Performed by: STUDENT IN AN ORGANIZED HEALTH CARE EDUCATION/TRAINING PROGRAM

## 2025-02-11 PROCEDURE — 37000008 HC ANESTHESIA 1ST 15 MINUTES: Performed by: STUDENT IN AN ORGANIZED HEALTH CARE EDUCATION/TRAINING PROGRAM

## 2025-02-11 PROCEDURE — 94761 N-INVAS EAR/PLS OXIMETRY MLT: CPT

## 2025-02-11 PROCEDURE — 99900035 HC TECH TIME PER 15 MIN (STAT)

## 2025-02-11 PROCEDURE — 25000003 PHARM REV CODE 250: Performed by: NURSE ANESTHETIST, CERTIFIED REGISTERED

## 2025-02-11 PROCEDURE — 27201012 HC FORCEPS, HOT/COLD, DISP: Performed by: STUDENT IN AN ORGANIZED HEALTH CARE EDUCATION/TRAINING PROGRAM

## 2025-02-11 PROCEDURE — 63600175 PHARM REV CODE 636 W HCPCS: Performed by: NURSE ANESTHETIST, CERTIFIED REGISTERED

## 2025-02-11 PROCEDURE — 43235 EGD DIAGNOSTIC BRUSH WASH: CPT | Performed by: STUDENT IN AN ORGANIZED HEALTH CARE EDUCATION/TRAINING PROGRAM

## 2025-02-11 RX ORDER — PROPOFOL 10 MG/ML
VIAL (ML) INTRAVENOUS
Status: DISCONTINUED | OUTPATIENT
Start: 2025-02-11 | End: 2025-02-11

## 2025-02-11 RX ORDER — LIDOCAINE HYDROCHLORIDE 20 MG/ML
INJECTION, SOLUTION EPIDURAL; INFILTRATION; INTRACAUDAL; PERINEURAL
Status: DISCONTINUED | OUTPATIENT
Start: 2025-02-11 | End: 2025-02-11

## 2025-02-11 RX ORDER — SODIUM CHLORIDE 9 MG/ML
INJECTION, SOLUTION INTRAVENOUS CONTINUOUS
Status: DISCONTINUED | OUTPATIENT
Start: 2025-02-11 | End: 2025-02-11 | Stop reason: HOSPADM

## 2025-02-11 RX ORDER — PROPOFOL 10 MG/ML
VIAL (ML) INTRAVENOUS CONTINUOUS PRN
Status: DISCONTINUED | OUTPATIENT
Start: 2025-02-11 | End: 2025-02-11

## 2025-02-11 RX ADMIN — GLYCOPYRROLATE 0.2 MG: 0.2 INJECTION, SOLUTION INTRAMUSCULAR; INTRAVENOUS at 08:02

## 2025-02-11 RX ADMIN — SODIUM CHLORIDE: 0.9 INJECTION, SOLUTION INTRAVENOUS at 08:02

## 2025-02-11 RX ADMIN — PROPOFOL 80 MG: 10 INJECTION, EMULSION INTRAVENOUS at 08:02

## 2025-02-11 RX ADMIN — PROPOFOL 225 MCG/KG/MIN: 10 INJECTION, EMULSION INTRAVENOUS at 08:02

## 2025-02-11 RX ADMIN — LIDOCAINE HYDROCHLORIDE 60 MG: 20 INJECTION, SOLUTION EPIDURAL; INFILTRATION; INTRACAUDAL; PERINEURAL at 08:02

## 2025-02-11 NOTE — TRANSFER OF CARE
"Anesthesia Transfer of Care Note    Patient: Corrine Sullivanipple    Procedure(s) Performed: Procedure(s) (LRB):  EGD (ESOPHAGOGASTRODUODENOSCOPY) (N/A)    Patient location: PACU    Anesthesia Type: general    Transport from OR: Transported from OR on room air with adequate spontaneous ventilation    Post pain: adequate analgesia    Post assessment: no apparent anesthetic complications    Post vital signs: stable    Level of consciousness: awake and alert    Nausea/Vomiting: no nausea/vomiting    Complications: none    Transfer of care protocol was followed      Last vitals: Visit Vitals  /70 (BP Location: Left arm, Patient Position: Lying)   Pulse 62   Temp 36.9 °C (98.4 °F) (Temporal)   Resp 16   Ht 5' 5" (1.651 m)   Wt 81.2 kg (179 lb)   SpO2 100%   Breastfeeding No   BMI 29.79 kg/m²     "

## 2025-02-11 NOTE — PLAN OF CARE
Pt arrived to recovery dosc via stretcher per GI team. Bedside report received. Pt attached to bedside monitor. VSS. Pt _AAOx4__ post procedure. Pt on room air oxygen; sats _100___%. Pt IV access  infusing NS. Will continue to monitor.

## 2025-02-11 NOTE — PROVATION PATIENT INSTRUCTIONS
Discharge Summary/Instructions after an Endoscopic Procedure  Patient Name: Corrine Gan  Patient MRN: [id number]  Patient YOB: 1985  Tuesday, February 11, 2025  Alejandro Hoover MD  Dear patient,  As a result of recent federal legislation (The Federal Cures Act), you may   receive lab or pathology results from your procedure in your [facility]   account before your physician is able to contact you. Your physician or   their representative will relay the results to you with their   recommendations at their soonest availability.  Thank you,  RESTRICTIONS:  During your procedure today, you received medications for sedation.  These   medications may affect your judgment, balance and coordination.  Therefore,   for 24 hours, you have the following restrictions:   - DO NOT drive a car, operate machinery, make legal/financial decisions,   sign important papers or drink alcohol.    ACTIVITY:  Today: no heavy lifting, straining or running due to procedural   sedation/anesthesia.  The following day: return to full activity including work.  DIET:  Eat and drink normally unless instructed otherwise.     TREATMENT FOR COMMON SIDE EFFECTS:  - Mild abdominal pain, nausea, belching, bloating or excessive gas:  rest,   eat lightly and use a heating pad.  - Sore Throat: treat with throat lozenges and/or gargle with warm salt   water.  - Because air was used during the procedure, expelling large amounts of air   from your rectum or belching is normal.  - If a bowel prep was taken, you may not have a bowel movement for 1-3 days.    This is normal.  SYMPTOMS TO WATCH FOR AND REPORT TO YOUR PHYSICIAN:  1. Abdominal pain or bloating, other than gas cramps.  2. Chest pain.  3. Back pain.  4. Signs of infection such as: chills or fever occurring within 24 hours   after the procedure.  5. Rectal bleeding, which would show as bright red, maroon, or black stools.   (A tablespoon of blood from the rectum is not serious,  especially if   hemorrhoids are present.)  6. Vomiting.  7. Weakness or dizziness.  GO DIRECTLY TO THE NEAREST EMERGENCY ROOM IF YOU HAVE ANY OF THE FOLLOWING:      Difficulty breathing              Chills and/or fever over 101 F   Persistent vomiting and/or vomiting blood   Severe abdominal pain   Severe chest pain   Black, tarry stools   Bleeding- more than one tablespoon   Any other symptom or condition that you feel may need urgent attention  Your doctor recommends these additional instructions:  If any biopsies were taken, your doctors clinic will contact you in 1 to 2   weeks with any results.  - Patient has a contact number available for emergencies.  The signs and   symptoms of potential delayed complications were discussed with the   patient.  Return to normal activities tomorrow.  Written discharge   instructions were provided to the patient.   - Resume regular diet.   - Discharge patient to home.   - Use a proton pump inhibitor PO BID for 8 weeks. Then down to once daily.  For questions, problems or results please call your physician - Alejandro Hoover MD at Work:  [phone].  [facility], EMERGENCY ROOM PHONE NUMBER: [phone]  IF A COMPLICATION OR EMERGENCY SITUATION ARISES AND YOU ARE UNABLE TO REACH   YOUR PHYSICIAN - GO DIRECTLY TO THE EMERGENCY ROOM.  Alejandro Hoover MD  2/11/2025 8:51:47 AM  This report has been verified and signed electronically.  Dear patient,  As a result of recent federal legislation (The Federal Cures Act), you may   receive lab or pathology results from your procedure in your [facility]   account before your physician is able to contact you. Your physician or   their representative will relay the results to you with their   recommendations at their soonest availability.  Thank you,  PROVATION

## 2025-02-11 NOTE — ANESTHESIA POSTPROCEDURE EVALUATION
Anesthesia Post Evaluation    Patient: Corrine Gan    Procedure(s) Performed: Procedure(s) (LRB):  EGD (ESOPHAGOGASTRODUODENOSCOPY) (N/A)    Final Anesthesia Type: general      Patient location during evaluation: PACU  Patient participation: Yes- Able to Participate  Level of consciousness: awake and alert  Post-procedure vital signs: reviewed and stable  Pain management: adequate  Airway patency: patent    PONV status at discharge: No PONV  Anesthetic complications: no      Cardiovascular status: stable  Respiratory status: spontaneous ventilation  Hydration status: euvolemic  Follow-up not needed.              Vitals Value Taken Time   /68 02/11/25 0914   Temp 36.8 °C (98.2 °F) 02/11/25 0848   Pulse 68 02/11/25 0915   Resp 18 02/11/25 0913   SpO2 99 % 02/11/25 0915   Vitals shown include unfiled device data.      Event Time   Out of Recovery 09:03:00         Pain/Philip Score: Philip Score: 10 (2/11/2025  9:03 AM)

## 2025-02-11 NOTE — PLAN OF CARE
Pt in preop bay 2, VSS, meds given and IV inserted. Pt denies any open wounds on body or the use of any weight loss injections. Pt  ready to roll.

## 2025-02-11 NOTE — H&P
Short Stay Endoscopy History and Physical    PCP - Wilbert Willoughby MD     Procedure - EGD  ASA - per anesthesia  Mallampati - per anesthesia  History of Anesthesia problems - no  Family history Anesthesia problems -  no   Plan of anesthesia - General    HPI:  This is a 39 y.o. female here for evaluation of : hx of thrush, odynophagia.     ROS:  Constitutional: No fevers, chills, No weight loss  CV: No chest pain  Pulm: No cough, No shortness of breath  Ophtho: No vision changes  GI: see HPI  Derm: No rash    Medical History:  has a past medical history of Coronary artery disease, Diabetes mellitus, Gestational diabetes, Hypertension, and Pregnancy.    Surgical History:  has a past surgical history that includes Tonsillectomy;  section; gastric sleeve; Endometrial ablation (N/A, 2024); Dilation and curettage of uterus (N/A, 2024); and Hysteroscopy (N/A, 2024).    Family History: family history includes Diabetes in her maternal grandmother and mother; Hypertension in her father.. Otherwise no colon cancer, inflammatory bowel disease, or GI malignancies.    Social History:  reports that she has been smoking vaping with nicotine. She started smoking about 2 years ago. She has never used smokeless tobacco. She reports current alcohol use. She reports that she does not use drugs.    Review of patient's allergies indicates:  No Known Allergies    Medications:   Medications Prior to Admission   Medication Sig Dispense Refill Last Dose/Taking    fluconazole (DIFLUCAN) 200 MG Tab Take 1 tablet by mouth daily 14 tablet 2     ibuprofen (ADVIL,MOTRIN) 600 MG tablet Take 1 tablet (600 mg total) by mouth every 6 (six) hours as needed. 20 tablet 1     LIDOcaine viscous HCl 2% (LIDOCAINE VISCOUS) 2 % Soln 15 mL as needed Mouth/Throat every 3 hrs As needed for pain 100 mL 0     lisdexamfetamine (VYVANSE) 50 MG capsule TAKE 1 CAPSULE BY MOUTH ONCE DAILY IN THE MORNING. 30 capsule 0     nystatin  (MYCOSTATIN) powder Apply topically 2 (two) times daily. 60 g 11     pantoprazole (PROTONIX) 40 MG tablet Take 1 tablet (40 mg total) by mouth once daily. 90 tablet 1     traMADoL (ULTRAM) 50 mg tablet Take 1 tablet (50 mg total) by mouth daily as needed for Pain. For foot pain 30 each 0        Physical Exam:    Vital Signs: There were no vitals filed for this visit.    General Appearance: Well appearing in no acute distress  Abdomen: non distended     Lab Results   Component Value Date    WBC 7.38 11/25/2024    HGB 7.4 (L) 11/25/2024    HCT 27.3 (L) 11/25/2024     (H) 11/25/2024    CHOL 122 08/18/2023    TRIG 28 (L) 08/18/2023    HDL 51 08/18/2023    ALT 26 11/07/2024    AST 22 11/07/2024     11/07/2024    K 3.7 11/07/2024     11/07/2024    CREATININE 0.8 11/07/2024    BUN 21 (H) 11/07/2024    CO2 24 11/07/2024    TSH 1.143 08/18/2023    INR 0.9 07/31/2017    HGBA1C 5.4 04/28/2022       I have explained the risks and benefits of endoscopy procedures to the patient including but not limited to bleeding, perforation, infection, and death.  The patient was asked if they understand and allowed to ask any further questions to their satisfaction.      Shahana Berry MD

## 2025-02-12 ENCOUNTER — PATIENT MESSAGE (OUTPATIENT)
Dept: GASTROENTEROLOGY | Facility: CLINIC | Age: 40
End: 2025-02-12
Payer: COMMERCIAL

## 2025-02-12 DIAGNOSIS — K21.9 GASTROESOPHAGEAL REFLUX DISEASE, UNSPECIFIED WHETHER ESOPHAGITIS PRESENT: ICD-10-CM

## 2025-02-12 RX ORDER — PANTOPRAZOLE SODIUM 40 MG/1
TABLET, DELAYED RELEASE ORAL
Qty: 90 TABLET | Refills: 2 | Status: SHIPPED | OUTPATIENT
Start: 2025-02-12 | End: 2025-10-10

## 2025-03-10 DIAGNOSIS — F90.2 ATTENTION DEFICIT HYPERACTIVITY DISORDER (ADHD), COMBINED TYPE: ICD-10-CM

## 2025-03-10 RX ORDER — LISDEXAMFETAMINE DIMESYLATE 50 MG/1
CAPSULE ORAL
Qty: 30 CAPSULE | Refills: 0 | Status: SHIPPED | OUTPATIENT
Start: 2025-03-10

## 2025-03-10 NOTE — TELEPHONE ENCOUNTER
No care due was identified.  [facility] Embedded Care Due Messages. Reference number: [id number].   3/10/2025 7:09:10 AM CDT

## 2025-03-25 RX ORDER — OMEPRAZOLE 20 MG/1
20 CAPSULE, DELAYED RELEASE ORAL DAILY
Qty: 90 CAPSULE | Refills: 2 | OUTPATIENT
Start: 2025-03-25 | End: 2025-12-20

## 2025-03-25 NOTE — TELEPHONE ENCOUNTER
Refill Decision Note   Corrine Gan  is requesting a refill authorization.  Brief Assessment and Rationale for Refill:  Quick Discontinue     Medication Therapy Plan: Discontinued by: Wilbert Willoughby MD on 11/7/2024      Comments:     Note composed:10:40 AM 03/25/2025

## 2025-03-25 NOTE — TELEPHONE ENCOUNTER
No care due was identified.  [facility] Embedded Care Due Messages. Reference number: [id number].   3/25/2025 9:37:39 AM CDT

## 2025-03-28 DIAGNOSIS — M54.32 BILATERAL SCIATICA: ICD-10-CM

## 2025-03-28 DIAGNOSIS — M54.31 BILATERAL SCIATICA: ICD-10-CM

## 2025-03-29 NOTE — TELEPHONE ENCOUNTER
No care due was identified.  [facility] Embedded Care Due Messages. Reference number: [id number].   3/28/2025 11:13:00 PM CDT

## 2025-03-31 RX ORDER — TRAMADOL HYDROCHLORIDE 50 MG/1
50 TABLET ORAL DAILY PRN
Qty: 30 EACH | Refills: 0 | Status: SHIPPED | OUTPATIENT
Start: 2025-03-31

## 2025-04-21 ENCOUNTER — PATIENT OUTREACH (OUTPATIENT)
Dept: ADMINISTRATIVE | Facility: HOSPITAL | Age: 40
End: 2025-04-21
Payer: COMMERCIAL

## 2025-04-30 DIAGNOSIS — Z12.31 OTHER SCREENING MAMMOGRAM: ICD-10-CM

## 2025-05-01 DIAGNOSIS — F90.2 ATTENTION DEFICIT HYPERACTIVITY DISORDER (ADHD), COMBINED TYPE: ICD-10-CM

## 2025-05-02 RX ORDER — LISDEXAMFETAMINE DIMESYLATE 50 MG/1
CAPSULE ORAL
Qty: 30 CAPSULE | Refills: 0 | Status: SHIPPED | OUTPATIENT
Start: 2025-05-02

## 2025-05-02 NOTE — TELEPHONE ENCOUNTER
No care due was identified.  [facility] Embedded Care Due Messages. Reference number: [id number].   5/01/2025 10:32:44 PM CDT

## 2025-05-02 NOTE — TELEPHONE ENCOUNTER
Refill Routing Note   Medication(s) are not appropriate for processing by [facility] for the following reason(s):        Outside of protocol    [facility] action(s):  Route               Appointments  past 12m or future 3m with PCP    Date Provider   Last Visit   9/19/2023 Wiedemann, Michael A., MD   Next Visit   Visit date not found Wiedemann, Michael A., MD   ED visits in past 90 days: 0        Note composed:7:22 AM 05/02/2025

## 2025-05-05 RX ORDER — IBUPROFEN 600 MG/1
600 TABLET ORAL EVERY 6 HOURS PRN
Qty: 20 TABLET | Refills: 1 | Status: SHIPPED | OUTPATIENT
Start: 2025-05-05 | End: 2026-05-05

## 2025-06-16 DIAGNOSIS — F90.2 ATTENTION DEFICIT HYPERACTIVITY DISORDER (ADHD), COMBINED TYPE: ICD-10-CM

## 2025-06-16 NOTE — TELEPHONE ENCOUNTER
No care due was identified.  [facility] Embedded Care Due Messages. Reference number: [id number].   6/16/2025 6:08:26 PM CDT

## 2025-06-17 ENCOUNTER — TELEPHONE (OUTPATIENT)
Dept: INTERNAL MEDICINE | Facility: CLINIC | Age: 40
End: 2025-06-17
Payer: COMMERCIAL

## 2025-06-17 RX ORDER — LISDEXAMFETAMINE DIMESYLATE 50 MG/1
CAPSULE ORAL
Qty: 30 CAPSULE | Refills: 0 | OUTPATIENT
Start: 2025-06-17

## 2025-06-17 NOTE — TELEPHONE ENCOUNTER
Staff contacted patient regarding ADHD follow up for refill, patient was unavailable, staff left a voice message.

## 2025-06-17 NOTE — TELEPHONE ENCOUNTER
Staff refused medication due to absence of appointment, staff attempted to contact patient but patient was unavailable, staff left voice message.

## 2025-06-18 ENCOUNTER — OFFICE VISIT (OUTPATIENT)
Dept: INTERNAL MEDICINE | Facility: CLINIC | Age: 40
End: 2025-06-18
Payer: COMMERCIAL

## 2025-06-18 ENCOUNTER — TELEPHONE (OUTPATIENT)
Dept: HEMATOLOGY/ONCOLOGY | Facility: CLINIC | Age: 40
End: 2025-06-18
Payer: COMMERCIAL

## 2025-06-18 VITALS
WEIGHT: 178.56 LBS | TEMPERATURE: 99 F | BODY MASS INDEX: 29.75 KG/M2 | SYSTOLIC BLOOD PRESSURE: 136 MMHG | HEIGHT: 65 IN | HEART RATE: 76 BPM | DIASTOLIC BLOOD PRESSURE: 73 MMHG | RESPIRATION RATE: 18 BRPM | OXYGEN SATURATION: 100 %

## 2025-06-18 DIAGNOSIS — M21.619 HALLUX ABDUCTOVALGUS WITH BUNIONS, UNSPECIFIED LATERALITY: Primary | ICD-10-CM

## 2025-06-18 DIAGNOSIS — F90.2 ATTENTION DEFICIT HYPERACTIVITY DISORDER (ADHD), COMBINED TYPE: ICD-10-CM

## 2025-06-18 DIAGNOSIS — D50.9 IRON DEFICIENCY ANEMIA, UNSPECIFIED IRON DEFICIENCY ANEMIA TYPE: ICD-10-CM

## 2025-06-18 DIAGNOSIS — K21.9 GASTROESOPHAGEAL REFLUX DISEASE, UNSPECIFIED WHETHER ESOPHAGITIS PRESENT: ICD-10-CM

## 2025-06-18 DIAGNOSIS — Z12.83 SCREENING FOR SKIN CANCER: ICD-10-CM

## 2025-06-18 DIAGNOSIS — M20.42 HAMMER TOES OF BOTH FEET: ICD-10-CM

## 2025-06-18 DIAGNOSIS — Z01.00 ROUTINE EYE EXAM: ICD-10-CM

## 2025-06-18 DIAGNOSIS — M20.10 HALLUX ABDUCTOVALGUS WITH BUNIONS, UNSPECIFIED LATERALITY: Primary | ICD-10-CM

## 2025-06-18 DIAGNOSIS — Z76.0 MEDICATION REFILL: ICD-10-CM

## 2025-06-18 DIAGNOSIS — Z11.1 SCREENING-PULMONARY TB: ICD-10-CM

## 2025-06-18 DIAGNOSIS — M20.41 HAMMER TOES OF BOTH FEET: ICD-10-CM

## 2025-06-18 PROCEDURE — 99999 PR PBB SHADOW E&M-EST. PATIENT-LVL V: CPT | Mod: PBBFAC,,,

## 2025-06-18 RX ORDER — TRAMADOL HYDROCHLORIDE 50 MG/1
50 TABLET, FILM COATED ORAL DAILY PRN
Qty: 14 TABLET | Refills: 0 | Status: SHIPPED | OUTPATIENT
Start: 2025-06-18 | End: 2025-07-02

## 2025-06-18 RX ORDER — LISDEXAMFETAMINE DIMESYLATE 50 MG/1
CAPSULE ORAL
Qty: 30 CAPSULE | Refills: 0 | Status: SHIPPED | OUTPATIENT
Start: 2025-06-18

## 2025-06-18 RX ORDER — PANTOPRAZOLE SODIUM 40 MG/1
40 TABLET, DELAYED RELEASE ORAL DAILY
Qty: 30 TABLET | Refills: 2 | Status: SHIPPED | OUTPATIENT
Start: 2025-06-18 | End: 2025-09-16

## 2025-06-18 NOTE — PROGRESS NOTES
[facility]  Subjective:       Patient ID: Corrine Gan is a 40 y.o. female. She is an established patient of Dr. Willoughby, she is new to me.     History of Present Illness    CHIEF COMPLAINT:  Patient presents today for medication refills and multiple chronic conditions.    MUSCULOSKELETAL - FOOT PAIN:  She has bilateral bunions and hammer toes, right worse than left, with worsening pain and recent exacerbation over the past week. She was evaluated by podiatry approximately 3 years ago who recommended bilateral foot surgery. 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    GASTROINTESTINAL:  Recent endoscopy showed inflammation with negative biopsies. She takes Protonix with variable dosing, sometimes requiring a second dose for severe indigestion or pain. Family history significant for Cavanaugh's esophagus in both parents, Non-Hodgkin's lymphoma in father who recently had 11 inches of colon removed.    ABDOMINAL MASS:  She reports an abdominal mass previously evaluated with ultrasound, leading to possible recommendation for fine needle aspiration biopsy. She experiences intermittent severe pain associated with the mass, which sometimes improves with applied pressure. No specific triggers identified. She questions if the mass could be related to excess skin from her 100-pound weight loss or potentially represent a hernia. The mass is palpable.    ANEMIA:  She is not currently on treatment for anemia. She experiences occasional orthostatic symptoms but denies shortness of breath or other significant anemia-related symptoms. She does not have a menstrual period anymore.     PAST MEDICAL HISTORY:  History notable for 100-pound weight loss from previous weight of 300 lbs and uterine ablation with subsequent cessation of menstrual periods.          Current Outpatient Medications   Medication Instructions    ibuprofen  "(ADVIL,MOTRIN) 600 mg, Oral, Every 6 hours PRN    lisdexamfetamine (VYVANSE) 50 MG capsule TAKE 1 CAPSULE BY MOUTH ONCE DAILY IN THE MORNING.    nystatin (MYCOSTATIN) powder Topical (Top), 2 times daily    pantoprazole (PROTONIX) 40 mg, Oral, Daily    traMADoL (ULTRAM) 50 mg, Oral, Daily PRN, For foot pain     Objective:      Body mass index is 29.72 kg/m².  Vitals:    06/18/25 0804   BP: 136/73   Pulse: 76   Resp: 18   Temp: 98.9 °F (37.2 °C)   TempSrc: Oral   SpO2: 100%   Weight: 81 kg (178 lb 9.2 oz)   Height: 5' 5" (1.651 m)   PainSc:   7   PainLoc: Generalized     Physical Exam  Constitutional:       General: She is not in acute distress.     Appearance: Normal appearance. She is not toxic-appearing.   HENT:      Nose: No congestion or rhinorrhea.   Cardiovascular:      Rate and Rhythm: Normal rate and regular rhythm.      Heart sounds: No murmur heard.  Pulmonary:      Effort: Pulmonary effort is normal. No respiratory distress.      Breath sounds: No wheezing.   Abdominal:      Tenderness: There is no abdominal tenderness. There is no guarding.   Musculoskeletal:      Right foot: Bunion present.      Left foot: Bunion present.      Comments: Hammertoe, 2nd digit bilateral. R>L    Skin:     General: Skin is warm.   Neurological:      Mental Status: She is alert.      Assessment:   Assessment & Plan    IMPRESSION:  - Patient presents with chronic bilateral foot pain due to bunions and hammer toes, right side worse than left.  - Considered non-surgical options for foot pain management due to patient's reluctance for surgery.  - Addressing significant anemia with hematology referral  - Noted presence of soft tissue mass, US suggestive of benign granulomas; deferring fine needle biopsy unless symptoms worsen.    HALLUX VALGUS (BILATERAL):  - Patient reports worsening pain in the right foot due to hallux valgus and bunions over the past week, with the right side being worse than the left.  - Patient reports " painful bunions on the left foot, although less severe compared to the right foot.  - Prescribed tramadol 50 mg for acute exacerbation of chronic foot pain for 2 weeks.  -Discussed limitations in prescribing pain medicine in primary care and need for follow up with specialists to address root cause of pain, including discussion of surgery.   - Referred to Podiatry for evaluation of bunions and hammer toes, Orthopedics for second opinion on foot condition, and Physical therapy for non-surgical options as requested by the patient.     HAMMER TOES (RIGHT FOOT):  - Patient reports hammer toes on the right foot, contributing to pain and discomfort which has been worsening over the past week.  - Patient reports hammer toes on the left foot, contributing to pain and discomfort, which is less severe compared to the right foot.  - Prescribed tramadol 50 mg for acute exacerbation of chronic foot pain for 2 weeks.  -Discussed limitations in prescribing pain medicine in primary care and need for follow up with specialists to address root cause of pain, including discussion of surgery.   - Referred to Podiatry for evaluation of bunions and hammer toes, Orthopedics for second opinion on foot condition, and Physical therapy for non-surgical options as requested by the patient.     GASTRO-ESOPHAGEAL REFLUX DISEASE (GERD):  - Patient reports ongoing indigestion and pain, sometimes requiring more than one dose of Protonix.  - Previous endoscopy showed inflammation, but biopsies were negative.  - Discussed long term effects of PPI use and possible need for follow-up endoscopy and biopsies if symptoms persist  - Continued Protonix 40 mg daily with 2 refills.  - Will reevaluate in 8 weeks.    MASS ON GROIN:  - Patient reports intermittent pain from a mass on the trunk.  - Ultrasound suggests benign granulomas; fine needle biopsy may be warranted.  - Discussed prioritizing this as a lower priority for follow-up due to benign imaging  results.  - Continued Nystatin powder for intertriginous areas.    ANEMIA:  - Patient reports significant anemia, possibly due to malnourishment, with hemoglobin previously at 7.  - Not currently symptomatic despite anemia.  - Referred to Hematology for evaluation and management of significant anemia     FOLLOW-UP AND OTHER:  - Continued Vyvanse 50 mg daily.  - Ordered TB Quantiferon blood test for work requirement.  - Follow up in 3 months.           Hallux abductovalgus with bunions, unspecified laterality  -     Ambulatory referral/consult to Podiatry; Future; Expected date: 06/25/2025  -     Ambulatory referral/consult to Orthopedics; Future; Expected date: 06/25/2025  -     Ambulatory Referral/Consult to Physical Therapy  -     traMADoL (ULTRAM) 50 mg tablet; Take 1 tablet (50 mg total) by mouth daily as needed for Pain. For foot pain  Dispense: 14 tablet; Refill: 0    Medication refill    Attention deficit hyperactivity disorder (ADHD), combined type  -     lisdexamfetamine (VYVANSE) 50 MG capsule; TAKE 1 CAPSULE BY MOUTH ONCE DAILY IN THE MORNING.  Dispense: 30 capsule; Refill: 0    Gastroesophageal reflux disease, unspecified whether esophagitis present  -     pantoprazole (PROTONIX) 40 MG tablet; Take 1 tablet (40 mg total) by mouth once daily.  Dispense: 30 tablet; Refill: 2    Iron deficiency anemia, unspecified iron deficiency anemia type  -     Ambulatory referral/consult to Hematology / Oncology; Future; Expected date: 06/25/2025    Hammer toes of both feet  -     Ambulatory referral/consult to Podiatry; Future; Expected date: 06/25/2025  -     Ambulatory referral/consult to Orthopedics; Future; Expected date: 06/25/2025  -     Ambulatory Referral/Consult to Physical Therapy  -     traMADoL (ULTRAM) 50 mg tablet; Take 1 tablet (50 mg total) by mouth daily as needed for Pain. For foot pain  Dispense: 14 tablet; Refill: 0    Screening-pulmonary TB  -     Quantiferon Gold TB; Future; Expected date:  06/18/2025    Screening for skin cancer  -     Ambulatory referral/consult to Dermatology; Future; Expected date: 06/25/2025    Routine eye exam  -     Ambulatory referral/consult to Ophthalmology; Future; Expected date: 06/25/2025        Health Maintenance   Topic Date Due    Mammogram  Never done    Pneumococcal Vaccines (Age 0-49) (1 of 2 - PCV) Never done    COVID-19 Vaccine (3 - 2024-25 season) 09/01/2024    Hemoglobin A1c (Diabetic Prevention Screening)  04/28/2025    Cervical Cancer Screening  09/19/2026    TETANUS VACCINE  07/29/2027    RSV Vaccine (Age 60+ and Pregnant patients) (1 - 1-dose 75+ series) 03/17/2060    Hepatitis C Screening  Completed    Influenza Vaccine  Completed    HIV Screening  Completed    Lipid Panel  Completed        Follow up in about 3 months (around 9/18/2025). or sooner as needed            This note was generated with the assistance of ambient listening technology. Verbal consent was obtained by the patient and accompanying visitor(s) for the recording of patient appointment to facilitate this note. I attest to having reviewed and edited the generated note for accuracy, though some syntax or spelling errors may persist. Please contact the author of this note for any clarification.                  ADOLFO Alanis, MPH  [facility]  [facility] [facility] [address]  [place] [address]  Phone: [phone]  Fax: [phone]

## 2025-07-01 ENCOUNTER — TELEPHONE (OUTPATIENT)
Dept: PODIATRY | Facility: CLINIC | Age: 40
End: 2025-07-01
Payer: COMMERCIAL

## 2025-07-01 ENCOUNTER — OFFICE VISIT (OUTPATIENT)
Dept: HEMATOLOGY/ONCOLOGY | Facility: CLINIC | Age: 40
End: 2025-07-01
Payer: COMMERCIAL

## 2025-07-01 DIAGNOSIS — D50.0 IRON DEFICIENCY ANEMIA DUE TO CHRONIC BLOOD LOSS: Primary | ICD-10-CM

## 2025-07-01 DIAGNOSIS — D50.9 IRON DEFICIENCY ANEMIA, UNSPECIFIED IRON DEFICIENCY ANEMIA TYPE: ICD-10-CM

## 2025-07-01 PROCEDURE — 1159F MED LIST DOCD IN RCRD: CPT | Mod: CPTII,95,, | Performed by: INTERNAL MEDICINE

## 2025-07-01 RX ORDER — EPINEPHRINE 0.3 MG/.3ML
0.3 INJECTION SUBCUTANEOUS ONCE AS NEEDED
OUTPATIENT
Start: 2025-07-07

## 2025-07-01 RX ORDER — SODIUM CHLORIDE 0.9 % (FLUSH) 0.9 %
10 SYRINGE (ML) INJECTION
OUTPATIENT
Start: 2025-07-07

## 2025-07-01 RX ORDER — HEPARIN 100 UNIT/ML
500 SYRINGE INTRAVENOUS
OUTPATIENT
Start: 2025-07-07

## 2025-07-01 NOTE — PROGRESS NOTES
The patient location is: [place]  The chief complaint leading to consultation is: iron deficiency anemia    Visit type: audiovisual    Face to Face time with patient: 10  30 minutes of total time spent on the encounter, which includes face to face time and non-face to face time preparing to see the patient (eg, review of tests), Obtaining and/or reviewing separately obtained history, Documenting clinical information in the electronic or other health record, Independently interpreting results (not separately reported) and communicating results to the patient/family/caregiver, or Care coordination (not separately reported).         Each patient to whom he or she provides medical services by telemedicine is:  (1) informed of the relationship between the physician and patient and the respective role of any other health care provider with respect to management of the patient; and (2) notified that he or she may decline to receive medical services by telemedicine and may withdraw from such care at any time.    Notes:  see below    Subjective:       Patient ID: Corrine Gan is a 40 y.o. female.    Chief Complaint: Consult    HPI    Long-standing history of anemia, reports precedes start of menstruation. Then iron deficiency likely due to menorrhagia. Completed endometrial ablation around 2019 that stopped cycles. Also history of gastric sleeve surgery that would decrease iron absorption. Reports low iron content diet. In addition recent candidal infection of the upper GI tract that would impair iron utilization. No history of IV iron infusion or transfusion. Needs to have surgery on foot later this year. Also, feeling fatigue with low ferritin and hemoglobin less than 8 grams.    Review of Systems   Constitutional:  Positive for fatigue. Negative for appetite change and unexpected weight change.   HENT:  Negative for mouth sores.    Eyes:  Negative for visual disturbance.   Respiratory:  Positive for shortness of  breath. Negative for cough.    Cardiovascular:  Negative for chest pain.   Gastrointestinal:  Negative for abdominal pain and diarrhea.   Genitourinary:  Negative for frequency.   Musculoskeletal:  Negative for back pain.   Integumentary:  Negative for rash.   Neurological:  Negative for headaches.   Hematological:  Negative for adenopathy.   Psychiatric/Behavioral:  The patient is not nervous/anxious.          Objective:      Physical Exam No exam for telehealth visit    Current Medications[1]   Lab Results   Component Value Date    WBC 7.38 11/25/2024    HGB 7.4 (L) 11/25/2024    HCT 27.3 (L) 11/25/2024    MCV 73 (L) 11/25/2024     (H) 11/25/2024        CMP  Sodium   Date Value Ref Range Status   11/07/2024 138 136 - 145 mmol/L Final     Potassium   Date Value Ref Range Status   11/07/2024 3.7 3.5 - 5.1 mmol/L Final     Chloride   Date Value Ref Range Status   11/07/2024 106 95 - 110 mmol/L Final     CO2   Date Value Ref Range Status   11/07/2024 24 23 - 29 mmol/L Final     Glucose   Date Value Ref Range Status   11/07/2024 82 70 - 110 mg/dL Final     BUN   Date Value Ref Range Status   11/07/2024 21 (H) 6 - 20 mg/dL Final     Creatinine   Date Value Ref Range Status   11/07/2024 0.8 0.5 - 1.4 mg/dL Final     Calcium   Date Value Ref Range Status   11/07/2024 9.6 8.7 - 10.5 mg/dL Final     Total Protein   Date Value Ref Range Status   11/07/2024 7.7 6.0 - 8.4 g/dL Final     Albumin   Date Value Ref Range Status   11/07/2024 3.9 3.5 - 5.2 g/dL Final     Total Bilirubin   Date Value Ref Range Status   11/07/2024 0.3 0.1 - 1.0 mg/dL Final     Comment:     For infants and newborns, interpretation of results should be based  on gestational age, weight and in agreement with clinical  observations.    Premature Infant recommended reference ranges:  Up to 24 hours.............<8.0 mg/dL  Up to 48 hours............<12.0 mg/dL  3-5 days..................<15.0 mg/dL  6-29 days.................<15.0 mg/dL        Alkaline Phosphatase   Date Value Ref Range Status   11/07/2024 78 40 - 150 U/L Final     AST   Date Value Ref Range Status   11/07/2024 22 10 - 40 U/L Final     ALT   Date Value Ref Range Status   11/07/2024 26 10 - 44 U/L Final     Anion Gap   Date Value Ref Range Status   11/07/2024 8 8 - 16 mmol/L Final     eGFR if    Date Value Ref Range Status   04/28/2022 >60 >60 mL/min/1.73 m^2 Final     eGFR if non    Date Value Ref Range Status   04/28/2022 >60 >60 mL/min/1.73 m^2 Final     Comment:     Calculation used to obtain the estimated glomerular filtration  rate (eGFR) is the CKD-EPI equation.             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 Discussed we may need blood transfusion pre-operatively to get to goal hemoglobin for surgery depending on surgery date.        BMT Chart Routing      Follow up with physician 4 months. virtual   Follow up with LEONARD    Provider visit type Benign hem   Infusion scheduling note   needs injectafer scheduled once authorized - will need a phone call to schedule around her work schedule in the ER   Injection scheduling note    Labs CBC, ferritin and iron and TIBC   Scheduling:  Preferred lab:  Lab interval:  labs in 2 months and in 4 months with return visit   Imaging    Pharmacy appointment    Other referrals                        [1]   Current Outpatient Medications   Medication Sig Dispense Refill    ibuprofen (ADVIL,MOTRIN) 600 MG tablet Take 1 tablet (600 mg total) by mouth every 6 (six) hours as needed. 20 tablet 1    lisdexamfetamine (VYVANSE) 50 MG capsule TAKE 1 CAPSULE BY MOUTH ONCE DAILY IN THE MORNING. 30 capsule 0    nystatin (MYCOSTATIN)  powder Apply topically 2 (two) times daily. 60 g 11    pantoprazole (PROTONIX) 40 MG tablet Take 1 tablet (40 mg total) by mouth 2 (two) times daily for 60 days, THEN 1 tablet (40 mg total) once daily. 90 tablet 2    pantoprazole (PROTONIX) 40 MG tablet Take 1 tablet (40 mg total) by mouth once daily. 30 tablet 2    traMADoL (ULTRAM) 50 mg tablet Take 1 tablet (50 mg total) by mouth daily as needed for Pain. For foot pain 14 tablet 0     No current facility-administered medications for this visit.     Facility-Administered Medications Ordered in Other Visits   Medication Dose Route Frequency Provider Last Rate Last Admin    lactated ringers infusion   Intravenous Continuous Jason Guerra DNP        LIDOcaine (PF) 10 mg/ml (1%) injection 10 mg  1 mL Intradermal Once Jason Guerra, DAVID

## 2025-07-01 NOTE — TELEPHONE ENCOUNTER
Called pt and left message per Dr. Acevedo that she is not a surgical provider and didn't want pt to have to pay a co-pay twice but would be happy to evaluate her if she would still like to come to appt

## 2025-07-10 ENCOUNTER — TELEPHONE (OUTPATIENT)
Dept: HEMATOLOGY/ONCOLOGY | Facility: CLINIC | Age: 40
End: 2025-07-10
Payer: COMMERCIAL

## 2025-07-10 NOTE — TELEPHONE ENCOUNTER
----- Message from JOSE Gusman sent at 7/10/2025  9:34 AM CDT -----  Regarding: IV Injectafer  Good morning. Patient's Injectafer was approved. Can you help get her scheduled please?    Thank you.    Edy

## 2025-07-31 ENCOUNTER — HOSPITAL ENCOUNTER (OUTPATIENT)
Facility: HOSPITAL | Age: 40
Discharge: HOME | End: 2025-07-31
Attending: PODIATRIST
Payer: COMMERCIAL

## 2025-07-31 ENCOUNTER — LAB VISIT (OUTPATIENT)
Dept: LAB | Facility: HOSPITAL | Age: 40
End: 2025-07-31
Attending: PODIATRIST
Payer: COMMERCIAL

## 2025-07-31 ENCOUNTER — OFFICE VISIT (OUTPATIENT)
Dept: PODIATRY | Facility: CLINIC | Age: 40
End: 2025-07-31
Payer: COMMERCIAL

## 2025-07-31 VITALS
HEART RATE: 66 BPM | BODY MASS INDEX: 29.75 KG/M2 | DIASTOLIC BLOOD PRESSURE: 83 MMHG | HEIGHT: 65 IN | WEIGHT: 178.56 LBS | SYSTOLIC BLOOD PRESSURE: 131 MMHG

## 2025-07-31 DIAGNOSIS — M20.11 VALGUS DEFORMITY OF BOTH GREAT TOES: ICD-10-CM

## 2025-07-31 DIAGNOSIS — M21.619 HALLUX ABDUCTOVALGUS WITH BUNIONS, UNSPECIFIED LATERALITY: Primary | ICD-10-CM

## 2025-07-31 DIAGNOSIS — Z01.818 PREOP TESTING: ICD-10-CM

## 2025-07-31 DIAGNOSIS — M20.12 VALGUS DEFORMITY OF BOTH GREAT TOES: ICD-10-CM

## 2025-07-31 DIAGNOSIS — Z86.39 HISTORY OF VITAMIN D DEFICIENCY: ICD-10-CM

## 2025-07-31 DIAGNOSIS — M21.70 LOWER LIMB LENGTH DIFFERENCE: ICD-10-CM

## 2025-07-31 DIAGNOSIS — M24.573 EQUINUS CONTRACTURE OF ANKLE: ICD-10-CM

## 2025-07-31 DIAGNOSIS — M20.11 HALLUX ABDUCTOVALGUS, RIGHT: ICD-10-CM

## 2025-07-31 DIAGNOSIS — M20.10 HALLUX ABDUCTOVALGUS WITH BUNIONS, UNSPECIFIED LATERALITY: ICD-10-CM

## 2025-07-31 DIAGNOSIS — M20.42 HAMMER TOES OF BOTH FEET: ICD-10-CM

## 2025-07-31 DIAGNOSIS — D50.0 IRON DEFICIENCY ANEMIA DUE TO CHRONIC BLOOD LOSS: ICD-10-CM

## 2025-07-31 DIAGNOSIS — M20.10 HALLUX ABDUCTOVALGUS WITH BUNIONS, UNSPECIFIED LATERALITY: Primary | ICD-10-CM

## 2025-07-31 DIAGNOSIS — M20.41 HAMMER TOES OF BOTH FEET: ICD-10-CM

## 2025-07-31 DIAGNOSIS — M21.619 HALLUX ABDUCTOVALGUS WITH BUNIONS, UNSPECIFIED LATERALITY: ICD-10-CM

## 2025-07-31 LAB — 25(OH)D3+25(OH)D2 SERPL-MCNC: 19 NG/ML (ref 30–96)

## 2025-07-31 PROCEDURE — 73630 X-RAY EXAM OF FOOT: CPT | Mod: 26,,, | Performed by: RADIOLOGY

## 2025-07-31 PROCEDURE — 73630 X-RAY EXAM OF FOOT: CPT | Mod: TC,50,PN

## 2025-07-31 PROCEDURE — 82306 VITAMIN D 25 HYDROXY: CPT

## 2025-07-31 PROCEDURE — 99999 PR PBB SHADOW E&M-EST. PATIENT-LVL V: CPT | Mod: PBBFAC,,, | Performed by: PODIATRIST

## 2025-07-31 PROCEDURE — 36415 COLL VENOUS BLD VENIPUNCTURE: CPT

## 2025-07-31 RX ORDER — SODIUM CHLORIDE 0.9 % (FLUSH) 0.9 %
10 SYRINGE (ML) INJECTION
Status: SHIPPED | OUTPATIENT
Start: 2025-07-31

## 2025-07-31 RX ORDER — ASPIRIN 325 MG
50000 TABLET, DELAYED RELEASE (ENTERIC COATED) ORAL
Qty: 12 CAPSULE | Refills: 3 | Status: SHIPPED | OUTPATIENT
Start: 2025-07-31

## 2025-07-31 RX ORDER — CEFAZOLIN SODIUM 2 G/50ML
2 SOLUTION INTRAVENOUS
OUTPATIENT
Start: 2025-07-31

## 2025-07-31 NOTE — PROGRESS NOTES
"Subjective:      Patient ID: Corrine Gan is a 40 y.o. female.    Chief Complaint: Foot Pain (right)    Presents today complaining of worsening painful bunions on both feet and hammertoe to the right 2nd toe that she has noticed since she was at least 20 years old.  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  Relates that her pain worsens words in the day with standing or walking.  She routinely wears Nike tennis shoes.  Denies any trauma.  Also complains that her nails are thickened and growing a superior direction on select toes.    2025:  Returns complaining of worsening pain to the right foot which she feels this affecting her right hip and causing her to have sciatica.  States that she is being treated with iron infusions for iron deficiency anemia.  In addition she has had a history of then confirmed for vitamin-D deficiency.  Currently just taking over-the-counter vitamin-D.  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    Vitals:    25 1305   BP: 131/83   Pulse: 66   Weight: 81 kg (178 lb 9.2 oz)   Height: 5' 5" (1.651 m)   PainSc:   7      Past Medical History:   Diagnosis Date    Coronary artery disease     large heart noted on CXR , No problems    Diabetes mellitus     previous and current pregnancy, diet controlled. Gestational DM only    Gestational diabetes     Hypertension     preEclampsia    Pregnancy     x 2; preecalmpsia for second one       Past Surgical History:   Procedure Laterality Date     SECTION      DILATION AND CURETTAGE OF UTERUS N/A 2024    Procedure: DILATION AND CURETTAGE, UTERUS;  Surgeon: Wiedemann, Michael A., MD;  Location: [facility] OR;  Service: OB/GYN;  Laterality: N/A;    ENDOMETRIAL ABLATION N/A 2024    Procedure: ABLATION, ENDOMETRIUM;  Surgeon: Wiedemann, Michael A., MD;  Location: [facility] OR;  Service: OB/GYN;  Laterality: N/A;    ESOPHAGOGASTRODUODENOSCOPY N/A 2025    Procedure: EGD (ESOPHAGOGASTRODUODENOSCOPY);  " Surgeon: Alejandro Hoover MD;  Location: [facility] ENDOSCOPY;  Service: Endoscopy;  Laterality: N/A;  2/3/25- for pc. DBM  2/10 Left detailed vm. SG    gastric sleeve      HYSTEROSCOPY N/A 4/29/2024    Procedure: HYSTEROSCOPY;  Surgeon: Wiedemann, Michael A., MD;  Location: [facility] OR;  Service: OB/GYN;  Laterality: N/A;    TONSILLECTOMY         Family History   Problem Relation Name Age of Onset    Diabetes Mother Mom     Hypertension Father Cj     Diabetes Maternal Grandmother Darkene        Social History     Socioeconomic History    Marital status: Single   Tobacco Use    Smoking status: Every Day     Types: Vaping with nicotine     Start date: 03/2022    Smokeless tobacco: Never    Tobacco comments:     PATIENT VAPES' 1 TIME WEEKLY AS OF 11/7/2024   Substance and Sexual Activity    Alcohol use: Yes     Comment: couple drinks daily    Drug use: No    Sexual activity: Yes     Social Drivers of Health     Financial Resource Strain: Low Risk  (12/16/2024)    Overall Financial Resource Strain (CARDIA)     Difficulty of Paying Living Expenses: Not very hard   Food Insecurity: Food Insecurity Present (12/16/2024)    Hunger Vital Sign     Worried About Running Out of Food in the Last Year: Sometimes true     Ran Out of Food in the Last Year: Never true   Transportation Needs: Patient Declined (5/17/2023)    PRAPARE - Transportation     Lack of Transportation (Medical): Patient declined     Lack of Transportation (Non-Medical): Patient declined   Physical Activity: Insufficiently Active (12/16/2024)    Exercise Vital Sign     Days of Exercise per Week: 2 days     Minutes of Exercise per Session: 30 min   Stress: Stress Concern Present (12/16/2024)    [employer] - Occupational Stress Questionnaire     Feeling of Stress : To some extent   Housing Stability: Unknown (12/16/2024)    Housing Stability Vital Sign     Unable to Pay for Housing in the Last Year: No       Current Outpatient Medications    Medication Sig Dispense Refill    ibuprofen (ADVIL,MOTRIN) 600 MG tablet Take 1 tablet (600 mg total) by mouth every 6 (six) hours as needed. 20 tablet 1    lisdexamfetamine (VYVANSE) 50 MG capsule TAKE 1 CAPSULE BY MOUTH ONCE DAILY IN THE MORNING. 30 capsule 0    nystatin (MYCOSTATIN) powder Apply topically 2 (two) times daily. 60 g 11    pantoprazole (PROTONIX) 40 MG tablet Take 1 tablet (40 mg total) by mouth 2 (two) times daily for 60 days, THEN 1 tablet (40 mg total) once daily. 90 tablet 2    pantoprazole (PROTONIX) 40 MG tablet Take 1 tablet (40 mg total) by mouth once daily. 30 tablet 2    cholecalciferol, vitamin D3, 1,250 mcg (50,000 unit) capsule Take 1 capsule (50,000 Units total) by mouth every 7 days. 12 capsule 3     Current Facility-Administered Medications   Medication Dose Route Frequency Provider Last Rate Last Admin    sodium chloride 0.9% flush 10 mL  10 mL Intravenous PRN Chris Fuller DPM         Facility-Administered Medications Ordered in Other Visits   Medication Dose Route Frequency Provider Last Rate Last Admin    lactated ringers infusion   Intravenous Continuous Jason Guerra DNP        LIDOcaine (PF) 10 mg/ml (1%) injection 10 mg  1 mL Intradermal Once Jason Guerra DNP           Review of patient's allergies indicates:  No Known Allergies        Review of Systems   Constitutional: Negative for chills, fever and malaise/fatigue.   HENT:  Negative for congestion and hearing loss.    Cardiovascular:  Negative for chest pain, claudication and leg swelling.   Respiratory:  Negative for cough and shortness of breath.    Skin:  Positive for nail changes. Negative for color change and itching.   Musculoskeletal:  Positive for back pain. Negative for joint pain, muscle cramps and muscle weakness.   Gastrointestinal:  Negative for nausea and vomiting.   Neurological:  Negative for numbness, paresthesias and weakness.   Psychiatric/Behavioral:  Negative for altered mental  status.            Objective:      Physical Exam  Constitutional:       General: She is not in acute distress.     Appearance: Normal appearance. She is not ill-appearing.   Cardiovascular:      Pulses:           Dorsalis pedis pulses are 2+ on the right side and 2+ on the left side.        Posterior tibial pulses are 2+ on the right side and 2+ on the left side.      Comments: No lower extremity edema bilateral.  Skin temp is warm to foot bilateral.  Musculoskeletal:      Comments: Hallux abductovalgus bilateral foot under Lapping the 2nd toe right greater than left.  Note 1st ray dorsal excursion greater 1 cm with instability noted bilateral.  Rigid flexion contracture of the right 2nd toe PIPJ with overall elongated appearance of the toe.    Overall rectus appearing foot type.  Note reduced range of motion to the ankle with less than 0° of dorsiflexion with the knee extended that reduces to greater than 10 degrees of dorsiflexion with the knee flexed.    No pain with range of motion or manual muscle strength testing bilateral foot ankle.  One MTP range of motion is noted to be within normal limits however reduced with 1st ray loading.  No pain during range of motion of the 1 MTP bilateral.  Negative Lachman test right 2nd toe MTP.   Skin:     General: Skin is warm.      Capillary Refill: Capillary refill takes less than 2 seconds.      Findings: No ecchymosis or erythema.      Nails: There is no clubbing.      Comments: Diffuse plantar tyloma sub met 2 and 3 bilateral foot.   Neurological:      Mental Status: She is alert and oriented to person, place, and time.      Sensory: Sensation is intact.      Motor: Motor function is intact.                     Assessment:       Encounter Diagnoses   Name Primary?    Hallux abductovalgus with bunions, unspecified laterality Yes    Hammer toes of both feet     Equinus contracture of ankle     History of vitamin D deficiency     Iron deficiency anemia due to chronic blood  loss     Preop testing     Valgus deformity of both great toes     Lower limb length difference     Hallux abductovalgus, right          Plan:       Corrine was seen today for foot pain.    Diagnoses and all orders for this visit:    Hallux abductovalgus with bunions, unspecified laterality  -     Ambulatory referral/consult to Podiatry  -     X-Ray Foot Complete 3 view Bilateral; Future  -     Ambulatory referral/consult to Internal Medicine; Future    Hammer toes of both feet  -     Ambulatory referral/consult to Podiatry  -     Ambulatory referral/consult to Internal Medicine; Future  -     Case Request Operating Room: RECESSION, GASTROCNEMIUS, ENDOSCOPIC, BUNIONECTOMY, LAPIDUS, CORRECTION, HAMMER TOE  -     Full code; Standing  -     Place in Outpatient; Standing  -     Insert peripheral IV; Standing  -     Verify surgical site; Standing  -     Verify informed consent; Standing  -     Chlorhexidine (CHG) 2% Wipes; Standing    Equinus contracture of ankle  -     Case Request Operating Room: RECESSION, GASTROCNEMIUS, ENDOSCOPIC, BUNIONECTOMY, LAPIDUS, CORRECTION, HAMMER TOE  -     Full code; Standing  -     Place in Outpatient; Standing  -     Insert peripheral IV; Standing  -     Verify surgical site; Standing  -     Verify informed consent; Standing  -     Chlorhexidine (CHG) 2% Wipes; Standing    History of vitamin D deficiency  -     cholecalciferol, vitamin D3, 1,250 mcg (50,000 unit) capsule; Take 1 capsule (50,000 Units total) by mouth every 7 days.  -     Vitamin D 25 hydroxy; Future    Iron deficiency anemia due to chronic blood loss    Preop testing  -     Ambulatory referral/consult to Internal Medicine; Future    Valgus deformity of both great toes    Lower limb length difference  -     X-Ray Hip to Ankle; Future    Hallux abductovalgus, right  -     Case Request Operating Room: RECESSION, GASTROCNEMIUS, ENDOSCOPIC, BUNIONECTOMY, LAPIDUS, CORRECTION, HAMMER TOE  -     Full code; Standing  -     Place in  Outpatient; Standing  -     Insert peripheral IV; Standing  -     Verify surgical site; Standing  -     Verify informed consent; Standing  -     Chlorhexidine (CHG) 2% Wipes; Standing    Other orders  -     sodium chloride 0.9% flush 10 mL  -     cefazolin (ANCEF) 2 gram in dextrose 5% 50 mL IVPB (premix)      I counseled the patient on her conditions, their implications and medical management.    Follow up bilateral weight-bearing foot x-ray completed today and reviewed with the patient.  Right foot note 1-2 intermetatarsal angle of 14°, hallux valgus angle of 35°, tibial sesamoid position of 4 with rounding of the lateral aspect of the 1st metatarsal head.  The base of the proximal phalanx is riding on the lateral aspect of the 1st metatarsal head.  Met adductus angle of 15°.  Os navicularis present.  Normal calcaneal inclination angle in the lateral projection.  Mild elevation of the 1st ray on the lateral projection with mild gapping at the plantar aspect of the 1st tarsometatarsal joint.  Flexion contracture at the 2nd toe PIPJ confirmed.    We discussed continued conservative care versus surgical intervention consisting of gastrocnemius recession right leg, arthrodesis of the 1st tarsometatarsal joint for realignment of the 1st ray and capsulotomy of the 1st metatarsophalangeal joint with possible Judson osteotomy.  We also discussed PIPJ arthrodesis of the 2nd toe.  Risks, benefits anticipated postoperative course discussed in great detail.  No guarantees were given or implied.  Patient elected proceed surgical intervention outlined above.  We discussed having her anemia corrected prior surgical intervention therefore deferred to her PCP.  Pending iron infusion.  Updated vitamin-D ordered.  Initiate patient on cholecalciferol 14682 units weekly for now and adjust if needed to twice weekly.    We discussed the need for complete smoking cessation and avoiding tobacco products at all times.  Patient states that  she smokes once in awhile when she drinks.    Ordered hip to ankle x-ray to assess for significant lower limb length difference contributing to her back pain/right hip pain.    RTC 1 month prior to scheduled surgical intervention of 12/03/2025.    Assisted by Naeem Collazo DPM PGY 3    A portion of this note was generated by voice recognition software and may contain spelling and grammar errors.      .

## 2025-08-01 ENCOUNTER — INFUSION (OUTPATIENT)
Dept: INFUSION THERAPY | Facility: HOSPITAL | Age: 40
End: 2025-08-01
Payer: COMMERCIAL

## 2025-08-01 ENCOUNTER — PATIENT MESSAGE (OUTPATIENT)
Dept: PODIATRY | Facility: CLINIC | Age: 40
End: 2025-08-01
Payer: COMMERCIAL

## 2025-08-01 VITALS
RESPIRATION RATE: 18 BRPM | TEMPERATURE: 98 F | HEART RATE: 65 BPM | DIASTOLIC BLOOD PRESSURE: 74 MMHG | SYSTOLIC BLOOD PRESSURE: 126 MMHG | OXYGEN SATURATION: 100 %

## 2025-08-01 DIAGNOSIS — E55.9 VITAMIN D DEFICIENCY: Primary | ICD-10-CM

## 2025-08-01 DIAGNOSIS — D50.0 IRON DEFICIENCY ANEMIA DUE TO CHRONIC BLOOD LOSS: Primary | ICD-10-CM

## 2025-08-01 PROCEDURE — 96365 THER/PROPH/DIAG IV INF INIT: CPT

## 2025-08-01 PROCEDURE — 25000003 PHARM REV CODE 250: Performed by: INTERNAL MEDICINE

## 2025-08-01 RX ORDER — HEPARIN 100 UNIT/ML
500 SYRINGE INTRAVENOUS
OUTPATIENT
Start: 2025-08-08

## 2025-08-01 RX ORDER — HEPARIN 100 UNIT/ML
500 SYRINGE INTRAVENOUS
Status: DISCONTINUED | OUTPATIENT
Start: 2025-08-01 | End: 2025-08-01 | Stop reason: HOSPADM

## 2025-08-01 RX ORDER — EPINEPHRINE 0.3 MG/.3ML
0.3 INJECTION SUBCUTANEOUS ONCE AS NEEDED
OUTPATIENT
Start: 2025-08-08

## 2025-08-01 RX ORDER — SODIUM CHLORIDE 0.9 % (FLUSH) 0.9 %
10 SYRINGE (ML) INJECTION
Status: DISCONTINUED | OUTPATIENT
Start: 2025-08-01 | End: 2025-08-01 | Stop reason: HOSPADM

## 2025-08-01 RX ORDER — SODIUM CHLORIDE 0.9 % (FLUSH) 0.9 %
10 SYRINGE (ML) INJECTION
OUTPATIENT
Start: 2025-08-08

## 2025-08-01 RX ADMIN — SODIUM CHLORIDE: 9 INJECTION, SOLUTION INTRAVENOUS at 03:08

## 2025-08-01 NOTE — PLAN OF CARE
1645 Patient tolerated first dose of injectafer. No s/s of a reaction and no complaints after 30 min post infusion observation time. PIV removed and catheter tip intact. AVS was declined and patient discharged.

## 2025-08-01 NOTE — PLAN OF CARE
1537 Patient is here for first injectafer infusion. Labs, meds and hx reviewed. PIV inserted and NS started. Oriented patient to the unit and let her know that first dose requires 30 minute observation time. [place] and snack offered.

## 2025-08-04 ENCOUNTER — PATIENT MESSAGE (OUTPATIENT)
Dept: INFUSION THERAPY | Facility: HOSPITAL | Age: 40
End: 2025-08-04
Payer: COMMERCIAL

## 2025-08-04 ENCOUNTER — OFFICE VISIT (OUTPATIENT)
Dept: OPTOMETRY | Facility: CLINIC | Age: 40
End: 2025-08-04
Payer: COMMERCIAL

## 2025-08-04 ENCOUNTER — TELEPHONE (OUTPATIENT)
Dept: HEMATOLOGY/ONCOLOGY | Facility: CLINIC | Age: 40
End: 2025-08-04
Payer: COMMERCIAL

## 2025-08-04 DIAGNOSIS — F90.2 ATTENTION DEFICIT HYPERACTIVITY DISORDER (ADHD), COMBINED TYPE: ICD-10-CM

## 2025-08-04 DIAGNOSIS — H52.03 HYPEROPIA OF BOTH EYES: Primary | ICD-10-CM

## 2025-08-04 DIAGNOSIS — Z46.0 FITTING AND ADJUSTMENT OF SPECTACLES AND CONTACT LENSES: Primary | ICD-10-CM

## 2025-08-04 DIAGNOSIS — Z97.3 WEARS CONTACT LENSES: ICD-10-CM

## 2025-08-04 PROCEDURE — 92004 COMPRE OPH EXAM NEW PT 1/>: CPT | Mod: ,,, | Performed by: OPTOMETRIST

## 2025-08-04 PROCEDURE — 92015 DETERMINE REFRACTIVE STATE: CPT | Mod: ,,, | Performed by: OPTOMETRIST

## 2025-08-04 PROCEDURE — 99499 UNLISTED E&M SERVICE: CPT | Mod: S$GLB,,, | Performed by: OPTOMETRIST

## 2025-08-04 PROCEDURE — 99999 PR PBB SHADOW E&M-EST. PATIENT-LVL II: CPT | Mod: PBBFAC,,, | Performed by: OPTOMETRIST

## 2025-08-04 PROCEDURE — 92310 CONTACT LENS FITTING OU: CPT | Mod: CSM,,, | Performed by: OPTOMETRIST

## 2025-08-04 RX ORDER — LISDEXAMFETAMINE DIMESYLATE 50 MG/1
CAPSULE ORAL
Qty: 30 CAPSULE | Refills: 0 | Status: SHIPPED | OUTPATIENT
Start: 2025-08-04

## 2025-08-04 NOTE — TELEPHONE ENCOUNTER
Copied from CRM #[id number]. Topic: Appointments - Appointment Access  >> Aug 4, 2025  7:27 AM Girish wrote:  Patient missed appointment on July 25 and needs to reschedule appointment.  Please contact pt

## 2025-08-05 ENCOUNTER — HOSPITAL ENCOUNTER (OUTPATIENT)
Dept: RADIOLOGY | Facility: HOSPITAL | Age: 40
Discharge: HOME | End: 2025-08-05
Attending: PODIATRIST
Payer: COMMERCIAL

## 2025-08-05 DIAGNOSIS — M21.70 LOWER LIMB LENGTH DIFFERENCE: ICD-10-CM

## 2025-08-05 PROCEDURE — 77073 BONE LENGTH STUDIES: CPT | Mod: 26,,, | Performed by: RADIOLOGY

## 2025-08-05 PROCEDURE — 77073 BONE LENGTH STUDIES: CPT | Mod: TC

## 2025-08-05 NOTE — PROGRESS NOTES
"HPI    Pt is here today for ocular health examination. Denies pain/discomfort.   States that strab OD has become worse. Patient would like to update   glasses and cl rx. States that she has not worn cl for about 10 years.  DLS: 3+ Years Ago  (+)Flashes: "before passing out"  (+)Floaters: "before passing out"  (+)Diplopia   (-)Headaches   (-)Itching   (-)Tearing  (-)Burning  (-)Dryness   (-)Photophobia  (+)Glare   (+)Blurred VA  Past Eye Sx: (-)  Eye Meds: (-)   Last edited by Vannesa Godinez, OD on 8/4/2025 10:08 AM.            Assessment /Plan     For exam results, see Encounter Report.    Hyperopia of both eyes    Wears contact lenses      1.   Eyeglass Final Rx       Eyeglass Final Rx         Sphere Cylinder Axis    Right +1.00 +0.75 140    Left +0.50 +0.50 085      Type: SVL    Expiration Date: 8/4/2026                   2. Updated CL Rx. Initially good comfort and vision. Given CL trials to take home. If happy with comfort and vision of trial lenses, may order annual supply. If issues arise, RTC for CL f/u. Reviewed proper CL care and hygiene. Monitor yearly unless changes noted sooner.      Contact Lens Current Rx       Current Contact Lens Rx (Trial Lens, Dispensed)         Brand Base Curve Diameter Sphere Cylinder Centration Movement    Right Precision1 8.3 14.2 +1.50 Sphere Well-centered Adequate    Left Precision1 8.3 14.2 +0.75 Sphere Well-centered Adequate                   RTC in 1 year for annual eye exam unless changes noted sooner.    "

## 2025-08-08 ENCOUNTER — INFUSION (OUTPATIENT)
Dept: INFUSION THERAPY | Facility: HOSPITAL | Age: 40
End: 2025-08-08
Payer: COMMERCIAL

## 2025-08-08 ENCOUNTER — PATIENT MESSAGE (OUTPATIENT)
Dept: INTERNAL MEDICINE | Facility: CLINIC | Age: 40
End: 2025-08-08
Payer: COMMERCIAL

## 2025-08-08 VITALS
SYSTOLIC BLOOD PRESSURE: 138 MMHG | OXYGEN SATURATION: 100 % | TEMPERATURE: 98 F | DIASTOLIC BLOOD PRESSURE: 63 MMHG | HEART RATE: 68 BPM | RESPIRATION RATE: 18 BRPM

## 2025-08-08 DIAGNOSIS — D50.0 IRON DEFICIENCY ANEMIA DUE TO CHRONIC BLOOD LOSS: Primary | ICD-10-CM

## 2025-08-08 LAB
ABSOLUTE EOSINOPHIL (OHS): 0.18 K/UL
ABSOLUTE MONOCYTE (OHS): 0.51 K/UL (ref 0.3–1)
ABSOLUTE NEUTROPHIL COUNT (OHS): 4.75 K/UL (ref 1.8–7.7)
ANISOCYTOSIS BLD QL SMEAR: SLIGHT
BASOPHILS # BLD AUTO: 0.03 K/UL
BASOPHILS NFR BLD AUTO: 0.4 %
ERYTHROCYTE [DISTWIDTH] IN BLOOD BY AUTOMATED COUNT: 23.3 % (ref 11.5–14.5)
HCT VFR BLD AUTO: 29.2 % (ref 37–48.5)
HGB BLD-MCNC: 8.2 GM/DL (ref 12–16)
IMM GRANULOCYTES # BLD AUTO: 0.05 K/UL (ref 0–0.04)
IMM GRANULOCYTES NFR BLD AUTO: 0.7 % (ref 0–0.5)
LYMPHOCYTES # BLD AUTO: 1.77 K/UL (ref 1–4.8)
MCH RBC QN AUTO: 22.1 PG (ref 27–31)
MCHC RBC AUTO-ENTMCNC: 28.1 G/DL (ref 32–36)
MCV RBC AUTO: 79 FL (ref 82–98)
NUCLEATED RBC (/100WBC) (OHS): 0 /100 WBC
PLATELET # BLD AUTO: 492 K/UL (ref 150–450)
PLATELET BLD QL SMEAR: NORMAL
PMV BLD AUTO: 8.6 FL (ref 9.2–12.9)
POIKILOCYTOSIS BLD QL SMEAR: SLIGHT
POLYCHROMASIA BLD QL SMEAR: NORMAL
RBC # BLD AUTO: 3.71 M/UL (ref 4–5.4)
RELATIVE EOSINOPHIL (OHS): 2.5 %
RELATIVE LYMPHOCYTE (OHS): 24.3 % (ref 18–48)
RELATIVE MONOCYTE (OHS): 7 % (ref 4–15)
RELATIVE NEUTROPHIL (OHS): 65.1 % (ref 38–73)
SPHEROCYTES BLD QL SMEAR: NORMAL
WBC # BLD AUTO: 7.29 K/UL (ref 3.9–12.7)

## 2025-08-08 PROCEDURE — 63600175 PHARM REV CODE 636 W HCPCS: Mod: JZ,TB | Performed by: INTERNAL MEDICINE

## 2025-08-08 PROCEDURE — 85025 COMPLETE CBC W/AUTO DIFF WBC: CPT

## 2025-08-08 PROCEDURE — 96365 THER/PROPH/DIAG IV INF INIT: CPT

## 2025-08-08 PROCEDURE — 25000003 PHARM REV CODE 250: Performed by: INTERNAL MEDICINE

## 2025-08-08 RX ORDER — SODIUM CHLORIDE 0.9 % (FLUSH) 0.9 %
10 SYRINGE (ML) INJECTION
OUTPATIENT
Start: 2025-08-08

## 2025-08-08 RX ORDER — HEPARIN 100 UNIT/ML
500 SYRINGE INTRAVENOUS
OUTPATIENT
Start: 2025-08-08

## 2025-08-08 RX ORDER — EPINEPHRINE 0.3 MG/.3ML
0.3 INJECTION SUBCUTANEOUS ONCE AS NEEDED
OUTPATIENT
Start: 2025-08-08

## 2025-08-08 RX ADMIN — FERRIC CARBOXYMALTOSE INJECTION 750 MG: 50 INJECTION, SOLUTION INTRAVENOUS at 08:08

## 2025-08-08 NOTE — PLAN OF CARE
"Pt arrived to the Infusion unit for maintenance iron infusion (2/2). Pt is awake, alert, and oriented x4. Ambulates independently with steady gait. Pt reports recent syncopal episodes and chronic fatigue. Pt states "my hemoglobin has been decreasing with no known cause." Labs reviewed. PIV 22g in (L) AC. CBC collected: Hgb 8.2. Pt reports her arm felt heavy hours after completion of initial iron infusion. Pt unsure if its from iron infusion. Pt consents to plan of care for today. Pt administered Injectafer 750 mg in 0.9% 265 mL over 30 minutes.     After 10-15 minutes of starting iron infusion, pt reports numbness/tingling in arm and hand. Injectafer stopped, Normal saline currently infusing with improvement of symptoms. Vitals stable: /63, HR 50s-60s. No acute distress noted. Dr. Sales & Dr. Oquendo notified.    Pharmacy reports symptoms are common side effects to medicine and to decrease rate of infusion with fluids infusing concurrently. Infusion rate @ 265mL/hr over 1 hour with NS infusing concurrently. Pt tolerated infusion with no reactions reported. Pt monitored for 30 mins post infusion. Vitals reassessed and stable. Pt verbalizes no additional needs and instructed to report to ER if symptoms persist. Pt discharged in no acute distress.      Problem: Anemia  Goal: Anemia Symptom Improvement  Outcome: Progressing     Problem: Fatigue  Goal: Improved Activity Tolerance  Outcome: Progressing     "

## 2025-08-11 ENCOUNTER — PATIENT MESSAGE (OUTPATIENT)
Dept: OPTOMETRY | Facility: CLINIC | Age: 40
End: 2025-08-11
Payer: COMMERCIAL

## 2025-08-14 ENCOUNTER — PATIENT MESSAGE (OUTPATIENT)
Dept: PODIATRY | Facility: CLINIC | Age: 40
End: 2025-08-14
Payer: COMMERCIAL

## 2025-08-17 ENCOUNTER — HOSPITAL ENCOUNTER (EMERGENCY)
Facility: HOSPITAL | Age: 40
Discharge: HOME | End: 2025-08-17
Attending: EMERGENCY MEDICINE
Payer: COMMERCIAL

## 2025-08-17 VITALS
TEMPERATURE: 99 F | SYSTOLIC BLOOD PRESSURE: 129 MMHG | RESPIRATION RATE: 14 BRPM | DIASTOLIC BLOOD PRESSURE: 80 MMHG | HEART RATE: 66 BPM | OXYGEN SATURATION: 98 %

## 2025-08-17 DIAGNOSIS — M54.16 LUMBAR RADICULOPATHY: Primary | ICD-10-CM

## 2025-08-17 LAB
ALBUMIN SERPL BCP-MCNC: 3.8 G/DL (ref 3.5–5.2)
ALP SERPL-CCNC: 83 UNIT/L (ref 40–150)
ALT SERPL W/O P-5'-P-CCNC: 21 UNIT/L (ref 10–44)
ANION GAP (OHS): 10 MMOL/L (ref 8–16)
AST SERPL-CCNC: 26 UNIT/L (ref 11–45)
B-HCG UR QL: NEGATIVE
BILIRUB SERPL-MCNC: 0.2 MG/DL (ref 0.1–1)
BILIRUB UR QL STRIP.AUTO: NEGATIVE
BUN SERPL-MCNC: 20 MG/DL (ref 6–20)
CALCIUM SERPL-MCNC: 9 MG/DL (ref 8.7–10.5)
CHLORIDE SERPL-SCNC: 109 MMOL/L (ref 95–110)
CLARITY UR: CLEAR
CO2 SERPL-SCNC: 21 MMOL/L (ref 23–29)
COLOR UR AUTO: YELLOW
CREAT SERPL-MCNC: 0.6 MG/DL (ref 0.5–1.4)
CTP QC/QA: YES
ERYTHROCYTE [DISTWIDTH] IN BLOOD BY AUTOMATED COUNT: 30.3 % (ref 11.5–14.5)
GFR SERPLBLD CREATININE-BSD FMLA CKD-EPI: >60 ML/MIN/1.73/M2
GLUCOSE SERPL-MCNC: 83 MG/DL (ref 70–110)
GLUCOSE UR QL STRIP: NEGATIVE
HCT VFR BLD AUTO: 37.9 % (ref 37–48.5)
HGB BLD-MCNC: 10.7 GM/DL (ref 12–16)
HGB UR QL STRIP: NEGATIVE
KETONES UR QL STRIP: NEGATIVE
LEUKOCYTE ESTERASE UR QL STRIP: NEGATIVE
MCH RBC QN AUTO: 24.4 PG (ref 27–31)
MCHC RBC AUTO-ENTMCNC: 28.2 G/DL (ref 32–36)
MCV RBC AUTO: 87 FL (ref 82–98)
NITRITE UR QL STRIP: NEGATIVE
NUCLEATED RBC (/100WBC) (OHS): 0 /100 WBC
OVALOCYTES BLD QL SMEAR: ABNORMAL
PH UR STRIP: 8 [PH]
PLATELET # BLD AUTO: 605 K/UL (ref 150–450)
PLATELET BLD QL SMEAR: ABNORMAL
PMV BLD AUTO: 9.6 FL (ref 9.2–12.9)
POIKILOCYTOSIS BLD QL SMEAR: SLIGHT
POTASSIUM SERPL-SCNC: 3.9 MMOL/L (ref 3.5–5.1)
PROT SERPL-MCNC: 7.4 GM/DL (ref 6–8.4)
PROT UR QL STRIP: NEGATIVE
RBC # BLD AUTO: 4.38 M/UL (ref 4–5.4)
SODIUM SERPL-SCNC: 140 MMOL/L (ref 136–145)
SP GR UR STRIP: 1.02
SPHEROCYTES BLD QL SMEAR: ABNORMAL
UROBILINOGEN UR STRIP-ACNC: NEGATIVE EU/DL
WBC # BLD AUTO: 4.44 K/UL (ref 3.9–12.7)

## 2025-08-17 PROCEDURE — 81025 URINE PREGNANCY TEST: CPT | Performed by: EMERGENCY MEDICINE

## 2025-08-17 PROCEDURE — 80053 COMPREHEN METABOLIC PANEL: CPT | Performed by: EMERGENCY MEDICINE

## 2025-08-17 PROCEDURE — 96374 THER/PROPH/DIAG INJ IV PUSH: CPT

## 2025-08-17 PROCEDURE — 63600175 PHARM REV CODE 636 W HCPCS: Performed by: EMERGENCY MEDICINE

## 2025-08-17 PROCEDURE — 25000003 PHARM REV CODE 250: Performed by: EMERGENCY MEDICINE

## 2025-08-17 PROCEDURE — 81003 URINALYSIS AUTO W/O SCOPE: CPT | Performed by: EMERGENCY MEDICINE

## 2025-08-17 PROCEDURE — 85025 COMPLETE CBC W/AUTO DIFF WBC: CPT | Performed by: EMERGENCY MEDICINE

## 2025-08-17 PROCEDURE — 96375 TX/PRO/DX INJ NEW DRUG ADDON: CPT

## 2025-08-17 PROCEDURE — 99285 EMERGENCY DEPT VISIT HI MDM: CPT | Mod: 25

## 2025-08-17 RX ORDER — METHOCARBAMOL 500 MG/1
1000 TABLET, FILM COATED ORAL 3 TIMES DAILY
Qty: 60 TABLET | Refills: 0 | Status: SHIPPED | OUTPATIENT
Start: 2025-08-17 | End: 2025-08-27

## 2025-08-17 RX ORDER — GABAPENTIN 300 MG/1
600 CAPSULE ORAL 2 TIMES DAILY
Qty: 40 CAPSULE
Start: 2025-08-17 | End: 2025-08-27

## 2025-08-17 RX ORDER — DEXAMETHASONE SODIUM PHOSPHATE 4 MG/ML
8 INJECTION, SOLUTION INTRA-ARTICULAR; INTRALESIONAL; INTRAMUSCULAR; INTRAVENOUS; SOFT TISSUE
Status: COMPLETED | OUTPATIENT
Start: 2025-08-17 | End: 2025-08-17

## 2025-08-17 RX ORDER — METHOCARBAMOL 500 MG/1
1000 TABLET, FILM COATED ORAL
Status: COMPLETED | OUTPATIENT
Start: 2025-08-17 | End: 2025-08-17

## 2025-08-17 RX ORDER — GABAPENTIN 300 MG/1
300 CAPSULE ORAL ONCE
Status: DISCONTINUED | OUTPATIENT
Start: 2025-08-17 | End: 2025-08-17

## 2025-08-17 RX ORDER — ONDANSETRON HYDROCHLORIDE 2 MG/ML
4 INJECTION, SOLUTION INTRAVENOUS
Status: COMPLETED | OUTPATIENT
Start: 2025-08-17 | End: 2025-08-17

## 2025-08-17 RX ORDER — MORPHINE SULFATE 4 MG/ML
4 INJECTION, SOLUTION INTRAMUSCULAR; INTRAVENOUS
Refills: 0 | Status: COMPLETED | OUTPATIENT
Start: 2025-08-17 | End: 2025-08-17

## 2025-08-17 RX ORDER — POLYETHYLENE GLYCOL 3350 17 G/17G
17 POWDER, FOR SOLUTION ORAL DAILY
Qty: 14 EACH | Refills: 0 | Status: SHIPPED | OUTPATIENT
Start: 2025-08-17 | End: 2025-08-17

## 2025-08-17 RX ORDER — OXYCODONE HYDROCHLORIDE 5 MG/1
5 TABLET ORAL
Refills: 0 | Status: COMPLETED | OUTPATIENT
Start: 2025-08-17 | End: 2025-08-17

## 2025-08-17 RX ORDER — POLYETHYLENE GLYCOL 3350 17 G/17G
POWDER, FOR SOLUTION ORAL
Qty: 30 EACH | Refills: 1 | Status: SHIPPED | OUTPATIENT
Start: 2025-08-17

## 2025-08-17 RX ORDER — OXYCODONE AND ACETAMINOPHEN 5; 325 MG/1; MG/1
1 TABLET ORAL EVERY 6 HOURS PRN
Qty: 10 TABLET | Refills: 0 | Status: SHIPPED | OUTPATIENT
Start: 2025-08-17

## 2025-08-17 RX ORDER — GABAPENTIN 300 MG/1
300 CAPSULE ORAL NIGHTLY
Qty: 30 CAPSULE | Refills: 0 | Status: SHIPPED | OUTPATIENT
Start: 2025-08-17 | End: 2025-08-17

## 2025-08-17 RX ORDER — DIAZEPAM 5 MG/1
5 TABLET ORAL
Status: COMPLETED | OUTPATIENT
Start: 2025-08-17 | End: 2025-08-17

## 2025-08-17 RX ORDER — GABAPENTIN 300 MG/1
600 CAPSULE ORAL ONCE
Status: COMPLETED | OUTPATIENT
Start: 2025-08-17 | End: 2025-08-17

## 2025-08-17 RX ORDER — KETOROLAC TROMETHAMINE 30 MG/ML
15 INJECTION, SOLUTION INTRAMUSCULAR; INTRAVENOUS
Status: COMPLETED | OUTPATIENT
Start: 2025-08-17 | End: 2025-08-17

## 2025-08-17 RX ADMIN — DIAZEPAM 5 MG: 5 TABLET ORAL at 03:08

## 2025-08-17 RX ADMIN — MORPHINE SULFATE 4 MG: 4 INJECTION, SOLUTION INTRAMUSCULAR; INTRAVENOUS at 03:08

## 2025-08-17 RX ADMIN — DEXAMETHASONE SODIUM PHOSPHATE 8 MG: 4 INJECTION INTRA-ARTICULAR; INTRALESIONAL; INTRAMUSCULAR; INTRAVENOUS; SOFT TISSUE at 03:08

## 2025-08-17 RX ADMIN — GABAPENTIN 600 MG: 300 CAPSULE ORAL at 04:08

## 2025-08-17 RX ADMIN — OXYCODONE HYDROCHLORIDE 5 MG: 5 TABLET ORAL at 07:08

## 2025-08-17 RX ADMIN — KETOROLAC TROMETHAMINE 15 MG: 30 INJECTION, SOLUTION INTRAMUSCULAR; INTRAVENOUS at 03:08

## 2025-08-17 RX ADMIN — METHOCARBAMOL 1000 MG: 500 TABLET ORAL at 07:08

## 2025-08-17 RX ADMIN — ONDANSETRON 4 MG: 2 INJECTION INTRAMUSCULAR; INTRAVENOUS at 03:08

## 2025-08-18 LAB — HOLD SPECIMEN: NORMAL

## 2025-08-22 ENCOUNTER — TELEPHONE (OUTPATIENT)
Dept: NEUROSURGERY | Facility: CLINIC | Age: 40
End: 2025-08-22
Payer: COMMERCIAL

## 2025-08-22 DIAGNOSIS — M54.9 BACK PAIN, UNSPECIFIED BACK LOCATION, UNSPECIFIED BACK PAIN LATERALITY, UNSPECIFIED CHRONICITY: Primary | ICD-10-CM

## 2025-08-27 ENCOUNTER — TELEPHONE (OUTPATIENT)
Dept: NEUROSURGERY | Facility: CLINIC | Age: 40
End: 2025-08-27

## 2025-08-27 ENCOUNTER — OFFICE VISIT (OUTPATIENT)
Dept: NEUROSURGERY | Facility: CLINIC | Age: 40
End: 2025-08-27
Payer: COMMERCIAL

## 2025-08-27 ENCOUNTER — PATIENT MESSAGE (OUTPATIENT)
Dept: OPTOMETRY | Facility: CLINIC | Age: 40
End: 2025-08-27
Payer: COMMERCIAL

## 2025-08-27 ENCOUNTER — HOSPITAL ENCOUNTER (OUTPATIENT)
Facility: HOSPITAL | Age: 40
Discharge: HOME | End: 2025-08-27
Attending: NEUROLOGICAL SURGERY
Payer: COMMERCIAL

## 2025-08-27 VITALS
HEIGHT: 65 IN | WEIGHT: 178.56 LBS | HEART RATE: 70 BPM | BODY MASS INDEX: 29.75 KG/M2 | SYSTOLIC BLOOD PRESSURE: 113 MMHG | DIASTOLIC BLOOD PRESSURE: 70 MMHG

## 2025-08-27 DIAGNOSIS — M53.3 SACROILIAC JOINT DYSFUNCTION OF RIGHT SIDE: Primary | ICD-10-CM

## 2025-08-27 DIAGNOSIS — M54.9 BACK PAIN, UNSPECIFIED BACK LOCATION, UNSPECIFIED BACK PAIN LATERALITY, UNSPECIFIED CHRONICITY: ICD-10-CM

## 2025-08-27 PROCEDURE — 99999 PR PBB SHADOW E&M-EST. PATIENT-LVL IV: CPT | Mod: PBBFAC,,, | Performed by: NEUROLOGICAL SURGERY

## 2025-08-27 PROCEDURE — 99204 OFFICE O/P NEW MOD 45 MIN: CPT | Mod: S$GLB,,, | Performed by: NEUROLOGICAL SURGERY

## 2025-08-27 PROCEDURE — 1159F MED LIST DOCD IN RCRD: CPT | Mod: CPTII,S$GLB,, | Performed by: NEUROLOGICAL SURGERY

## 2025-08-27 PROCEDURE — 3078F DIAST BP <80 MM HG: CPT | Mod: CPTII,S$GLB,, | Performed by: NEUROLOGICAL SURGERY

## 2025-08-27 PROCEDURE — 72082 X-RAY EXAM ENTIRE SPI 2/3 VW: CPT | Mod: 26,,, | Performed by: RADIOLOGY

## 2025-08-27 PROCEDURE — 3074F SYST BP LT 130 MM HG: CPT | Mod: CPTII,S$GLB,, | Performed by: NEUROLOGICAL SURGERY

## 2025-08-27 PROCEDURE — 3008F BODY MASS INDEX DOCD: CPT | Mod: CPTII,S$GLB,, | Performed by: NEUROLOGICAL SURGERY

## 2025-08-27 PROCEDURE — 72082 X-RAY EXAM ENTIRE SPI 2/3 VW: CPT | Mod: TC,PN

## 2025-08-29 ENCOUNTER — HOSPITAL ENCOUNTER (OUTPATIENT)
Dept: RADIOLOGY | Facility: HOSPITAL | Age: 40
Discharge: HOME | End: 2025-08-29
Attending: NEUROLOGICAL SURGERY
Payer: COMMERCIAL

## 2025-08-29 ENCOUNTER — PATIENT OUTREACH (OUTPATIENT)
Dept: ADMINISTRATIVE | Facility: HOSPITAL | Age: 40
End: 2025-08-29
Payer: COMMERCIAL

## 2025-08-29 DIAGNOSIS — R73.03 PRE-DIABETES: Primary | ICD-10-CM

## 2025-09-02 DIAGNOSIS — N94.9 ADNEXAL CYST: ICD-10-CM

## 2025-09-02 DIAGNOSIS — M62.9 NODULE IN MUSCLE: Primary | ICD-10-CM

## 2025-09-04 ENCOUNTER — TELEPHONE (OUTPATIENT)
Dept: OBSTETRICS AND GYNECOLOGY | Facility: CLINIC | Age: 40
End: 2025-09-04
Payer: COMMERCIAL

## 2025-09-05 ENCOUNTER — OFFICE VISIT (OUTPATIENT)
Dept: OBSTETRICS AND GYNECOLOGY | Facility: CLINIC | Age: 40
End: 2025-09-05
Payer: COMMERCIAL

## 2025-09-05 VITALS
DIASTOLIC BLOOD PRESSURE: 82 MMHG | HEIGHT: 65 IN | HEART RATE: 65 BPM | WEIGHT: 187.19 LBS | BODY MASS INDEX: 31.19 KG/M2 | SYSTOLIC BLOOD PRESSURE: 124 MMHG

## 2025-09-05 DIAGNOSIS — Z12.4 PAP SMEAR FOR CERVICAL CANCER SCREENING: ICD-10-CM

## 2025-09-05 DIAGNOSIS — Z72.89 OTHER PROBLEMS RELATED TO LIFESTYLE: ICD-10-CM

## 2025-09-05 DIAGNOSIS — Z01.419 ENCOUNTER FOR ANNUAL ROUTINE GYNECOLOGICAL EXAMINATION: Primary | ICD-10-CM

## 2025-09-05 DIAGNOSIS — N83.201 CYST OF RIGHT OVARY: ICD-10-CM

## 2025-09-05 DIAGNOSIS — Z11.3 SCREENING EXAMINATION FOR STD (SEXUALLY TRANSMITTED DISEASE): ICD-10-CM

## 2025-09-05 PROCEDURE — 99999 PR PBB SHADOW E&M-EST. PATIENT-LVL IV: CPT | Mod: PBBFAC,,, | Performed by: OBSTETRICS & GYNECOLOGY

## (undated) DEVICE — GOWN POLY REINF X-LONG 2XL

## (undated) DEVICE — CONTAINER MULTIPURPOSE/SPECIME

## (undated) DEVICE — PANTIES FEMININE NAPKIN LG/XLG

## (undated) DEVICE — PAD PREP CUFFED NS 24X48IN

## (undated) DEVICE — PAD ABDOMINAL 8X7.5 STERILE

## (undated) DEVICE — DRAPE SURGICAL STERI IRRG PCH

## (undated) DEVICE — DRESSING TELFA N ADH 3X8

## (undated) DEVICE — Device

## (undated) DEVICE — DRAPE UINDERBUT GRAD PCH

## (undated) DEVICE — PACK SURGERY START

## (undated) DEVICE — TUBING SET EXTENSIONS IV 20 SL

## (undated) DEVICE — TOWEL OR DISP STRL BLUE 4/PK

## (undated) DEVICE — COVER OVERHEAD SURG LT BLUE

## (undated) DEVICE — CATH URETHRAL RED 16FR

## (undated) DEVICE — GOWN POLY REINF BRTH SLV LG

## (undated) DEVICE — TRAY VAGINAL WET PREP

## (undated) DEVICE — PAD CNTOUR SUP-ABSRB POSTPRTM

## (undated) DEVICE — DEVICE ABLATION NOVASURE DISP

## (undated) DEVICE — GLOVE SURG BIOGEL LATEX SZ 7.5